# Patient Record
(demographics unavailable — no encounter records)

---

## 2017-10-26 NOTE — RADRPT
EXAM DATE/TIME:  10/26/2017 10:24 

 

[facility] COMPARISON:     

No previous studies available for comparison.

 

                     

INDICATIONS :     

Chest pain and shortness of breath.

                     

 

MEDICAL HISTORY :     

Chronic obstructive pulmonary disease.          

 

SURGICAL HISTORY :     

None.   

 

ENCOUNTER:     

Initial                                        

 

ACUITY:     

1 day      

 

PAIN SCORE:     

10/10

 

LOCATION:     

Bilateral chest 

 

FINDINGS:     

I don't have any priors. Large pleural effusion with patchy consolidation and volume loss seen on the
 left. There is near complete opacification of the left hemithorax. Right lung is reasonably clear. H
eart size probably upper limits of normal.

 

CONCLUSION:     

Combination of extensive consolidation, large effusion and volume loss on the left.

 

 

 

 Wayne Bell MD on October 26, 2017 at 10:55           

Board Certified Radiologist.

 This report was verified electronically.

## 2017-10-26 NOTE — EKG
Date Performed: 10/26/2017       Time Performed: 09:46:27

 

PTAGE:      56 years

 

EKG:      SINUS TACHYCARDIA WITH OCCASIONAL SUPRAVENTRICULAR PREMATURE COMPLEXES MARKED LEFT AXIS DEV
IATION NONSPECIFIC T-WAVE ABNORMALITY ABNORMAL ECG 

 

NO PREVIOUS TRACING            

 

DOCTOR:   Saul Dunbar  Interpretating Date/Time  10/26/2017 23:39:53

## 2017-10-26 NOTE — MH
cc:

KESHAV CONDE

****

 

DATE OF ADMISSION:  10/26/2017

 

 May 16, 1961

 

HISTORY OF PRESENT ILLNESS

The patient is a 56-year-old female with past medical history of COPD, who

presented to [facility] ED with complaints of coughing, wheezing.

The patient states that she had food poisoning last week where she had nausea

and diarrhea, after she ate suspicious food. She denies any worsening of

dyspnea from baseline. In addition, she denies any constitutional symptoms.

She quit smoking five and a half years ago and used to smoke one to two packs

per day for about 30 years.  Chest x-ray in the ER showed consolidation with

large effusion and volume loss on the left.  On further history she denies any

hemoptysis, however, she reports 20 pounds weight loss in the last 4 months and

decreased p.o. intake.  She is being followed up by Dr. Grey her outpatient

pulmonologist.  The patient denies any use of oxygen at home, however, she is

on bronchodilators p.r.n. Her laboratory data is significant for hyponatremia

with sodium level 125, lactic acid level 2.1.  On arrival to the ER she was

tachycardic and tachypneic.  When seen the patient is on 4 liters nasal cannula

with saturation ranges between %.  In the ER she was given Rocephin,

azithromycin, Solu-Medrol, bronchodilator treatment and IV fluids.  Her current

blood pressure is 107/61.

 

PAST MEDICAL HISTORY

Significant for COPD.

 

PAST SURGICAL HISTORY

Unremarkable.

 

ALLERGIES

NO KNOWN DRUG ALLERGIES.

 

FAMILY HISTORY

Father  with lung cancer age 54.

 

SOCIAL HISTORY

Ex-smoker, quit smoking five and half years ago.  Used to smoke one to two

packs per day for 30 years.  She is occasional drinker.

 

MEDICATIONS

Reported medications at home:

Lasix 20 mg daily.

 

REVIEW OF SYSTEMS

As per HPI.  The rest of the review of systems is unremarkable.

 

PHYSICAL EXAMINATION

GENERAL: A 56-year-old female lying in bed in mild distress, appears ill.

VITAL SIGNS:  Temperature of 98.0, pulse 112, blood pressure 107/61, saturation

% on 4 liters oxygen.

HEENT:  Atraumatic, normocephalic. Pupil equal, round and reactive to light and

accommodation. Extraocular muscles intact.  Conjunctivae pink.  Nonicteric

sclerae.  Oral mucosa within normal.  Dry mucous membranes noted.

NECK: Supple.  No JVD, adenopathy or thyromegaly.  Trachea midline.

CARDIOVASCULAR: Tachycardic, normal S1-S2.  No murmurs, rubs or gallops noted.

PULMONARY: Diminished breath sounds at the left base.

ABDOMEN: Soft, nontender, no distension.  Positive bowel sounds.

EXTREMITIES: No cyanosis, clubbing or edema.

NEURO: No focal sensory deficit.

 

LABORATORY DATA

WBC 8.3, hemoglobin 12.4, hematocrit 37, platelet count of 117, sodium 125,

potassium 3.4, chloride 90, CO2 20, BUN 18, creatinine 0.88, glucose 127,

lactic acid 2.1, AST 46, ALT 27, total bilirubin 0.8, troponin 0.11 with total

CK 38, albumin 2.4.

Urinalysis moderate leukocyte esterase, 7 WBC, many bacteria.

 

RADIOGRAPHIC STUDIES

Chest x-ray showed extensive consolidation with large effusion on left.

 

IMPRESSION

1.  Acute respiratory insufficiency.

2.  Left-sided consolidation with pleural effusion.

3.  COPD exacerbation.

4.  Hyponatremia.

5.  Hypokalemia.

6.  Urinary tract infection.

7.  Mild thrombocytopenia.

8.  Elevated AST.

 

RECOMMENDATIONS

1.   Monitor neuro status and avoid any sedatives.

2.   Continue with oxygen and maintain sats above 92%.

3.   Bronchodilators in the form of DuoNeb q. 4 plus q. 2 p.r.n. for shortness

of breath.

4.   The patient is scheduled to undergo left-sided thoracentesis by IR with

possible pigtail catheter placement.  Will send the pleural fluid for analysis,

culture and cytology. Need to rule out malignancy.

5.   Will obtain CT scan of the chest for further evaluation of the pulmonary

parenchyma.

6.   Monitor heart rate and blood pressure closely and maintain MAP above 65

mmHg.  IV fluids in the form of NS at 84 mL an hour.

Lactic acid level measured at 2.1 in the ED.

7.   Monitor renal function I&O's and electrolyte replacement per protocol.

8.   Will check serum and urine osmolality in addition to urine sodium.  Rule

out SIADH secondary to malignancy versus volume depletion.

9.   Continue with broad-spectrum antibiotics in the form of Rocephin and

Zithromax and monitor for signs of infections which include fever and WBC.

Follow up on blood and urine cultures, in addition we will check sputum

culture.  Nasal aspirate screening for influenza is negative.

10.  Place on sliding scale insulin with Accu-Cheks for glycemic control.

11.  Monitor CBC.

12.  No indication for GI prophylaxis.  DVT prophylaxis with SCDs.  Will hold

off on chemical anticoagulation prophylaxis as the patient will undergo

thoracentesis.

 

Further recommendations will be based on hospital course.

 

 

                               __________________________________

                           MD JAZMÍN Khan/JERRY

D:  10/26/2017/12:15 PM

T:  10/26/2017/12:29 PM

Visit #:  [id number]

Job #:  [id number]

## 2017-10-26 NOTE — MB
cc:

NATA CHÁVEZ M.D.

****

 

 

DATE OF CONSULTATION

10/26/17

 

REASON FOR CONSULTATION

Respiratory distress and pleural effusions

 

HISTORY OF PRESENT ILLNESS

This is a 56-year-old white female with a history of COPD and chronic

bronchitis who apparently was suffering from food poisoning  over the past one

week and became quite weak, had nausea, diarrhea, abdominal discomfort and

dehydration.  The patient states that she was unable to pass urine and became

very weak, had lost weight and thus came to the ER and subsequently was

admitted.

 

The patient did have a chest x-ray which showed an area of  effusion with

volume loss in the left side. She then had a chest CT done which showed

evidence of a loculated  mass-like infiltrate in the left lung base  12 x 12

cm. There was also a  left upper lobe lung nodule 7 mm.  She had some  mucous

___ in her left main stem bronchus and volume loss of the left lung. Also

ascending aortic aneurysm was noted.  The patient was started on Solu-Medrol,

IV antibiotics including Rocephin and Zithromax and now in the ICU and seems to

be feeling better.  Denies chest pains or abdominal pains or nausea or

vomiting.

 

PAST MEDICAL HISTORY

1.  History of COPD.

2.  She has had severe anemia for many years.

3.  Fluid retention and chronic leg edema in the past

4.  Chronic pleural effusions which required thoracentesis in the past.

5.  The patient has had no other significant surgical history.  6.  She was

mildly hypertensive in the past.

 

HABITS

The patient smoked one-pack per day for over 30 years and quit five and a half

years ago.  Occasional alcohol use.

 

MEDICATIONS

Lasix 20 mg daily.

 

ALLERGIES

No drug allergies.

 

FAMILY HISTORY

Father had a history of lung cancer.

 

REVIEW OF SYSTEMS

The patient has lost weight, up to 20 pounds.  No headaches, no blackouts.  She

has some chest tightness, wheezing, shortness of breath and orthopnea.  She has

epigastric distress and reflux.  She has urinary frequency and dysuria.  She

has leg swelling and joint pains. She has no skin lesions.  She has no

depression or anxiety.

 

PHYSICAL EXAMINATION

GENERAL:  This is an averagely built middle-aged white female who is pale and

mildly dyspneic.

VITAL SIGNS:  Blood pressure 110/60, pulse is 105, respirations 22, temperature

98.2.

HEENT:  Head normocephalic.  Pupils are reactive and equal.  Tongue moist.

Throat is mildly injected.  Nasal mucosa edematous.

NECK:  Supple.  No bruits, thyroid enlargement or lymphadenopathy.

CHEST:  Distant breath sounds at the lung bases with occasional crackles in the

left upper lung field.

HEART:  The heart sounds are irregular, S1-S2.  No murmur.  No S3.

ABDOMEN:  Soft and nontender.  No organomegaly.  Bowel sounds are active.

EXTREMITIES:  Edema 1+ with diminished peripheral pulses.  NEUROLOGIC:

Reflexes are 1+ with no gross motor deficits.  Cranial nerves grossly intact.

 

RECTAL:   Exam is deferred.

SKIN: Dry and scaly.

 

IMPRESSION

1.  COPD with acute exacerbation

2.  Chronic left basilar effusion with loculation

3.  Atelectasis left lower lobe with mucus plugging.

4.  Hyponatremia.

5.  Abnormal liver enzymes

6.  Anemia and chronic disease.

 

PLAN

The patient has been started on O2 at 2 liters nasal cannula, nebulized DuoNeb

solution q.i.d. and p.r.n. Continue with Rocephin 1 gram IV daily and Zithromax

500 mg IV daily.

 

IMAGING STUDIES

Ultrasound examination of the left lower chest to evaluate the effusion.

Pigtail catheter placement by interventional radiology to drain the effusion.

If there is a mass that is detected on ultrasound, we will need to get a

CT-guided needle biopsy of the mass as well.  The patient may also require a

bronchoscopy at a later date when she is clinically stable. We will get a

pulmonary function study when she is out of the ICU. We will continue with

Solu-Medrol 40 mg IV q.6 h.  Thank you, Dr. Herrmann, for this consultation.

 

 

 

                              _________________________________

                              MD ANA Callejas/

D:  10/26/2017/6:02 PM

T:  10/26/2017/8:06 PM

Visit #:  [id number]

Job #:  [id number]

## 2017-10-26 NOTE — RADRPT
EXAM DATE/TIME:  10/26/2017 13:07 

 

[facility] COMPARISON:     

No previous studies available for comparison.

 

 

INDICATIONS :     

Left effusion, extensive consolidation on radiograph.

                      

 

IV CONTRAST:     

69 cc Omnipaque 350 (iohexol) IV 

 

 

RADIATION DOSE:     

9.59 CTDIvol (mGy) 

 

 

MEDICAL HISTORY :     

Chronic obstructive pulmonary disease.  

 

SURGICAL HISTORY :      

None. 

 

ENCOUNTER:      

Initial

 

ACUITY:      

1 day

 

PAIN SCALE:      

0/10

 

LOCATION:       

Left chest 

 

TECHNIQUE:      

Volumetric scanning of the chest was performed.  Using automated exposure control and adjustment of t
he mA and/or kV according to patient size, radiation dose was kept as low as reasonably achievable to
 obtain optimal diagnostic quality images.   DICOM format image data is available electronically for 
review and comparison.  

 

Follow-up recommendations for detected pulmonary nodules are based at a minimum on nodule size and pa
tient risk factors according to Fleischner Society Guidelines.

 

FINDINGS:     

There is either loculated complex fluid or a soft tissue mass involving the left inferior hemithorax.
 This measures 12.6 x 12.2 x 7.4 cm and Hounsfield units are 28. Mixed areas of either calcification 
or linear enhancement seen involving this structure. It does generate mass effect compressing the lef
t lower lobe. Low density material is seen filling the left mainstem bronchus which could either rela
te superior material or mucus. There is resulting volume loss involving the left hemithorax. The left
 upper lobe remains somewhat aerated although not fully. A 7 mm nodule is seen within the left upper 
lobe. The right lung is fully expanded and clear. Heart is normal in size. Coronary artery and aortic
 atherosclerotic calcifications are seen. The ascending aorta is dilated measuring 4.4 cm in diameter
. No appreciable adenopathy. Axillary structures are unremarkable. Images of the upper abdomen are un
remarkable.

 

CONCLUSION:     

1. Either highly complex fluid or soft tissue mass involving the left lung base with mass effect. Thi
s measures 12.2 x 12.6 x 7.4 cm. An ultrasound canal differentiate.

2. 7 mm nodule on the left upper lobe.

3. Material filling the left mainstem bronchus either related to purulent material or mucus. There is
 resulting volume loss of the entire left lung.

4. 4.4 cm ascending aortic aneurysm.

 

 

 

 Efrain Gomez Jr., MD on October 26, 2017 at 13:42           

Board Certified Radiologist.

 This report was verified electronically.

## 2017-10-26 NOTE — PD
HPI


Chief Complaint:  Respiratory Symptoms


Time Seen by Provider:  10:03


Travel History


International Travel<30 days:  No


Contact w/Intl Traveler<30days:  No


Traveled to known affect area:  No





History of Present Illness


HPI


This is a 56-year-old female with a history of COPD, presents today with 

complaints of shortness of breath, wheezing, cough.  The patient also reports 

that over the last week, she had had food poisoning.  She states she ate 

suspicious food and shortly thereafter started having the nausea vomiting 

diarrhea.  Patient also complains of difficulty urinating.  She reports that she

's not able to empty her bladder.  There is no reported dysuria.  She states 

she has to push hard to empty her bladder.





PFSH


Past Medical History


COPD:  Yes


Diminished Hearing:  No


Menopausal:  Yes





Past Surgical History


Surgical History:  No Previous Surgery





Social History


Alcohol Use:  Yes (OCC)


Tobacco Use:  No


Substance Use:  No





Allergies-Medications


(Allergen,Severity, Reaction):  


Coded Allergies:  


     No Known Allergies (Verified , 10/26/17)


Reported Meds & Prescriptions





Reported Meds & Active Scripts


Active


Reported


Furosemide 20 Mg Tab 20 Mg PO DAILY








Review of Systems


Except as stated in HPI:  all other systems reviewed are Neg


General / Constitutional:  No: Fever, Chills


HENT:  No: Headaches, Neck Pain


Cardiovascular:  Positive: Tachycardia, No: Chest Pain or Discomfort, 

Palpitations


Respiratory:  Positive: Cough, Shortness of Breath, Wheezing


Gastrointestinal:  No: Nausea, Vomiting (earlier), Abdominal Pain (early)


Genitourinary:  Positive: Other (urinary retention), No: Frequency, Dysuria


Musculoskeletal:  Positive: Weakness (generalized), No: Pain


Neurologic:  Positive: Weakness (generalized), Other (urinary retention), No: 

Headache





Physical Exam


Narrative


GENERAL: Well-nourished, well-developed patient in moderate to severe 

respiratory distress.


SKIN: Focused skin assessment warm/dry.


HEAD: Normocephalic/atraumatic.


EYES: No scleral icterus. No injection or drainage. 


NECK: Supple, trachea midline. 


CARDIOVASCULAR: Tachycardic without obvious murmurs.


RESPIRATORY: Diffuse expiratory wheezes bilaterally.  Positive accessory muscle 

use.


GASTROINTESTINAL: Abdomen soft, non-tender, nondistended.  Patient has 

subjective bladder distention.


MUSCULOSKELETAL: No cyanosis, or edema. 


NEUROLOGICAL: Awake and alert. Cranial nerves II through XII intact.  Motor 

grossly within normal limits. Five out of 5 muscle strength in all muscle 

groups.  Normal speech.





Data


Data


Last Documented VS





Vital Signs








  Date Time  Temp Pulse Resp B/P (MAP) Pulse Ox O2 Delivery O2 Flow Rate FiO2


 


10/26/17 12:06  114 27 107/61 (76) 100 Nasal Cannula 4.00 


 


10/26/17 09:39 98.0       








Orders





 Orders


Complete Blood Count With Diff (10/26/17 10:03)


Comprehensive Metabolic Panel (10/26/17 10:03)


Ckmb (Isoenzyme) Profile (10/26/17 10:03)


Troponin I (10/26/17 10:03)


Urinalysis - C+S If Indicated (10/26/17 10:03)


Influenzae A/B Antigen (10/26/17 10:03)


Blood Culture (10/26/17 10:03)


Iv Access Insert/Monitor (10/26/17 10:03)


Ecg Monitoring (10/26/17 10:03)


Oximetry (10/26/17 10:03)


Oxygen Administration (10/26/17 10:03)


Chest, Single Ap (10/26/17 10:03)


Sodium Chloride 0.9% Flush (Ns Flush) (10/26/17 10:15)


Methylprednisolone So Succ Inj (Solumedr (10/26/17 10:15)


Albuterol-Ipratropium Neb (Duoneb Neb) (10/26/17 10:15)


Albuterol Neb (Albuterol Neb) (10/26/17 10:15)


Sodium Chlor 0.9% 1000 Ml Inj (Ns 1000 M (10/26/17 10:15)


Urinary Catheter Insert/Apply (10/26/17 10:36)


Ondansetron Inj (Zofran Inj) (10/26/17 10:45)


Urine Culture (10/26/17 10:05)


Lactic Acid Sepsis Protocol (10/26/17 11:12)


Ceftriaxone Inj (Rocephin Inj) (10/26/17 11:15)


Azithromycin Inj (Zithromax Inj) (10/26/17 11:15)


Admit To Inpatient (10/26/17 )


Code Status (10/26/17 11:23)


Vital Signs (Adult) WILMA.Q1H (10/26/17 11:23)


Activity Bed Rest (10/26/17 11:23)


Elevate Head Of Bed (10/26/17 11:23)


Neuro Checks .AS ORDERED (10/26/17 11:23)


Pantoprazole Inj (Protonix Inj) (10/26/17 12:00)


Albuterol-Ipratropium Neb (Duoneb Neb) (10/26/17 12:00)


Albuterol-Ipratropium Neb (Duoneb Neb) (10/26/17 11:30)


Complete Blood Count With Diff (10/27/17 04:00)


Comprehensive Metabolic Panel (10/27/17 04:00)


Magnesium (Mg) (10/27/17 04:00)


Phosphorus (Po4) (10/27/17 04:00)


Cardiac Monitor / Telemetry WILMA.Q8H (10/26/17 11:23)


Scd Bilateral/Knee High WILMA.BID (10/26/17 11:23)


^ Initiate Protocol (10/26/17 11:23)


Instruction (10/26/17 11:23)


Misc Nursing Information (10/26/17 11:30)


Chlorhexidine 2% Cloth (Chlorhexidine 2% (10/27/17 04:00)


Chlorhexidine 2% Cloth (Chlorhexidine 2% (10/26/17 11:30)


Mrsa Pcr Surveillance (10/26/17 11:23)


Docusate Sodium-Senna (Jess-Colace) (10/26/17 21:00)


Magnesium Hydroxide Liq (Milk Of Magnesi (10/26/17 11:30)


Sennosides (Senokot) (10/26/17 11:30)


Bisacodyl Supp (Dulcolax Supp) (10/26/17 11:30)


Lactulose Liq (Lactulose Liq) (10/26/17 11:30)


Inpatient Certification (10/26/17 )


Sodium Chlor 0.9% 1000 Ml Inj (Ns 1000 M (10/26/17 11:30)


Osmolality, Urine (10/26/17 11:26)


Osmolality,Serum (10/26/17 11:26)


Sodium, Random Urine (10/26/17 11:26)


^ Medication Admin Instruction (10/26/17 11:26)


Notify Dr: Other (10/26/17 11:26)


Phosphorus (Po4) (10/26/17 11:26)


Potassium Chlor 40 Meq Premix (Kcl 40 Me (10/26/17 11:30)


Potassium Chlor 20 Meq Premix (Kcl 20 Me (10/26/17 11:30)


Potassium Chloride Eff (K-Lyte Cl  Eff) (10/26/17 11:30)


Potassium Chlor 40 Meq Premix (Kcl 40 Me (10/26/17 11:30)


Potassium Chlor 20 Meq Premix (Kcl 20 Me (10/26/17 11:30)


Magnesium Sulfate Inj (Magnesium Sulfate (10/26/17 11:30)


Magnesium Oxide (Mag-Ox) (10/26/17 11:30)


Magnesium Sulfate Inj (Magnesium Sulfate (10/26/17 11:30)


Potassium Phosphate (K-Phos) (10/26/17 11:30)


Sodium Phosphate Inj (Sodium Phosphate I (10/26/17 11:30)


Potassium Phosphate (K-Phos) (10/26/17 11:30)


Potassium Phosphate Inj (Potassium Phosp (10/26/17 11:30)


Blood Glucose Goal (Criteria) (10/26/17 11:26)


Hypoglycemia 70 Mg/Dl Or < (10/26/17 11:26)


Notify Dr: Other (10/26/17 11:26)


Dextrose 50% In Melissa (Vial) Inj (D50w (Vi (10/26/17 11:30)


Glucagon Inj (Glucagon Inj) (10/26/17 11:30)


Insulin Human Reg Supp Scale (Novolin R (10/26/17 11:30)


Sputum Culture And Gram Stain (10/26/17 11:26)


B-Type Natriuretic Peptide (10/26/17 11:28)


Methylprednisolone So Succ Inj (Solumedr (10/26/17 16:00)


Prothrombin Time / Inr (Pt) (10/26/17 11:29)


Act Partial Throm Time (Ptt) (10/26/17 11:29)


Electrocardiogram (10/26/17 09:46)


Ceftriaxone Inj (Rocephin Inj) (10/27/17 12:00)


Azithromycin Inj (Zithromax Inj) (10/27/17 13:00)


Us Guided Thoracentesis (10/26/17 )


Glucose, Pleural Fluid (10/26/17 12:11)


Ldh, Pleural Fluid (10/26/17 12:11)


Pleural Fluid Ph (10/26/17 12:11)


Pleural Fl Cell Count + Diff (10/26/17 12:11)


Fluid Culture And Gram Stain (10/26/17 12:11)


Cytology Request For Service (10/26/17 12:11)


Total Protein, Pleural Fluid (10/26/17 12:11)


Consult Pulmonology (10/26/17 )


Admit Order (Ed Use Only) (10/26/17 12:23)


Ct Thoracentesis W Insertion (10/26/17 12:24)





Labs





Laboratory Tests








Test


  10/26/17


10:05 10/26/17


10:10 10/26/17


11:35 10/26/17


12:00


 


Urine Color YELLOW    


 


Urine Turbidity HAZY    


 


Urine pH 7.5    


 


Urine Specific Gravity 1.008    


 


Urine Protein TRACE mg/dL    


 


Urine Glucose (UA) NEG mg/dL    


 


Urine Ketones 10 mg/dL    


 


Urine Occult Blood NEG    


 


Urine Nitrite NEG    


 


Urine Bilirubin NEG    


 


Urine Urobilinogen


  LESS THAN 2.0


MG/DL 


  


  


 


 


Urine Leukocyte Esterase MOD    


 


Urine RBC 1 /hpf    


 


Urine WBC 7 /hpf    


 


Urine Bacteria MANY /hpf    


 


Microscopic Urinalysis Comment


  CULTURE


INDICATED 


  


  


 


 


White Blood Count  8.3 TH/MM3   


 


Red Blood Count  3.29 MIL/MM3   


 


Hemoglobin  12.4 GM/DL   


 


Hematocrit  37.1 %   


 


Mean Corpuscular Volume  112.7 FL   


 


Mean Corpuscular Hemoglobin  37.7 PG   


 


Mean Corpuscular Hemoglobin


Concent 


  33.5 % 


  


  


 


 


Red Cell Distribution Width  14.9 %   


 


Platelet Count  117 TH/MM3   


 


Mean Platelet Volume  10.0 FL   


 


Neutrophils (%) (Auto)  75.3 %   


 


Lymphocytes (%) (Auto)  10.6 %   


 


Monocytes (%) (Auto)  14.0 %   


 


Eosinophils (%) (Auto)  0.0 %   


 


Basophils (%) (Auto)  0.1 %   


 


Neutrophils # (Auto)  6.3 TH/MM3   


 


Lymphocytes # (Auto)  0.9 TH/MM3   


 


Monocytes # (Auto)  1.2 TH/MM3   


 


Eosinophils # (Auto)  0.0 TH/MM3   


 


Basophils # (Auto)  0.0 TH/MM3   


 


CBC Comment  DIFF FINAL   


 


Differential Comment     


 


Blood Urea Nitrogen  18 MG/DL   


 


Creatinine  0.88 MG/DL   


 


Random Glucose  127 MG/DL   


 


Total Protein  7.7 GM/DL   


 


Albumin  2.4 GM/DL   


 


Calcium Level  8.4 MG/DL   


 


Alkaline Phosphatase  95 U/L   


 


Aspartate Amino Transf


(AST/SGOT) 


  46 U/L 


  


  


 


 


Alanine Aminotransferase


(ALT/SGPT) 


  27 U/L 


  


  


 


 


Total Bilirubin  0.8 MG/DL   


 


Sodium Level  125 MEQ/L   


 


Potassium Level  3.4 MEQ/L   


 


Chloride Level  90 MEQ/L   


 


Carbon Dioxide Level  20.3 MEQ/L   


 


Anion Gap  15 MEQ/L   


 


Estimat Glomerular Filtration


Rate 


  66 ML/MIN 


  


  


 


 


Total Creatine Kinase  38 U/L   


 


Troponin I  0.11 NG/ML   


 


Lactic Acid Level   2.1 mmol/L  


 


Prothrombin Time    11.8 SEC 


 


Prothromb Time International


Ratio 


  


  


  1.1 RATIO 


 


 


Activated Partial


Thromboplast Time 


  


  


  34.4 SEC 


 











MDM


Medical Decision Making


Medical Screen Exam Complete:  Yes


Emergency Medical Condition:  Yes


Interpretation(s)





Laboratory Tests








Test


  10/26/17


10:05 10/26/17


10:10 10/26/17


11:35 10/26/17


12:00


 


Urine Color YELLOW    


 


Urine Turbidity HAZY    


 


Urine pH 7.5    


 


Urine Specific Gravity 1.008    


 


Urine Protein TRACE mg/dL    


 


Urine Glucose (UA) NEG mg/dL    


 


Urine Ketones 10 mg/dL    


 


Urine Occult Blood NEG    


 


Urine Nitrite NEG    


 


Urine Bilirubin NEG    


 


Urine Urobilinogen


  LESS THAN 2.0


MG/DL 


  


  


 


 


Urine Leukocyte Esterase MOD    


 


Urine RBC 1 /hpf    


 


Urine WBC 7 /hpf    


 


Urine Bacteria MANY /hpf    


 


Microscopic Urinalysis Comment


  CULTURE


INDICATED 


  


  


 


 


White Blood Count  8.3 TH/MM3   


 


Red Blood Count  3.29 MIL/MM3   


 


Hemoglobin  12.4 GM/DL   


 


Hematocrit  37.1 %   


 


Mean Corpuscular Volume  112.7 FL   


 


Mean Corpuscular Hemoglobin  37.7 PG   


 


Mean Corpuscular Hemoglobin


Concent 


  33.5 % 


  


  


 


 


Red Cell Distribution Width  14.9 %   


 


Platelet Count  117 TH/MM3   


 


Mean Platelet Volume  10.0 FL   


 


Neutrophils (%) (Auto)  75.3 %   


 


Lymphocytes (%) (Auto)  10.6 %   


 


Monocytes (%) (Auto)  14.0 %   


 


Eosinophils (%) (Auto)  0.0 %   


 


Basophils (%) (Auto)  0.1 %   


 


Neutrophils # (Auto)  6.3 TH/MM3   


 


Lymphocytes # (Auto)  0.9 TH/MM3   


 


Monocytes # (Auto)  1.2 TH/MM3   


 


Eosinophils # (Auto)  0.0 TH/MM3   


 


Basophils # (Auto)  0.0 TH/MM3   


 


CBC Comment  DIFF FINAL   


 


Differential Comment     


 


Blood Urea Nitrogen  18 MG/DL   


 


Creatinine  0.88 MG/DL   


 


Random Glucose  127 MG/DL   


 


Total Protein  7.7 GM/DL   


 


Albumin  2.4 GM/DL   


 


Calcium Level  8.4 MG/DL   


 


Alkaline Phosphatase  95 U/L   


 


Aspartate Amino Transf


(AST/SGOT) 


  46 U/L 


  


  


 


 


Alanine Aminotransferase


(ALT/SGPT) 


  27 U/L 


  


  


 


 


Total Bilirubin  0.8 MG/DL   


 


Sodium Level  125 MEQ/L   


 


Potassium Level  3.4 MEQ/L   


 


Chloride Level  90 MEQ/L   


 


Carbon Dioxide Level  20.3 MEQ/L   


 


Anion Gap  15 MEQ/L   


 


Estimat Glomerular Filtration


Rate 


  66 ML/MIN 


  


  


 


 


Total Creatine Kinase  38 U/L   


 


Troponin I  0.11 NG/ML   


 


Lactic Acid Level   2.1 mmol/L  


 


Prothrombin Time    11.8 SEC 


 


Prothromb Time International


Ratio 


  


  


  1.1 RATIO 


 


 


Activated Partial


Thromboplast Time 


  


  


  34.4 SEC 


 








Differential Diagnosis


COPD versus pneumonia versus dehydration versus anemia


Narrative Course


56-year-old female history of COPD, presents today with points of shortness of 

breath and weakness per patient also has urinary retention.  Patient's 

urinalysis showed evidence of a UTI.  Patient also had 1300 cc of urine in her 

bladder that she could not void.  Chest x-ray shows a large left sided pleural 

effusion.  Patient also has hyponatremia and an elevated troponin.  Case was 

discussed with Dr. Herrmann, intensivist, who agreed to admit the patient his 

service.  The patient will get a therapeutic and diagnostic thoracentesis with 

chest tube placement.  He'll be a CT ordered of her chest after the 

thoracentesis.  Concern here is patient may have paraneoplastic syndrome and 

underlying lung cancer.


Critical Care Narrative


Aggregate critical care time was 45 minutes. Time to perform other separately 

billable procedures was not included in the critical care time. My time did not 

include minutes spent treating any other patients simultaneously or on 

activities that did not directly contribute to the patient's treatment.  





The services I provided to this patient were to treat and/or prevent clinically 

significant deterioration that could result in: Death





I provided critical care services requiring my management, as noted below:


Chart data review, documentation time, medication orders and management, vital 

sign assessments/reviewing monitor data, ordering and reviewing lab tests, 

ordering and interpreting/reviewing x-rays and diagnostic studies, care of the 

patient and discussion of the patient with the admitting physicians.





Diagnosis





 Primary Impression:  


 Respiratory distress


 Additional Impressions:  


 large left pleural effusion


 Hyponatremia


 Elevated troponin


 persistent tachycardia


 Hypochloremia





Admitting Information


Admitting Physician Requests:  Admit











Vikash Burciaga MD Oct 26, 2017 10:54

## 2017-10-27 NOTE — HHI.CCPN
Subjective


Remarks/Hospital Course


The patient is a 56-year-old female with past medical history of COPD, who 

presented to [facility] ED with complaints of coughing, wheezing. 

The patient states that she had food poisoning last week where she had nausea 

and diarrhea, after she ate suspicious food. She denies any worsening of 

dyspnea from baseline. In addition, she denies any constitutional symptoms. She 

quit smoking five and a half years ago and used to smoke one to two packs per 

day for about 30 years.  Chest x-ray in the ER showed consolidation with large 

effusion and volume loss on the left.  On further history she denies any 

hemoptysis, however, she reports 20 pounds weight loss in the last 4 months and 

decreased p.o. intake.  She is being followed up by Dr. Grey her outpatient 

pulmonologist.  The patient denies any use of oxygen at home, however, she is 

on bronchodilators p.r.n. Her laboratory data is significant for hyponatremia 

with sodium level 125, lactic acid level 2.1.  On arrival to the ER she was 

tachycardic and tachypneic.  When seen the patient is on 4 liters nasal cannula 

with saturation ranges between %.  In the ER she was given Rocephin, 

azithromycin, Solu-Medrol, bronchodilator treatment and IV fluids.  Her current 

blood pressure is 107/61.





10/27 Patient s/p CT guided thoracentesis and CT placement by IR yesterday. She 

is feeling better.





Objective





Vital Signs








  Date Time  Temp Pulse Resp B/P (MAP) Pulse Ox O2 Delivery O2 Flow Rate FiO2


 


10/27/17 07:29     100   21


 


10/27/17 06:00  99      


 


10/27/17 04:00 98.7  24 128/78 (95)    


 


10/26/17 19:46      Nasal Cannula 2.00 














Intake and Output   


 


 10/27/17 10/27/17 10/28/17





 08:00 16:00 00:00


 


Output Total 350 ml  


 


Balance -350 ml  








Result Diagram:  


10/27/17 0330                                                                  

              10/27/17 0330





Other Results





Laboratory Tests








Test


  10/26/17


10:05 10/26/17


10:10 10/26/17


11:35 10/26/17


12:00


 


Urine Color YELLOW    


 


Urine Turbidity HAZY    


 


Urine pH 7.5    


 


Urine Specific Gravity 1.008    


 


Urine Protein TRACE mg/dL    


 


Urine Glucose (UA) NEG mg/dL    


 


Urine Ketones 10 mg/dL    


 


Urine Occult Blood NEG    


 


Urine Nitrite NEG    


 


Urine Bilirubin NEG    


 


Urine Urobilinogen


  LESS THAN 2.0


MG/DL 


  


  


 


 


Urine Leukocyte Esterase MOD    


 


Urine RBC 1 /hpf    


 


Urine WBC 7 /hpf    


 


Urine Bacteria MANY /hpf    


 


Microscopic Urinalysis Comment


  CULTURE


INDICATED 


  


  


 


 


Urine Osmolality 289 MOSM/KG    


 


Urine Random Sodium 48 MEQ/L    


 


White Blood Count  8.3 TH/MM3   


 


Red Blood Count  3.29 MIL/MM3   


 


Hemoglobin  12.4 GM/DL   


 


Hematocrit  37.1 %   


 


Mean Corpuscular Volume  112.7 FL   


 


Mean Corpuscular Hemoglobin  37.7 PG   


 


Mean Corpuscular Hemoglobin


Concent 


  33.5 % 


  


  


 


 


Red Cell Distribution Width  14.9 %   


 


Platelet Count  117 TH/MM3   


 


Mean Platelet Volume  10.0 FL   


 


Neutrophils (%) (Auto)  75.3 %   


 


Lymphocytes (%) (Auto)  10.6 %   


 


Monocytes (%) (Auto)  14.0 %   


 


Eosinophils (%) (Auto)  0.0 %   


 


Basophils (%) (Auto)  0.1 %   


 


Neutrophils # (Auto)  6.3 TH/MM3   


 


Lymphocytes # (Auto)  0.9 TH/MM3   


 


Monocytes # (Auto)  1.2 TH/MM3   


 


Eosinophils # (Auto)  0.0 TH/MM3   


 


Basophils # (Auto)  0.0 TH/MM3   


 


CBC Comment  DIFF FINAL   


 


Differential Comment     


 


Blood Urea Nitrogen  18 MG/DL   


 


Creatinine  0.88 MG/DL   


 


Random Glucose  127 MG/DL   


 


Total Protein  7.7 GM/DL   


 


Albumin  2.4 GM/DL   


 


Calcium Level  8.4 MG/DL   


 


Alkaline Phosphatase  95 U/L   


 


Aspartate Amino Transf


(AST/SGOT) 


  46 U/L 


  


  


 


 


Alanine Aminotransferase


(ALT/SGPT) 


  27 U/L 


  


  


 


 


Total Bilirubin  0.8 MG/DL   


 


Sodium Level  125 MEQ/L   


 


Potassium Level  3.4 MEQ/L   


 


Chloride Level  90 MEQ/L   


 


Carbon Dioxide Level  20.3 MEQ/L   


 


Anion Gap  15 MEQ/L   


 


Estimat Glomerular Filtration


Rate 


  66 ML/MIN 


  


  


 


 


Serum Osmolality  276 MOSM/KG   


 


Phosphorus Level  2.5 MG/DL   


 


Total Creatine Kinase  38 U/L   


 


Troponin I  0.11 NG/ML   


 


B-Type Natriuretic Peptide  634 PG/ML   


 


Lactic Acid Level   2.1 mmol/L  


 


Prothrombin Time    11.8 SEC 


 


Prothromb Time International


Ratio 


  


  


  1.1 RATIO 


 


 


Activated Partial


Thromboplast Time 


  


  


  34.4 SEC 


 


 


Test


  10/26/17


14:00 10/26/17


15:05 10/26/17


16:00 10/27/17


03:30


 


Pleural Fluid pH     


 


Pleural Fluid WBC  /MM3    


 


Pleural Fluid RBC  /MM3    


 


Pleural Fluid Neutrophils  %    


 


Pleural Fluid Lymphocytes  %    


 


Pleural Fluid Monocytes  %    


 


Lactic Acid Level  1.6 mmol/L   


 


Nasal Screen MRSA (PCR)


  


  


  MRSA NOT


DETECTED 


 


 


White Blood Count    4.2 TH/MM3 


 


Red Blood Count    2.24 MIL/MM3 


 


Hemoglobin    8.4 GM/DL 


 


Hematocrit    24.9 % 


 


Mean Corpuscular Volume    111.1 FL 


 


Mean Corpuscular Hemoglobin    37.5 PG 


 


Mean Corpuscular Hemoglobin


Concent 


  


  


  33.8 % 


 


 


Red Cell Distribution Width    14.7 % 


 


Platelet Count    89 TH/MM3 


 


Mean Platelet Volume    10.5 FL 


 


Neutrophils (%) (Auto)    86.3 % 


 


Lymphocytes (%) (Auto)    8.8 % 


 


Monocytes (%) (Auto)    4.8 % 


 


Eosinophils (%) (Auto)    0.1 % 


 


Basophils (%) (Auto)    0.0 % 


 


Neutrophils # (Auto)    3.6 TH/MM3 


 


Lymphocytes # (Auto)    0.4 TH/MM3 


 


Monocytes # (Auto)    0.2 TH/MM3 


 


Eosinophils # (Auto)    0.0 TH/MM3 


 


Basophils # (Auto)    0.0 TH/MM3 


 


CBC Comment    AUTO DIFF 


 


Differential Comment


  


  


  


  AUTO DIFF


CONFIRMED


 


Platelet Estimate    LOW 


 


Platelet Morphology Comment    ENLARGED 


 


Blood Urea Nitrogen    14 MG/DL 


 


Creatinine    0.38 MG/DL 


 


Random Glucose    130 MG/DL 


 


Total Protein    5.6 GM/DL 


 


Albumin    1.7 GM/DL 


 


Calcium Level    7.8 MG/DL 


 


Phosphorus Level    1.8 MG/DL 


 


Magnesium Level    2.0 MG/DL 


 


Alkaline Phosphatase    55 U/L 


 


Aspartate Amino Transf


(AST/SGOT) 


  


  


  27 U/L 


 


 


Alanine Aminotransferase


(ALT/SGPT) 


  


  


  16 U/L 


 


 


Total Bilirubin    0.3 MG/DL 


 


Sodium Level    134 MEQ/L 


 


Potassium Level    3.3 MEQ/L 


 


Chloride Level    102 MEQ/L 


 


Carbon Dioxide Level    22.7 MEQ/L 


 


Anion Gap    9 MEQ/L 


 


Estimat Glomerular Filtration


Rate 


  


  


  175 ML/MIN 


 








Imaging





Last Impressions








Chest Tube Insertion 10/26/17 1224 Signed





Impressions: 





 Service Date/Time:  Thursday, October 26, 2017 13:22 - CONCLUSION: 

Uncomplicated 





 CT-guided thoracentesis.     Jorge Carrillo MD 


 


Chest X-Ray 10/26/17 1003 Signed





Impressions: 





 Service Date/Time:  Thursday, October 26, 2017 10:24 - CONCLUSION:  

Combination 





 of extensive consolidation, large effusion and volume loss on the left.     

Wayne Bell MD 


 


Chest CT 10/26/17 0000 Signed





Impressions: 





 Service Date/Time:  Thursday, October 26, 2017 13:07 - CONCLUSION:  1. Either 





 highly complex fluid or soft tissue mass involving the left lung base with 

mass 





 effect. This measures 12.2 x 12.6 x 7.4 cm. An ultrasound canal differentiate. 





 2. 7 mm nodule on the left upper lobe. 3. Material filling the left mainstem 





 bronchus either related to purulent material or mucus. There is resulting 

volume 





 loss of the entire left lung. 4. 4.4 cm ascending aortic aneurysm.     Efrain Gomez Jr., MD 








Objective Remarks


GENERAL: Patient is 55 yo lying in bed in NAD


SKIN: Warm and dry.


HEAD: Normocephalic.


EYES: No scleral icterus. No injection or drainage. 


NECK: Supple, trachea midline. No JVD or lymphadenopathy.


CARDIOVASCULAR: Regular rate and rhythm without murmurs, gallops, or rubs. 


RESPIRATORY: Breath sounds equal bilaterally. No accessory muscle use.


GASTROINTESTINAL: Abdomen soft, non-tender, nondistended. 


MUSCULOSKELETAL: No cyanosis, or edema. 


Neuro: Awake and alert








A/P


Assessment and Plan


1.  Acute respiratory insufficiency.


2.  Left-sided consolidation with pleural effusion.


3.  COPD exacerbation.


4.  Hyponatremia/hypophos


5.  Hypokalemia.


6.  Urinary tract infection.


7.  Mild thrombocytopenia.


8.  Elevated AST.





Plan


Neuro: Awake and alert





Pulm: Oxygen PRN maintain sats above 92%.


         Bronchodilators, solumederol 60mg Q6. Check CXR


        s/p CT guided left-sided thoracentesis and CT placement by IR 

yesterday.  


        Send the pleural fluid for analysis, culture and cytology. N


        CT chest: Either highly complex fluid or soft tissue mass involving the 

left lung base with mass effect. This measures 12.2 x 12.6 x 7.4 cm. 


        4.4 cm ascending aortic aneurysm.


        Will check US chest to differentiate between solid vs cystic mass in 

chest if solid then patient will need CT guided needle biopsy to r/o malignancy.


        Pulm is following-Dr. Grey. 





CV:  Monitor HR and BP and maintain MAP>65mmHg


       Lactic acid level measured at 2.1 in the ED.


      Check Echo to eval LV function





GI: On PO diet





: Monitor renal function I&O's and electrolyte replacement per protocol.


      Will need K, Phos replacement today





ID: Continue with abx( Rocephin and Zithromax) and monitor for signs of 

infections(fever and WBC).


   Follow up on blood and urine cultures,


   Nasal aspirate screening for influenza is negative.





Endo: SSI with Accu-Cheks for glycemic control.





Heme: Monitor CBC.





DVT prophylaxis with SCDs.





Level 3











Richa Herrmann MD Oct 27, 2017 08:23

## 2017-10-27 NOTE — ECHRPT
Indication:   EVAL LV FUNCTION

 

 CONCLUSIONS

 Normal left ventricular size. 

 Wall thickness is normal. 

 The left ventricular systolic function is grossly normal on limited imaging. 

 The left atrial size is moderately dilated. 

 Mild mitral valve stenosis. 

 Mild mitral annular calcification. 

 Mild mitral valve regurgitation. 

 Moderate calcifix aortic valve stenosis. 

 Mild-to-moderate aortic valve regurgitation. 

 There is trace tricuspid valve regurgitation. 

 The estimated pulmonary arterial pressure is 69.3 mmHg. 

 There is estimated moderate-to-severe pulmonary hypertension present (range 60-70 mmHg). 

 

 BP:  128   / 78      HR:                          Rhythm:           Sinus

 

 MEASUREMENTS  (Male / Female) Normal Values       Technical Quality:Fair

 2D ECHO

 LV Diastolic Diameter PLAX        3.7 cm                4.2 - 5.9 / 3.9 - 5.3 cm

 LV Systolic Diameter PLAX         2.4 cm                

 IVS Diastolic Thickness           1.0 cm                0.6 - 1.0 / 0.6 - 0.9 cm

 LVPW Diastolic Thickness          1.0 cm                0.6 - 1.0 / 0.6 - 0.9 cm

 LV Relative Wall Thickness        0.5                   

 LVOT Diameter                     1.9 cm                

 Aortic Root Diameter              3.0 cm                

 LA Systolic Diameter LX           3.8 cm                3.0 - 4.0 / 2.7 - 3.8 cm

 

 DOPPLER

 AV Peak Velocity                  459.0 cm/s            

 AV Peak Gradient                  84.3 mmHg             

 AV Mean Gradient                  49.0 mmHg             

 AV Velocity Time Integral         90.5 cm               

 LVOT Peak Velocity                116.7 cm/s            

 LVOT Peak Gradient                5.4 mmHg              

 LVOT Velocity Time Integral       23.0 cm               

 AV Area Cont Eq vti               0.7 cm               

 AV Area Cont Eq pk                0.7 cm               

 MV Peak Velocity                  237.6 cm/s            

 MV Peak Gradient                  22.6 mmHg             

 MV Mean Velocity                  131.2 cm/s            

 MV Mean Gradient                  8.4 mmHg              

 Mitral E Point Velocity           78.2 cm/s             

 Mitral A Point Velocity           96.7 cm/s             

 Mitral E to A Ratio               0.8                   

 TR Peak Velocity                  385.0 cm/s            

 TR Peak Gradient                  59.3 mmHg             

 Right Atrial Pressure             10.0 mmHg             

 Pulmonary Artery Systolic Pressu  69.3 mmHg             

 Right Ventricular Systolic Press  69.3 mmHg             

 PV Peak Velocity                  79.9 cm/s             

 PV Peak Gradient                  2.6 mmHg              

 

 

 FINDINGS

 

 LEFT VENTRICLE

 Normal left ventricular size. 

 Wall thickness is normal. 

 The left ventricular systolic function is grossly normal on limited imaging. 

 

 RIGHT VENTRICLE

 Normal right ventricular size and systolic function. 

 

 LEFT ATRIUM

 The left atrial size is moderately dilated. 

 

 

 RIGHT ATRIUM

 The right atrial size is normal. 

 

 AORTA

 The aortic root and proximal ascending aorta are normal in size on limited imaging. 

 

 MITRAL VALVE

 Mild mitral valve stenosis. 

 Mild mitral annular calcification. 

 Mild mitral valve regurgitation. 

 

 AORTIC VALVE

 Moderate calcifix aortic valve stenosis. 

 Mild-to-moderate aortic valve regurgitation. 

 

 TRICUSPID VALVE

 There is trace tricuspid valve regurgitation. 

 The estimated pulmonary arterial pressure is 69.3 mmHg. 

 There is estimated moderate-to-severe pulmonary hypertension present (range 60-70 mmHg). 

 

 

 

 

 

  Rashid Davis MD

  (Electronically Signed)

  Final Date:27 October 2017 18:30

## 2017-10-27 NOTE — RADRPT
EXAM DATE/TIME:  10/27/2017 09:59 

 

[facility] COMPARISON:     

CT THORAX W CONTRAST, October 26, 2017, 13:07.

        

 

 

INDICATIONS :     

Possible mass vs. complex fluid seen on CT.

                     

 

MEDICAL HISTORY :     

Chronic obstructive pulmonary disease.     Blood transfusion.

 

SURGICAL HISTORY :          

Left chest tube.

 

ENCOUNTER:     

Initial

 

ACUITY:     

1 day

 

PAIN SCORE:     

7/10

 

LOCATION:     

Left chest 

                              

FINDINGS:     

Real-time ultrasound examination of the left chest was performed.  A tube is seen in the left chest a
nd there is intermediate echotexture material surrounding the tube measuring up to 7 cm in thickness.
  There is no anechoic fluid seen.

 

CONCLUSION:     

No focal collections of anechoic free fluid.  Prominent amount of heterogeneous echotexture material 
surrounding the left lower chest.

 

 

 

 Efrain Samuels MD on October 27, 2017 at 11:14           

Board Certified Radiologist.

 This report was verified electronically.

## 2017-10-27 NOTE — HHI.PR
Subjective


Remarks


Pain  along  left  chest wall. No SOB .


Chest tube  drained little





Objective





Vital Signs








  Date Time  Temp Pulse Resp B/P (MAP) Pulse Ox O2 Delivery O2 Flow Rate FiO2


 


10/27/17 08:00 98.5 103 30 116/63 (80) 100   


 


10/27/17 07:29     100   21


 


10/27/17 06:00  99      


 


10/27/17 04:00 98.7 96 24 128/78 (95) 100   


 


10/27/17 04:00  96      


 


10/27/17 02:00  124      


 


10/27/17 00:00  99      


 


10/27/17 00:00 99.0 99 24 102/59 (73) 99   


 


10/26/17 22:00  101      


 


10/26/17 20:00  112      


 


10/26/17 20:00 99.4 112 29 106/59 (75) 100   


 


10/26/17 19:46     100 Nasal Cannula 2.00 


 


10/26/17 18:00  98      


 


10/26/17 16:15 97.1 112 30 105/64 (78) 100   


 


10/26/17 16:05     (72)  Nasal Cannula 4.00 


 


10/26/17 16:00  95      


 


10/26/17 14:35  115 24 101/57 (72) 100 Nasal Cannula 4.00 














I/O      


 


 10/26/17 10/26/17 10/26/17 10/27/17 10/27/17 10/27/17





 07:00 15:00 23:00 07:00 15:00 23:00


 


Intake Total  650 ml 357 ml   


 


Output Total   300 ml 350 ml  


 


Balance  650 ml 57 ml -350 ml  


 


      


 


Intake IV Total  650 ml 357 ml   


 


Output Urine Total   300 ml 350 ml  


 


# Bowel Movements    0  








Result Diagram:  


10/27/17 1145                                                                  

              10/27/17 0330





Objective Remarks


GENERAL:  This is an averagely built middle-aged white female who is pale and


mildly dyspneic.


HEENT:  Head normocephalic.  Pupils are reactive and equal.  Tongue moist.


Throat is mildly injected.  Nasal mucosa edematous.


NECK:  Supple.  No bruits, thyroid enlargement or lymphadenopathy.


CHEST:  Distant breath sounds at the lung bases with occasional crackles in the


left upper lung field.Wheeze +


HEART:  The heart sounds are irregular, S1-S2.  No murmur.  No S3.


ABDOMEN:  Soft and nontender.  No organomegaly.  Bowel sounds are active.


EXTREMITIES:  Edema 1+ with diminished peripheral pulses.  NEUROLOGIC:


Reflexes are 1+ with no gross motor deficits.  Cranial nerves grossly intact.


RECTAL:   Exam is deferred.


SKIN: Dry and scaly.





Assessment and Plan


Assessment and Plan





 


IMPRESSION


1.  COPD with acute exacerbation


2.  Chronic left basilar effusion with loculation


3.  Atelectasis left lower lobe with mucus plugging.


4.  Hyponatremia.


5.  Abnormal liver enzymes


6.  Anemia and chronic disease.











Plan :








1. O2  2 L.





2. Chest tube  to  suction.





3. Cont  Antibiotics , Rocephin ,Zithro.





4. CBC,CXR in am





5. Taper Solumedrol  to  40 mg q8h





6. May need  CT Guided  needle  biopsy











NATA Grey MD Oct 27, 2017 13:02

## 2017-10-27 NOTE — RADRPT
EXAM DATE/TIME:  10/26/2017 13:22 

 

INDICATIONS :      

Is suspected malignancy with paraneoplastic syndrome. Patient is tachypneic and tachycardic with comp
tyrel loculated left-sided pleural effusion. Image guided chest tube placement been requested.

 

 

SEDATION TIME:       

30 

                     

 

MEDICATION(S):

1.).5 mg midazolam (Versed) IV  

2.) 25 mcg fentanyl (Sublimaze) IV  

 

 

DEVICE(S):

1.) 12 Fr Giovana 

                     

 

FLUID: 

Total volume of 12 cc of red fluid was removed.

Fluid was sent for laboratory ordered studies.

                     

 

MEDICAL HISTORY :     

Chronic obstructive pulmonary disease.   

 

SURGICAL HISTORY :     

None.   

 

ENCOUNTER:     

Initial

 

ACUITY:     

1 day

 

PAIN SCORE:     

0/10

 

LOCATION:     

Left chest

                     

 

PROCEDURE:

1.    CT guided Left thoracentesis with chest tube placement.

2.    Conscious sedation with continuous EKG and oximetry monitoring.

3.    EKG and oximetry remained stable throughout the procedure.

 

 

 

The site was prepped in sterile fashion.  Full sterile technique was used, including cap, mask, steri
le gloves and gown and a large sterile sheet.  Hand hygiene and 2% chlorhexidine and/or betadine/alco
hol prep was utilized per protocol for cutaneous antisepsis.  The skin and subcutaneous tissues were 
infiltrated with local anesthetic solution.  Using automated exposure control and adjustment of the m
A and/or kV according to patient size, radiation dose was kept as low as reasonably achievable to obt
ain optimal diagnostic quality images.  DICOM format image data is available electronically for revie
w and comparison.  

 

Under CT guidance a 12 Persian nonlocking pigtail catheter was placed in the left pleural space. Appro
ximately 12 cc of serosanguineous fluid were gently aspirated out of the chest.  Chest tube was inser
valerie. Post procedural scan show reduction in the amount of fluid with no evidence of pneumothorax.

 

The patient tolerated the procedure well and there were no complications. EKG and oximetry remained s
table throughout the procedure. The patient was sent to recovery in stable condition.

 

CONCLUSION:     Uncomplicated CT-guided thoracentesis.

 

 

 

 Jorge Carrillo MD on October 27, 2017 at 7:19           

Board Certified Radiologist.

 This report was verified electronically.

## 2017-10-27 NOTE — RADRPT
EXAM DATE/TIME:  10/27/2017 08:56 

 

[facility] COMPARISON:     

CT THORAX W CONTRAST, October 26, 2017, 13:07.  CHEST SINGLE AP, October 26, 2017, 10:24.

 

                     

INDICATIONS :     

Shortness of breath, concern for effusion.

                     

 

MEDICAL HISTORY :     

Chronic obstructive pulmonary disease.          

 

SURGICAL HISTORY :     

None.   

 

ENCOUNTER:     

Initial                                        

 

ACUITY:     

1 day      

 

PAIN SCORE:     

5/10

 

LOCATION:     

Left chest 

 

FINDINGS:     

Interval placement of left-sided chest tube. Large loculated left pleural effusion is largely unchang
ed. Continued associated airspace consolidation and left hilar prominence. Mild interstitial prominen
ce throughout the right lung likely due to decreased lung volumes. Cardiomediastinal contours are sta
ble. Remainder of exam is unchanged.

 

 

CONCLUSION:     

1. Interval placement of left-sided chest tube without significant interval improvement in complex lo
culated left-sided pleural effusion.

2. Redemonstration of left hilar mass and associated lower lobe collapse.

 

 

 

 Jorge Carrillo MD on October 27, 2017 at 9:43           

Board Certified Radiologist.

 This report was verified electronically.

## 2017-10-28 NOTE — HHI.CCPN
Subjective


Remarks/Hospital Course


The patient is a 56-year-old female with past medical history of COPD, who 

presented to [facility] ED with complaints of coughing, wheezing. 

The patient states that she had food poisoning last week where she had nausea 

and diarrhea, after she ate suspicious food. She denies any worsening of 

dyspnea from baseline. In addition, she denies any constitutional symptoms. She 

quit smoking five and a half years ago and used to smoke one to two packs per 

day for about 30 years.  Chest x-ray in the ER showed consolidation with large 

effusion and volume loss on the left.  On further history she denies any 

hemoptysis, however, she reports 20 pounds weight loss in the last 4 months and 

decreased p.o. intake.  She is being followed up by Dr. Grey her outpatient 

pulmonologist.  The patient denies any use of oxygen at home, however, she is 

on bronchodilators p.r.n. Her laboratory data is significant for hyponatremia 

with sodium level 125, lactic acid level 2.1.  On arrival to the ER she was 

tachycardic and tachypneic.  When seen the patient is on 4 liters nasal cannula 

with saturation ranges between %.  In the ER she was given Rocephin, 

azithromycin, Solu-Medrol, bronchodilator treatment and IV fluids.  Her current 

blood pressure is 107/61.





10/27 Patient s/p CT guided thoracentesis and CT placement by IR yesterday. She 

is feeling better. 





Subjective:


10/28 She complains of pain and requests increased pain meds. Repeat imaging 

ordered per pulmonology to evaluate mass vs loculated effusion. On NC.





Objective





Vital Signs








  Date Time  Temp Pulse Resp B/P (MAP) Pulse Ox O2 Delivery O2 Flow Rate FiO2


 


10/28/17 12:00 99.0 99 25 119/65 (83) 99   


 


10/27/17 19:45        21


 


10/26/17 19:46      Nasal Cannula 2.00 














Intake and Output   


 


 10/28/17 10/28/17 10/29/17





 08:00 16:00 00:00


 


Output Total 300 ml  


 


Balance -300 ml  








Result Diagram:  


10/28/17 0541                                                                  

              10/28/17 0541





Other Results





Microbiology








 Date/Time


Source Procedure


Growth Status


 


 


 10/26/17 10:10


Nasal Aspirate Influenza Types A,B Antigen (SHAN) - Final


NEGATIVE FOR FLU A AND B ANTIGEN.... Complete


 


 10/26/17 10:05


Urine Clean Catch Urine Culture - Final


Klebsiella Pneumoniae Complete








Imaging





Last Impressions








Chest Tube Insertion 10/26/17 1224 Signed





Impressions: 





 Service Date/Time:  Thursday, October 26, 2017 13:22 - CONCLUSION: 

Uncomplicated 





 CT-guided thoracentesis.     Jorge Carrillo MD 


 


Chest X-Ray 10/26/17 1003 Signed





Impressions: 





 Service Date/Time:  Thursday, October 26, 2017 10:24 - CONCLUSION:  

Combination 





 of extensive consolidation, large effusion and volume loss on the left.     

Wayne Bell MD 


 


Chest CT 10/26/17 0000 Signed





Impressions: 





 Service Date/Time:  Thursday, October 26, 2017 13:07 - CONCLUSION:  1. Either 





 highly complex fluid or soft tissue mass involving the left lung base with 

mass 





 effect. This measures 12.2 x 12.6 x 7.4 cm. An ultrasound canal differentiate. 





 2. 7 mm nodule on the left upper lobe. 3. Material filling the left mainstem 





 bronchus either related to purulent material or mucus. There is resulting 

volume 





 loss of the entire left lung. 4. 4.4 cm ascending aortic aneurysm.     Efrain Gomez Jr., MD 








Objective Remarks


GENERAL: Patient is 57 yo lying in bed in NAD


SKIN: Warm and dry.


HEAD: Normocephalic.


EYES: No scleral icterus. No injection or drainage. 


NECK: Supple, trachea midline. No JVD or lymphadenopathy.


CARDIOVASCULAR: Regular rate and rhythm without murmurs, gallops, or rubs. 


RESPIRATORY: Breath sounds equal bilaterally. No accessory muscle use.


GASTROINTESTINAL: Abdomen soft, non-tender, nondistended. 


MUSCULOSKELETAL: No cyanosis, or edema. 


Neuro: Awake and alert








A/P


Assessment and Plan


1.  Acute respiratory insufficiency.


2.  Left-sided consolidation with pleural effusion.


3.  COPD exacerbation.


4.  Hyponatremia/hypophos


5.  Hypokalemia.


6.  Urinary tract infection.


7.  Mild thrombocytopenia.


8.  Elevated AST.





Plan


Neuro: Awake and alert





Pulm: Oxygen PRN maintain sats above 92%.


         Bronchodilators, solumederol 60mg Q6. Check CXR


        s/p CT guided left-sided thoracentesis and CT placement by IR 

yesterday.  


        Send the pleural fluid for analysis, culture and cytology. N


        CT chest: Either highly complex fluid or soft tissue mass involving the 

left lung base with mass effect. This measures 12.2 x 12.6 x 7.4 cm. 


        4.4 cm ascending aortic aneurysm.


        Will check US chest to differentiate between solid vs cystic mass in 

chest if solid then patient will need CT guided needle biopsy to r/o malignancy.


        Pulm is following-Dr. Grey. 





CV:  Monitor HR and BP and maintain MAP>65mmHg


       Lactic acid level measured at 2.1 in the ED.


      Check Echo to eval LV function





GI: On PO diet





: Monitor renal function I&O's and electrolyte replacement per protocol.


      Will need K, Phos replacement today





ID: Continue with abx( Rocephin and Zithromax) and monitor for signs of 

infections(fever and WBC).


   Follow up on blood and urine cultures,


   Nasal aspirate screening for influenza is negative.





Endo: SSI with Accu-Cheks for glycemic control.





Heme: Monitor CBC.





DVT prophylaxis with SCDs.





Level 3











Alexsandra Parks MD Oct 28, 2017 13:49

## 2017-10-28 NOTE — HHI.PR
Subjective


Remarks


10/28 No events overnight. Patient is lying in bed in NAD





Objective


Vital Signs





Vital Signs








  Date Time  Temp Pulse Resp B/P (MAP) Pulse Ox O2 Delivery O2 Flow Rate FiO2


 


10/28/17 10:00  103      


 


10/28/17 08:00 98.0 99 20 116/62 (80) 97   


 


10/28/17 08:00  99      


 


10/28/17 07:38     96   


 


10/28/17 06:00  91      


 


10/28/17 04:00 98.5 93 14 113/62 (79) 93   


 


10/28/17 04:00  93      


 


10/28/17 02:00  100      


 


10/28/17 00:00 98.3 101 21 112/64 (80) 94   


 


10/28/17 00:00  101      


 


10/27/17 22:00  98      


 


10/27/17 20:00  108      


 


10/27/17 20:00 98.5 108 30 118/68 (85) 96   


 


10/27/17 19:45     96   21


 


10/27/17 18:00  106      


 


10/27/17 16:00 98.4 104 31 109/67 (81) 97   


 


10/27/17 16:00  104      


 


10/27/17 14:00  100      


 


10/27/17 12:00 98.6 97 29 112/61 (78) 98   


 


10/27/17 12:00  97      














I/O      


 


 10/27/17 10/27/17 10/27/17 10/28/17 10/28/17 10/28/17





 07:00 15:00 23:00 07:00 15:00 23:00


 


Intake Total   500 ml   


 


Output Total 350 ml  310 ml 300 ml  


 


Balance -350 ml  190 ml -300 ml  


 


      


 


Intake Oral   500 ml   


 


Output Urine Total 350 ml  300 ml 300 ml  


 


Chest Tube Drainage Total   10 ml   


 


# Bowel Movements 0     








Result Diagram:  


10/28/17 0541                                                                  

              10/28/17 0541





Other Results





Last Impressions








Chest X-Ray 10/27/17 0000 Signed





Impressions: 





 Service Date/Time:  Friday, October 27, 2017 08:56 - CONCLUSION:  1. Interval 





 placement of left-sided chest tube without significant interval improvement in 





 complex loculated left-sided pleural effusion. 2. Redemonstration of left 

hilar 





 mass and associated lower lobe collapse.     Jorge Carrillo MD 


 


Chest Ultrasound 10/27/17 0000 Signed





Impressions: 





 Service Date/Time:  Friday, October 27, 2017 09:59 - CONCLUSION:  No focal 





 collections of anechoic free fluid.  Prominent amount of heterogeneous 





 echotexture material surrounding the left lower chest.     Efrain Samuels MD 


 


Chest Tube Insertion 10/26/17 1224 Signed





Impressions: 





 Service Date/Time:  Thursday, October 26, 2017 13:22 - CONCLUSION: 

Uncomplicated 





 CT-guided thoracentesis.     Jorge Carrillo MD 


 


Chest CT 10/26/17 0000 Signed





Impressions: 





 Service Date/Time:  Thursday, October 26, 2017 13:07 - CONCLUSION:  1. Either 





 highly complex fluid or soft tissue mass involving the left lung base with 

mass 





 effect. This measures 12.2 x 12.6 x 7.4 cm. An ultrasound canal differentiate. 





 2. 7 mm nodule on the left upper lobe. 3. Material filling the left mainstem 





 bronchus either related to purulent material or mucus. There is resulting 

volume 





 loss of the entire left lung. 4. 4.4 cm ascending aortic aneurysm.     Efrain Gomez Jr., MD 








Objective Remarks


GENERAL: Patient is 55 yo lying in bed in NAD


SKIN: Warm and dry.


HEAD: Normocephalic.


EYES: No scleral icterus. No injection or drainage. 


NECK: Supple, trachea midline. No JVD or lymphadenopathy.


CARDIOVASCULAR: Regular rate and rhythm without murmurs, gallops, or rubs. 


RESPIRATORY: Breath sounds equal bilaterally. No accessory muscle use.


GASTROINTESTINAL: Abdomen soft, non-tender, nondistended. 


MUSCULOSKELETAL: No cyanosis, or edema. 


BACK: Nontender without obvious deformity. No CVA tenderness.


Neuro: Awake and alert








A/P


Assessment and Plan


1)Resp Insuff


2)Left-sided consolidation 


3)Exudative pleural effusion.


4)COPD exacerbation.


5)Urinary tract infection.


6)Hx tobacco use


7)Anemia, thrombocytopenia





Plan


Continue with oxygen maintain sats above 92%.


Bronchodilators, solumederol 40mg Q6. 


s/p CT guided left-sided thoracentesis and CT placement by IR 10/26


Exudative effusion. Follow up on cytology


CT chest: Either highly complex fluid or soft tissue mass involving the left 

lung base with mass effect. This measures 12.2 x 12.6 x 7.4 cm.  4.4 cm 

ascending aortic aneurysm.


Will repeat CT chest. Might need CT guided biopsy


Monitor CT drainage.


Continue with abx ( Rocephin,, Zithromax)


Continue treatment plan.











Richa Herrmann MD Oct 28, 2017 11:15

## 2017-10-28 NOTE — PD.TRANSFR
Transfer Summary


Admission Date


Oct 26, 2017 at 12:25


Transfer Date:  Oct 28, 2017


Admitting Diagnosis





respiratory distress, left pleural effusion, hyponatremia, elevated


Diagnoses:  


(1) Pleural effusion on left


Diagnosis:  Principal





(2) Respiratory distress


Diagnosis:  Principal





(3) Hyponatremia


Diagnosis:  Secondary





(4) COPD (chronic obstructive pulmonary disease)


Diagnosis:  Secondary





(5) Tobacco abuse


Diagnosis:  Secondary





Transfer Summary/Subjective


The patient is a 56-year-old female with past medical history of COPD, who 

presented to [facility] ED with complaints of coughing, wheezing. 

The patient states that she had food poisoning last week where she had nausea 

and diarrhea, after she ate suspicious food. She denies any worsening of 

dyspnea from baseline. In addition, she denies any constitutional symptoms. She 

quit smoking five and a half years ago and used to smoke one to two packs per 

day for about 30 years.  Chest x-ray in the ER showed consolidation with large 

effusion and volume loss on the left.  On further history she denies any 

hemoptysis, however, she reports 20 pounds weight loss in the last 4 months and 

decreased p.o. intake.  She is being followed up by Dr. Grey her outpatient 

pulmonologist.  The patient denies any use of oxygen at home, however, she is 

on bronchodilators p.r.n. Her laboratory data is significant for hyponatremia 

with sodium level 125, lactic acid level 2.1.  On arrival to the ER she was 

tachycardic and tachypneic.  When seen the patient is on 4 liters nasal cannula 

with saturation ranges between %.  In the ER she was given Rocephin, 

azithromycin, Solu-Medrol, bronchodilator treatment and IV fluids.  Her current 

blood pressure is 107/61.





Subjective:


10/28 She complains of pain at chest tube site and requests increased pain 

meds. Repeat imaging ordered per pulmonology to evaluate mass vs loculated 

effusion. Pleural fluid studies exudative. Pleural fluid culture NGTD.   On RA.





Objective





Vital Signs








  Date Time  Temp Pulse Resp B/P (MAP) Pulse Ox O2 Delivery O2 Flow Rate FiO2


 


10/28/17 12:00 99.0 99 25 119/65 (83) 99   


 


10/27/17 19:45        21


 


10/26/17 19:46      Nasal Cannula 2.00 














Intake and Output   


 


 10/28/17 10/28/17 10/29/17





 08:00 16:00 00:00


 


Output Total 300 ml  


 


Balance -300 ml  








Result Diagram:  


10/28/17 0541                                                                  

              10/28/17 0541





Other Results





Microbiology








 Date/Time


Source Procedure


Growth Status


 


 


 10/26/17 10:10


Nasal Aspirate Influenza Types A,B Antigen (SHAN) - Final


NEGATIVE FOR FLU A AND B ANTIGEN.... Complete


 


 10/26/17 10:05


Urine Clean Catch Urine Culture - Final


Klebsiella Pneumoniae Complete








Imaging





Last Impressions








Chest Tube Insertion 10/26/17 1224 Signed





Impressions: 





 Service Date/Time:  Thursday, October 26, 2017 13:22 - CONCLUSION: 

Uncomplicated 





 CT-guided thoracentesis.     Jorge Carrillo MD 


 


Chest X-Ray 10/26/17 1003 Signed





Impressions: 





 Service Date/Time:  Thursday, October 26, 2017 10:24 - CONCLUSION:  

Combination 





 of extensive consolidation, large effusion and volume loss on the left.     

Wayne Bell MD 


 


Chest CT 10/26/17 0000 Signed





Impressions: 





 Service Date/Time:  Thursday, October 26, 2017 13:07 - CONCLUSION:  1. Either 





 highly complex fluid or soft tissue mass involving the left lung base with 

mass 





 effect. This measures 12.2 x 12.6 x 7.4 cm. An ultrasound canal differentiate. 





 2. 7 mm nodule on the left upper lobe. 3. Material filling the left mainstem 





 bronchus either related to purulent material or mucus. There is resulting 

volume 





 loss of the entire left lung. 4. 4.4 cm ascending aortic aneurysm.     Efrain Gomez Jr., MD 








Objective Remarks


GENERAL: Patient is 57 yo female who is pale chronically ill appearing, sitting 

up in [facility] bed on RA. Talkative. 


SKIN: Warm and dry.


HEAD: Normocephalic.


EYES: No scleral icterus. No injection or drainage. 


NECK: Supple, trachea midline. No JVD or lymphadenopathy.


CARDIOVASCULAR: Regular rate and rhythm with 3/6 systolic murmur LSB. 


RESPIRATORY: Mildly tachypneic without accessory muscle use.  Diminished breath 

sounds bibasilar, worse on the left. L chest tube to suction with no airleak. 


GASTROINTESTINAL: Abdomen soft, non-tender, nondistended. Bowel sounds present.

  


: Corbett in place with yellow urine output. 


MUSCULOSKELETAL: No cyanosis. Trace edema


NEURO: Awake and alert, moving all extremities with no focal deficit. 





Procedures


CT guided thoracentesis with L chest tube placement 10/26 Dr. Carrillo





A/P


Problem List:  


(1) Pain


ICD Code:  R52 - Pain, unspecified


Status:  Acute


(2) Tobacco abuse


ICD Code:  Z72.0 - Tobacco use


Status:  Chronic


(3) COPD (chronic obstructive pulmonary disease)


ICD Code:  J44.9 - Chronic obstructive pulmonary disease, unspecified


Status:  Chronic


(4) Pleural effusion on left


ICD Code:  J90 - Pleural effusion, not elsewhere classified


Status:  Acute


(5) Hyponatremia


ICD Code:  E87.1 - Hypo-osmolality and hyponatremia


Status:  Acute


(6) Respiratory distress


ICD Code:  R06.00 - Dyspnea, unspecified


Status:  Resolved


(7) UTI (urinary tract infection)


ICD Code:  N39.0 - Urinary tract infection, site not specified


Status:  Acute


(8) Physical deconditioning


ICD Code:  R53.81 - Other malaise


Status:  Chronic


Assessment and Plan


1.  Acute respiratory insufficiency.


2.  Left-sided consolidation with pleural effusion.


3.  COPD exacerbation.


4.  Hyponatremia/hypophos


5.  Hypokalemia.


6.  Urinary tract infection.


7.  Mild thrombocytopenia.


8.  Elevated AST.





Plan


Neuro:


Pain secondary to chest tube site. Lortab 5-10 q6 hours. Morphine prn 

breakthrough pain. 


Deconditioning - PT consult. 





Pulm: 


Left exudative pleural effusion, now s/p chest tube  placement


COPD


Tobacco abuse


?L lung mass vs loculated effusion/


On RA. 


         Bronchodilators, Solumedrol 40 mg IV q6 hours per pulmonology


        s/p CT guided left-sided thoracentesis and CT placement by IR   10/26/17


        Pleural fluid studies exudative.  Pleural fluid culture negative. 

Cytology pending. 


        CT chest: Either highly complex fluid or soft tissue mass involving the 

left lung base with mass effect. This measures 12.2 x 12.6 x 7.4 cm. 


        4.4 cm ascending aortic aneurysm.


        US chest to differentiate between solid vs cystic mass in chest, but 

inconclusive. Repeating CT chest  if solid then patient will need CT guided 

needle biopsy to r/o malignancy.


        Pulm is following-Dr. Grey. 





CV:  


Pulmonary hypertension by echo


Monitor HR and BP and maintain MAP>65mmHg


       Lactic acid level measured at 2.1 in the ED.


      Echo 10/27 - systolic function grossly normal.  I'll do moderate aortic 

valve regurgitation.  Moderate to severe pulmonary hypertension.. 





GI: Heart healthy diet. 





: 


Hyponatremia ?chronic but chronicity not truly known


Hypokalemia, resolved


D/c corbett





ID: 


UTI


Community acquired, possibly post-obstructive pneuonia. 


On rocephin and azithromycin 10/26 #3. UTI present on admission with culture 

positive for pansensitive Klebsiella. Corbett in place now, no longer needed so 

will d/c. D/c Rocephin after 7 days. 


Influenza screen, blood and pleural fluid cultures negative to date. .








Endo: SSI with Accu-Cheks for glycemic control.





Heme: Monitor CBC.





DVT prophylaxis with  SCDs.  Change GI prophylaxis to Protonix 40 mg by mouth 

daily while on steroids.





Patient updated at bedside.  Discussed with Dr. Herrmann who is covering for 

pulmonology.  Transfer patient to floor.  Consult hospitalist to assume care.





Level 2











Alexsandra Parks MD Oct 28, 2017 14:01

## 2017-10-28 NOTE — RADRPT
EXAM DATE/TIME:  10/28/2017 14:44 

 

[facility] COMPARISON:     

US CHEST LEFT, October 27, 2017, 9:59.  CHEST SINGLE AP, October 27, 2017, 8:56.  CT THORAX W CONTRAS
T, October 26, 2017, 13:07.

 

 

INDICATIONS :     

Respiratory distress; mass verses loculated effusion. Status post recent thoracentesis and chest tube
 placement. Abnormal chest ultrasound demonstrating heterogeneous echogenic material in the left pleu
ral space.

                      

 

IV CONTRAST:     

70 cc Omnipaque 350 (iohexol) IV 

 

 

RADIATION DOSE:     

8.81 CTDIvol (mGy) 

 

 

MEDICAL HISTORY :     

None  

 

SURGICAL HISTORY :      

None. 

 

ENCOUNTER:      

Initial

 

ACUITY:      

1 day

 

PAIN SCALE:      

7/10

 

LOCATION:       

Left chest 

 

TECHNIQUE:      

Volumetric scanning of the chest was performed.  Using automated exposure control and adjustment of t
he mA and/or kV according to patient size, radiation dose was kept as low as reasonably achievable to
 obtain optimal diagnostic quality images.   DICOM format image data is available electronically for 
review and comparison.  

 

Follow-up recommendations for detected pulmonary nodules are based at a minimum on nodule size and pa
tient risk factors according to Fleischner Society Guidelines.

 

FINDINGS:     

 

LUNGS:     

Mild consolidation is now noted in the posterior right lower lobe which is new from the recent study.
 Volume loss remains in the left hemithorax with consolidation in the infrahilar region with air bron
chograms.

 

PLEURA:     

There is a new small right pleural effusion. There's been interval placement of a percutaneous left-s
ided chest tube into the low density mildly heterogeneous collection in the left lower hemithorax. Th
e collection measures up to 12.4 x 6.5 cm in diameter compared to 12.6 x 8 cm on the prior study. The
re some peripheral calcifications again noted posteriorly. A

 

MEDIASTINUM:     

The heart and great vessels demonstrate no acute abnormality.  There is no mediastinal or hilar lymph
adenopathy.

 

AXILLAE:     

Within normal limits.  No lymphadenopathy.

 

SKELETAL:     

Within normal limits for patient age.

 

MISCELLANEOUS:     

The visualized upper abdominal organs demonstrate no acute abnormality.

 

CONCLUSION:     

1. Interval placement of left-sided chest tube with slight decrease in the complex low-density fluid 
collection in the lower left hemithorax.

2. Volume loss remains in the left hemithorax with consolidation in the left infrahilar region

3. New small right pleural effusion and consolidation in the right posterior lower lobe

4. The previously noted fluid and debris in the left mainstem bronchus is no longer present. 

 

 

 Ibrahima Mccarthy MD on October 28, 2017 at 15:12           

Board Certified Radiologist.

 This report was verified electronically.

## 2017-10-29 NOTE — HHI.PR
Subjective


Remarks


Patient is lying in bed in NAD. Afebrile.





Objective


Vital Signs





Vital Signs








  Date Time  Temp Pulse Resp B/P (MAP) Pulse Ox O2 Delivery O2 Flow Rate FiO2


 


10/29/17 10:00  109      


 


10/29/17 08:51     96   


 


10/29/17 08:00  102      


 


10/29/17 08:00 97.8 102 36 179/91 (120) 96   


 


10/29/17 06:00  79      


 


10/29/17 04:00 98.2 91 25 114/71 (85) 96   


 


10/29/17 04:00  91      


 


10/29/17 02:00  93      


 


10/29/17 00:00 98.2 86 19 122/74 (90) 96   


 


10/29/17 00:00  86      


 


10/28/17 22:00  86      


 


10/28/17 21:25   18     


 


10/28/17 20:25     98   21


 


10/28/17 20:00 97.9 94 27 121/74 (90) 97   


 


10/28/17 20:00  94      


 


10/28/17 18:08   12     


 


10/28/17 18:00  104      


 


10/28/17 16:00 98.9 94 24 110/68 (82) 95   


 


10/28/17 16:00  94      


 


10/28/17 14:00  105      


 


10/28/17 12:00 99.0 99 25 119/65 (83) 99   


 


10/28/17 12:00  99      














I/O      


 


 10/28/17 10/28/17 10/28/17 10/29/17 10/29/17 10/29/17





 07:00 15:00 23:00 07:00 15:00 23:00


 


Intake Total 660 ml 350 ml 475 ml 0 ml  


 


Output Total 300 ml  325 ml 20 ml  


 


Balance 360 ml 350 ml 150 ml -20 ml  


 


      


 


Intake Oral   475 ml 0 ml  


 


IV Total 660 ml 350 ml    


 


Output Urine Total 300 ml  325 ml 0 ml  


 


Chest Tube Drainage Total   0 ml 20 ml  


 


# Bowel Movements   0 0  








Result Diagram:  


10/29/17 0458                                                                  

              10/29/17 0458





Other Results





Last Impressions








Chest CT 10/28/17 0000 Signed





Impressions: 





 Service Date/Time:  Saturday, October 28, 2017 14:44 - CONCLUSION:  1. 

Interval 





 placement of left-sided chest tube with slight decrease in the complex 





 low-density fluid collection in the lower left hemithorax. 2. Volume loss 





 remains in the left hemithorax with consolidation in the left infrahilar 

region 





 3. New small right pleural effusion and consolidation in the right posterior 





 lower lobe 4. The previously noted fluid and debris in the left mainstem 





 bronchus is no longer present.     Ibrahima Mccarthy MD 


 


Chest X-Ray 10/27/17 0000 Signed





Impressions: 





 Service Date/Time:  Friday, October 27, 2017 08:56 - CONCLUSION:  1. Interval 





 placement of left-sided chest tube without significant interval improvement in 





 complex loculated left-sided pleural effusion. 2. Redemonstration of left 

hilar 





 mass and associated lower lobe collapse.     Jorge Carrillo MD 


 


Chest Ultrasound 10/27/17 0000 Signed





Impressions: 





 Service Date/Time:  Friday, October 27, 2017 09:59 - CONCLUSION:  No focal 





 collections of anechoic free fluid.  Prominent amount of heterogeneous 





 echotexture material surrounding the left lower chest.     Efrain Samuels MD 


 


Chest Tube Insertion 10/26/17 1224 Signed





Impressions: 





 Service Date/Time:  Thursday, October 26, 2017 13:22 - CONCLUSION: 

Uncomplicated 





 CT-guided thoracentesis.     Jorge Carrillo MD 








Objective Remarks


GENERAL: Patient is 57 yo lying in bed in NAD


SKIN: Warm and dry.


HEAD: Normocephalic.


EYES: No scleral icterus. No injection or drainage. 


NECK: Supple, trachea midline. No JVD or lymphadenopathy.


CARDIOVASCULAR: Regular rate and rhythm without murmurs, gallops, or rubs. 


RESPIRATORY: Breath sounds equal bilaterally. No accessory muscle use.


GASTROINTESTINAL: Abdomen soft, non-tender, nondistended. 


MUSCULOSKELETAL: No cyanosis, or edema. 


BACK: Nontender without obvious deformity. No CVA tenderness.


Neuro: Awake and alert








A/P


Assessment and Plan


1)Resp Insuff


2)Left-sided consolidation 


3)Exudative pleural effusion.


4)COPD exacerbation.


5)Urinary tract infection.


6)Hx tobacco use


7)Anemia, thrombocytopenia





Plan


Continue with oxygen maintain sats above 92%.


Bronchodilators, decrease solumederol 40mg Q12. 


s/p CT guided left-sided thoracentesis and CT placement by IR 10/26


Exudative effusion. Follow up on cytology


Repeat CT chest 10/29:  Interval placement of left-sided chest tube with slight 

decrease in the complex low-density fluid collection in the lower left 

hemithorax. Volume loss remains in the left hemithorax with consolidation in 

the left infrahilar region. New small right pleural effusion and consolidation 

in the right posterior lower lobe.


CT chest 10/26 : Either highly complex fluid or soft tissue mass involving the 

left lung base with mass effect. This measures 12.2 x 12.6 x 7.4 cm.  4.4 cm 

ascending aortic aneurysm.


Will consult CTS -eval for decortication. Discussed with Dr. Broussard.


Monitor CT drainage.


Continue with abx ( Rocephin,, Zithromax)


Continue treatment plan.











Richa Herrmann MD Oct 29, 2017 11:39

## 2017-10-29 NOTE — HHI.PR
Subjective


Remarks


The patient was resting in bed comfortably.  She said she could not urinate 

without a Arroyo catheter because she will not let herself.  She is requesting a 

catheter for 1-2 days.  She says her breathing is fine.  She says she has not 

had a bowel movement since her bout with diarrhea.  She says the chest tube is 

not draining.  It is tender on the left side.  Discussed with nursing and her 

family.





Objective


Vitals





Vital Signs








  Date Time  Temp Pulse Resp B/P (MAP) Pulse Ox O2 Delivery O2 Flow Rate FiO2


 


10/29/17 10:00  109      


 


10/29/17 08:51     96   


 


10/29/17 08:00  102      


 


10/29/17 08:00 97.8 102 36 179/91 (120) 96   


 


10/29/17 06:00  79      


 


10/29/17 04:00 98.2 91 25 114/71 (85) 96   


 


10/29/17 04:00  91      


 


10/29/17 02:00  93      


 


10/29/17 00:00 98.2 86 19 122/74 (90) 96   


 


10/29/17 00:00  86      


 


10/28/17 22:00  86      


 


10/28/17 21:25   18     


 


10/28/17 20:25     98   21


 


10/28/17 20:00 97.9 94 27 121/74 (90) 97   


 


10/28/17 20:00  94      


 


10/28/17 18:08   12     


 


10/28/17 18:00  104      


 


10/28/17 16:00 98.9 94 24 110/68 (82) 95   


 


10/28/17 16:00  94      


 


10/28/17 14:00  105      


 


10/28/17 12:00 99.0 99 25 119/65 (83) 99   


 


10/28/17 12:00  99      


 


10/28/17 11:17   28     














I/O      


 


 10/28/17 10/28/17 10/28/17 10/29/17 10/29/17 10/29/17





 07:00 15:00 23:00 07:00 15:00 23:00


 


Intake Total 660 ml 350 ml 475 ml 0 ml  


 


Output Total 300 ml  325 ml 20 ml  


 


Balance 360 ml 350 ml 150 ml -20 ml  


 


      


 


Intake Oral   475 ml 0 ml  


 


IV Total 660 ml 350 ml    


 


Output Urine Total 300 ml  325 ml 0 ml  


 


Chest Tube Drainage Total   0 ml 20 ml  


 


# Bowel Movements   0 0  








Result Diagram:  


10/29/17 0458                                                                  

              10/29/17 0458





Imaging





Last Impressions








Chest CT 10/28/17 0000 Signed





Impressions: 





 Service Date/Time:  Saturday, October 28, 2017 14:44 - CONCLUSION:  1. 

Interval 





 placement of left-sided chest tube with slight decrease in the complex 





 low-density fluid collection in the lower left hemithorax. 2. Volume loss 





 remains in the left hemithorax with consolidation in the left infrahilar 

region 





 3. New small right pleural effusion and consolidation in the right posterior 





 lower lobe 4. The previously noted fluid and debris in the left mainstem 





 bronchus is no longer present.     Ibrahima Mccarthy MD 


 


Chest X-Ray 10/27/17 0000 Signed





Impressions: 





 Service Date/Time:  Friday, October 27, 2017 08:56 - CONCLUSION:  1. Interval 





 placement of left-sided chest tube without significant interval improvement in 





 complex loculated left-sided pleural effusion. 2. Redemonstration of left 

hilar 





 mass and associated lower lobe collapse.     Jorge Carrillo MD 


 


Chest Ultrasound 10/27/17 0000 Signed





Impressions: 





 Service Date/Time:  Friday, October 27, 2017 09:59 - CONCLUSION:  No focal 





 collections of anechoic free fluid.  Prominent amount of heterogeneous 





 echotexture material surrounding the left lower chest.     Efrain Samuels MD 


 


Chest Tube Insertion 10/26/17 1224 Signed





Impressions: 





 Service Date/Time:  Thursday, October 26, 2017 13:22 - CONCLUSION: 

Uncomplicated 





 CT-guided thoracentesis.     Jorge Carrillo MD 








Objective Remarks


GENERAL: Resting comfortably.


SKIN: Warm and dry.


HEAD: Normocephalic.


EYES: No scleral icterus. No injection or drainage. 


NECK: Supple, trachea midline. No JVD or lymphadenopathy.


CARDIOVASCULAR: Regular rate and rhythm with 3/6 systolic murmur LSB. 


RESPIRATORY: Diminished breath sounds bibasilar, worse on the left. L chest 

tube to suction with no airleak. 


GASTROINTESTINAL: Abdomen soft, non-tender, nondistended. Bowel sounds present.

  


MUSCULOSKELETAL: No cyanosis. Trace edema


NEURO: Awake and alert, moving all extremities with no focal deficit. 


PSYCH: Mood and affect appropriate.


Procedures


CT guided thoracentesis with L chest tube placement 10/26 Dr. Carrillo


Medications and IVs





Current Medications








 Medications


  (Trade)  Dose


 Ordered  Sig/Emre


 Route  Start Time


 Stop Time Status Last Admin


 


  (NS Flush)  2 ml  UNSCH  PRN


 IVF  10/26/17 10:15


     


 


 


  (Duoneb Neb)  1 ampule  Q4HR  NEB


 INH  10/26/17 12:00


    10/29/17 08:49


 


 


  (Duoneb Neb)  1 ampule  Q2HR NEB  PRN


 INH  10/26/17 11:30


    10/27/17 04:19


 


 


 Miscellaneous


 Information  1  Q361D


 XX  10/26/17 11:30


    10/26/17 21:22


 


 


  (Chlorhexidine


 2% Cloth)  3 pack


 Taper  DAILY@04


 TOP  10/27/17 04:00


 10/23/18 03:59  10/29/17 04:00


 


 


  (Chlorhexidine


 2% Cloth)  3 pack  UNSCH  PRN


 TOP  10/26/17 11:30


     


 


 


  (Jess-Colace)  1 tab  BID


 PO  10/26/17 21:00


    10/29/17 08:34


 


 


  (Milk Of


 Magnesia Liq)  30 ml  Q12H  PRN


 PO  10/26/17 11:30


     


 


 


  (Senokot)  17.2 mg  Q12H  PRN


 PO  10/26/17 11:30


     


 


 


  (Dulcolax Supp)  10 mg  DAILY  PRN


 RECTAL  10/26/17 11:30


     


 


 


  (Lactulose Liq)  30 ml  DAILY  PRN


 PO  10/26/17 11:30


     


 


 


  (D50w (Vial) Inj)  50 ml  UNSCH  PRN


 IV PUSH  10/26/17 11:30


     


 


 


  (Glucagon Inj)  1 mg  UNSCH  PRN


 OTHER  10/26/17 11:30


     


 


 


  (NovoLIN R


 SUPPLEMENTAL


 SCALE)  1  Q6H


 SQ  10/26/17 11:30


    10/29/17 00:19


 


 


 Ceftriaxone


 Sodium 1000 mg/


 Sodium Chloride  100 ml @ 


 200 mls/hr  Q24H


 IV  10/27/17 12:00


 11/1/17 11:59  10/28/17 11:13


 


 


  (Tylenol)  650 mg  Q6H  PRN


 PO  10/27/17 04:15


    10/27/17 04:13


 


 


  (SoluMEDROL INJ)  40 mg  Q6HR


 IV PUSH  10/27/17 18:00


    10/29/17 05:12


 


 


  (Tylenol)  650 mg  Q6H  PRN


 PO  10/27/17 22:30


     


 


 


  (Morphine Inj)  2 mg  Q4H  PRN


 IV  10/28/17 14:30


    10/29/17 10:48


 


 


  (Norco  5-325 Mg)  1 tab  Q6H  PRN


 PO  10/28/17 13:45


    10/28/17 17:08


 


 


  (Norco  5-325 Mg)  2 tab  Q6H  PRN


 PO  10/28/17 13:45


    10/29/17 05:07


 


 


  (Protonix)  40 mg  DAILY


 PO  10/29/17 09:00


    10/29/17 08:34


 


 


 Azithromycin 500


 mg/Sodium Chloride  250 ml @ 


 250 mls/hr  Q24H


 IV  10/30/17 13:00


     


 











A/P


Problem List:  


(1) Pleural effusion on left


ICD Code:  J90 - Pleural effusion, not elsewhere classified


Status:  Acute


(2) Respiratory distress


ICD Code:  R06.00 - Dyspnea, unspecified


Status:  Resolved


(3) Hyponatremia


ICD Code:  E87.1 - Hypo-osmolality and hyponatremia


Status:  Acute


(4) COPD (chronic obstructive pulmonary disease)


ICD Code:  J44.9 - Chronic obstructive pulmonary disease, unspecified


Status:  Chronic


(5) Tobacco abuse


ICD Code:  Z72.0 - Tobacco use


Status:  Chronic


Assessment and Plan


Left exudative pleural effusion/ CAP/ COPD


CT chest: Either highly complex fluid or soft tissue mass involving the left 

lung base with mass effect. This measures 12.2 x 12.6 x 7.4 cm.  S/p CT guided 

left-sided thoracentesis and CT placement by IR 10/26/17. No recent drainage. 

Pleural fluid studies exudative.  Pleural fluid culture negative. Repeat CT 

showed: Interval placement of left-sided chest tube with slight decrease in the 

complex low-density fluid collection in the lower left hemithorax; Volume loss 

remains in the left hemithorax with consolidation in the left infrahilar region

; New small right pleural effusion and consolidation in the right posterior 

lower lob.


- Bronchodilators, Solumedrol 40 mg IV q6 hours per pulmonology.


- Cytology pending. 


- Pulm is following-Dr. Grey. 


- encourage ambulation, IS.


- continue Rocephin and azithromycin started on 10/26.





Pulmonary hypertension


Echo 10/27 - systolic function grossly normal; mild to moderate aortic valve 

regurgitation; moderate to severe pulmonary hypertension.


- pulmonology following.





Urinary retention/ UTI


Klebsiella noted in urine. The pt said she will not urinate without a Arroyo and 

requests an additional 1-2 days of Arroyo.


- Arroyo ordered 10/29. Should be removed by 10/31.


- continue antibiotics.





Pancytopenia


Possibly s/t malignant process.


- follow pleural cytology.


- follow CBC.





AAA


Imaging revealed a  4.4 cm ascending aortic aneurysm.


- outpt follow-up.





PPx: SCDs


Discharge Planning


Awaiting improvement, transfer to floor











Ibrahima Mckinley DO Oct 29, 2017 11:26

## 2017-10-30 NOTE — RADRPT
EXAM DATE/TIME:  10/30/2017 21:18 

 

CORRECTION

Corrected on: 

October 31, 2017; corrected date to 30th from 31st. 

 

 

 

[facility] COMPARISON:     CT THORAX W CONTRAST, October 28, 2017, 14:44.  CHEST SINGLE AP, October 27, 2017, 8:56.

 

                     

INDICATIONS :     Chest pain.. Followup left effusion.

                     

MEDICAL HISTORY :            Chronic obstructive pulmonary disease. Blood transfusion.   

SURGICAL HISTORY :        Left chest tube.

ENCOUNTER:     Initial                                        

ACUITY:     1 day      

PAIN SCORE:     10/10

LOCATION:     Bilateral chest 

 

FINDINGS:     

A single AP erect portable view of the chest was obtained and again demonstrates a moderate size left
 pleural effusion without significant change. There is mild hazy opacity now noted the right lung bas
e. The heart size is at the upper limits of normal.

 

CONCLUSION:     No significant change in the moderate left effusion. There is mild patchy opacity now
 noted the right lung base. 

 

 Ibrahima Mccarthy MD on October 30, 2017 at 21:30           

 

 

 

     

Board Certified Radiologist.

 This report was verified electronically.

## 2017-10-30 NOTE — HHI.PR
Subjective


Remarks


Has some pain where the chest tube is located. Just got pain medication that 

brought it down to a 3/10. no SOB. no nausea or vomiting.





Objective


Vitals





Vital Signs








  Date Time  Temp Pulse Resp B/P (MAP) Pulse Ox O2 Delivery O2 Flow Rate FiO2


 


10/30/17 08:00 97.6 102 18 141/82 (101) 99   


 


10/30/17 08:00     98   


 


10/30/17 04:00 97.5 95 18 132/71 (91) 95   


 


10/30/17 00:00 97.9 100 18 123/77 (92) 97   


 


10/29/17 23:30  98      


 


10/29/17 22:56 97.4 104 20 146/94 (111) 95   


 


10/29/17 20:09     97   21


 


10/29/17 20:00  94      


 


10/29/17 20:00 98.6 94 24 115/72 (86) 96   


 


10/29/17 18:00  85      


 


10/29/17 16:00  87      


 


10/29/17 16:00 97.8 87 15 121/66 (84) 96   


 


10/29/17 15:45   15     


 


10/29/17 14:00  98      














I/O      


 


 10/29/17 10/29/17 10/29/17 10/30/17 10/30/17 10/30/17





 07:00 15:00 23:00 07:00 15:00 23:00


 


Intake Total 0 ml  700 ml 240 ml  


 


Output Total 20 ml  530 ml 350 ml  


 


Balance -20 ml  170 ml -110 ml  


 


      


 


Intake Oral 0 ml  600 ml 240 ml  


 


IV Total   100 ml   


 


Output Urine Total 0 ml  525 ml 350 ml  


 


Chest Tube Drainage Total 20 ml  5 ml   


 


# Bowel Movements 0  0   








Result Diagram:  


10/30/17 0753                                                                  

              10/29/17 0458





Imaging





Last Impressions








Chest CT 10/28/17 0000 Signed





Impressions: 





 Service Date/Time:  Saturday, October 28, 2017 14:44 - CONCLUSION:  1. 

Interval 





 placement of left-sided chest tube with slight decrease in the complex 





 low-density fluid collection in the lower left hemithorax. 2. Volume loss 





 remains in the left hemithorax with consolidation in the left infrahilar 

region 





 3. New small right pleural effusion and consolidation in the right posterior 





 lower lobe 4. The previously noted fluid and debris in the left mainstem 





 bronchus is no longer present.     Ibrahima Mccarthy MD 


 


Chest X-Ray 10/27/17 0000 Signed





Impressions: 





 Service Date/Time:  Friday, October 27, 2017 08:56 - CONCLUSION:  1. Interval 





 placement of left-sided chest tube without significant interval improvement in 





 complex loculated left-sided pleural effusion. 2. Redemonstration of left 

hilar 





 mass and associated lower lobe collapse.     Jorge Carrillo MD 


 


Chest Ultrasound 10/27/17 0000 Signed





Impressions: 





 Service Date/Time:  Friday, October 27, 2017 09:59 - CONCLUSION:  No focal 





 collections of anechoic free fluid.  Prominent amount of heterogeneous 





 echotexture material surrounding the left lower chest.     Efrain Samuels MD 


 


Chest Tube Insertion 10/26/17 1224 Signed





Impressions: 





 Service Date/Time:  Thursday, October 26, 2017 13:22 - CONCLUSION: 

Uncomplicated 





 CT-guided thoracentesis.     Jorge Carrillo MD 








Objective Remarks


GENERAL: Patient is 57 yo lying in bed finishing up a breathing treatment.


SKIN: chest tube on the left chest. some Bruises noted throughout chest


EYES: EOMI. 


NECK: Supple, trachea midline.  


CARDIOVASCULAR: Regular rate and rhythm without murmurs 


RESPIRATORY: Breath sounds equal bilaterally. 


GASTROINTESTINAL: Abdomen soft, non-tender, nondistended. 


MUSCULOSKELETAL: No  edema. 


Neuro: Awake and alert


Procedures


CT guided thoracentesis with L chest tube placement 10/26 Dr. Carrillo





A/P


Problem List:  


(1) Pleural effusion on left


ICD Code:  J90 - Pleural effusion, not elsewhere classified


Status:  Acute


(2) Respiratory distress


ICD Code:  R06.00 - Dyspnea, unspecified


Status:  Resolved


(3) Hyponatremia


ICD Code:  E87.1 - Hypo-osmolality and hyponatremia


Status:  Acute


(4) COPD (chronic obstructive pulmonary disease)


ICD Code:  J44.9 - Chronic obstructive pulmonary disease, unspecified


Status:  Chronic


(5) Tobacco abuse


ICD Code:  Z72.0 - Tobacco use


Status:  Chronic


Assessment and Plan


1)Resp Insuff


2)Left-sided consolidation 


3)Exudative pleural effusion.


4)COPD exacerbation.


5)Urinary tract infection.


6)Hx tobacco use


7)Anemia, thrombocytopenia





Plan


Continue with oxygen maintain sats above 92%.


Bronchodilators, on solumederol 40mg Q12. continue to taper. Pt is known to Dr. Grey (outpatient)


s/p CT guided left-sided thoracentesis and CT placement by IR 10/26


Exudative effusion. Cytology still pending. Fluid cx neg x 72hours.


Repeat CT chest 10/29:  Interval placement of left-sided chest tube with slight 

decrease in the complex low-density fluid collection in the lower left 

hemithorax. Volume loss remains in the left hemithorax with consolidation in 

the left infrahilar region. New small right pleural effusion and consolidation 

in the right posterior lower lobe.


CT chest 10/26 : Either highly complex fluid or soft tissue mass involving the 

left lung base with mass effect. This measures 12.2 x 12.6 x 7.4 cm.  4.4 cm 

ascending aortic aneurysm.


CTS has been consulted for -eval for decortication. Dr. Allison is aware.


Monitor CT drainage.


Continue with abx ( Rocephin,, Zithromax)


Discharge Planning


awaiting recs from CTS











Darby Murillo MD Oct 30, 2017 12:29

## 2017-10-30 NOTE — PD.CONS
History of Present Illness


Service


CT Surgery


Consult Requested By


Dr. Herrmann


Reason for Consult


Loculated left pleural effusion


Primary Care Physician


NATA Grey MD


Diagnoses:  


History of Present Illness


56-year-old white female with a history of COPD and chronic


bronchitis who apparently was suffering from food poisoning  over the past one


week and became quite weak, had nausea, diarrhea, abdominal discomfort and


dehydration.  The patient states that she was unable to pass urine and became


very weak, had lost weight and thus came to the ER and subsequently was


admitted.  She c/o 20 lb weight loss over the past 4 months.


 


The patient did have a chest x-ray which showed an area of  effusion with


volume loss in the left side. She then had a chest CT done which showed


evidence of a loculated  mass-like infiltrate in the left lung base  12 x 12


cm. There was also a  left upper lobe lung nodule 7 mm.  The patient was 

started on Solu-Medrol,


IV antibiotics including Rocephin and Zithromax and now in the ICU and seems to


be feeling better.  Denies chest pains or abdominal pains or nausea or


vomiting.





She underwent chest tube placement in IR with minimal drainage of this 

effusion.  I was consulted to evaluate her for possible surgical exploration, 

drainage, and decortication.





Review of Systems


Constitutional:  COMPLAINS OF: Fatigue, Weight loss, Change in appetite, DENIES

: Diaphoretic episodes, Fever, Weight gain, Chills, Dizziness, Night Sweats


Endocrine:  DENIES: Abnorml menstrual pattern, Heat/cold intolerance, Polydipsia

, Polyuria, Polyphagia


Eyes:  DENIES: Blurred vision, Diplopia, Eye inflammation, Eye pain, Vision loss

, Photosensitivity, Double Vision


Ears, nose, mouth, throat:  DENIES: Tinnitus, Hearing loss, Vertigo, Nasal 

discharge, Oral lesions, Throat pain, Hoarseness, Ear Pain, Running Nose, 

Epistaxis, Sinus Pain, Toothache, Odynophagia


Respiratory:  COMPLAINS OF: Cough, Wheezing, Shortness of breath, DENIES: Apneas

, Snoring, Hemoptysis, Sputum production


Cardiovascular:  COMPLAINS OF: Dyspnea on Exertion, DENIES: Chest pain, 

Palpitations, Syncope, PND, Lower Extremity Edema, Orthopnea, Claudication


Gastrointestinal:  DENIES: Abdominal pain, Black stools, Bloody stools, 

Constipation, Diarrhea, Nausea, Vomiting, Difficulty Swallowing, Anorexia


Genitourinary:  DENIES: Abnormal vaginal bleeding, Dysmenorrhea, Dyspareunia, 

Sexual dysfunction, Urinary frequency, Urinary incontinence, Urgency, Hematuria

, Dysuria, Nocturia, Vaginal discharge


Musculoskeletal:  DENIES: Joint pain, Muscle aches, Stiffness, Joint Swelling, 

Back pain, Neck pain


Integumentary:  DENIES: Abnormal pigmentation, Pruritus, Rash, Nail changes, 

Breast masses, Breast skin changes, Nipple discharge


Hematologic/lymphatic:  DENIES: Bruising, Lymphadenopathy


Immunologic/allergic:  DENIES: Eczema, Urticaria


Neurologic:  DENIES: Abnormal gait, Headache, Localized weakness, Paresthesias, 

Seizures, Speech Problems, Tremor, Poor Balance


Psychiatric:  DENIES: Anxiety, Confusion, Mood changes, Depression, 

Hallucinations, Agitation, Suicidal Ideation, Homicidal Ideation, Delusions





Past Family Social History


Allergies:  


Coded Allergies:  


     No Known Allergies (Verified , 10/26/17)


Past Medical History


1.  History of COPD.


2.  She has had severe anemia for many years.


3.  Fluid retention and chronic leg edema in the past


4.  Chronic pleural effusions which required thoracentesis in the past.


5.  The patient has had no other significant surgical history.  6.  She was


mildly hypertensive in the past.


 


 


MEDICATIONS


Lasix 20 mg daily.





 


FAMILY HISTORY


Father had a history of lung cancer.


Active Ordered Medications





Current Medications








 Medications


  (Trade)  Dose


 Ordered  Sig/Emre


 Route  Start Time


 Stop Time Status Last Admin


 


  (NS Flush)  2 ml  UNSCH  PRN


 IVF  10/26/17 10:15


     


 


 


  (Duoneb Neb)  1 ampule  Q2HR NEB  PRN


 INH  10/26/17 11:30


    10/30/17 12:08


 


 


 Miscellaneous


 Information  1  Q361D


 XX  10/26/17 11:30


    10/26/17 21:22


 


 


  (Chlorhexidine


 2% Cloth)  3 pack


 Taper  DAILY@04


 TOP  10/27/17 04:00


 10/23/18 03:59  10/29/17 04:00


 


 


  (Chlorhexidine


 2% Cloth)  3 pack  UNSCH  PRN


 TOP  10/26/17 11:30


     


 


 


  (Jess-Colace)  1 tab  BID


 PO  10/26/17 21:00


    10/30/17 10:06


 


 


  (Milk Of


 Magnesia Liq)  30 ml  Q12H  PRN


 PO  10/26/17 11:30


     


 


 


  (Senokot)  17.2 mg  Q12H  PRN


 PO  10/26/17 11:30


     


 


 


  (Dulcolax Supp)  10 mg  DAILY  PRN


 RECTAL  10/26/17 11:30


     


 


 


  (Lactulose Liq)  30 ml  DAILY  PRN


 PO  10/26/17 11:30


     


 


 


  (D50w (Vial) Inj)  50 ml  UNSCH  PRN


 IV PUSH  10/26/17 11:30


     


 


 


  (Glucagon Inj)  1 mg  UNSCH  PRN


 OTHER  10/26/17 11:30


     


 


 


  (NovoLIN R


 SUPPLEMENTAL


 SCALE)  1  Q6H


 SQ  10/26/17 11:30


    10/29/17 17:30


 


 


 Ceftriaxone


 Sodium 1000 mg/


 Sodium Chloride  100 ml @ 


 200 mls/hr  Q24H


 IV  10/27/17 12:00


 11/1/17 11:59  10/30/17 13:00


 


 


  (Tylenol)  650 mg  Q6H  PRN


 PO  10/27/17 22:30


     


 


 


  (Morphine Inj)  2 mg  Q4H  PRN


 IV  10/28/17 14:30


    10/29/17 10:48


 


 


  (Norco  5-325 Mg)  1 tab  Q6H  PRN


 PO  10/28/17 13:45


    10/30/17 05:18


 


 


  (Norco  5-325 Mg)  2 tab  Q6H  PRN


 PO  10/28/17 13:45


    10/30/17 10:49


 


 


  (Protonix)  40 mg  DAILY


 PO  10/29/17 09:00


    10/30/17 10:06


 


 


 Azithromycin 500


 mg/Sodium Chloride  250 ml @ 


 250 mls/hr  Q24H


 IV  10/30/17 13:00


    10/30/17 13:00


 


 


  (SoluMEDROL INJ)  40 mg  Q12HR


 IV PUSH  10/29/17 21:00


    10/30/17 10:06


 








Social History


The patient smoked one-pack per day for 30 years and quit five and a half


years ago.  Occasional alcohol use.





Physical Exam


Vital Signs





Vital Signs








  Date Time  Temp Pulse Resp B/P (MAP) Pulse Ox O2 Delivery O2 Flow Rate FiO2


 


10/30/17 16:00 98.0 99 18 122/76 (91) 94   


 


10/30/17 12:00 97.7 91 18 139/83 (101) 95   


 


10/30/17 08:20  101      


 


10/30/17 08:00 97.6 102 18 141/82 (101) 99   


 


10/30/17 08:00     98   


 


10/30/17 04:00 97.5 95 18 132/71 (91) 95   


 


10/30/17 00:00 97.9 100 18 123/77 (92) 97   


 


10/29/17 23:30  98      


 


10/29/17 22:56 97.4 104 20 146/94 (111) 95   


 


10/29/17 20:09     97   21


 


10/29/17 20:00  94      


 


10/29/17 20:00 98.6 94 24 115/72 (86) 96   


 


10/29/17 18:00  85      








Physical Exam


GENERAL: This is an ill-appearing 55 y/o female in no apparent distress.


SKIN: No rashes, ecchymoses or lesions. Cool and dry.


HEAD: Atraumatic. Normocephalic. No temporal or scalp tenderness.


EYES: Pupils equal round and reactive. Extraocular motions intact. No scleral 

icterus. No injection or drainage. 


ENT: Nose without bleeding, purulent drainage or septal hematoma. Throat 

without erythema, tonsillar hypertrophy or exudate. Uvula midline. Airway 

patent.


NECK: Trachea midline. No JVD or lymphadenopathy. Supple, nontender, no 

meningeal signs.


CARDIOVASCULAR: Regular rate and rhythm without murmurs, gallops, or rubs. 


RESPIRATORY: Decreased BS on left, few expiratory wheezes bilaterally  


GASTROINTESTINAL: Abdomen soft, non-tender, nondistended. No hepato-splenomegaly

, or palpable masses. No guarding.


MUSCULOSKELETAL: Extremities without clubbing, cyanosis, or edema. No joint 

tenderness, effusion, or edema noted. No calf tenderness. Negative Homans sign 

bilaterally.


NEUROLOGICAL: Awake and alert. Cranial nerves II through XII intact.  Motor and 

sensory grossly within normal limits. Five out of 5 muscle strength in all 

muscle groups.  Normal speech.


Laboratory





Laboratory Tests








Test


  10/30/17


07:53


 


White Blood Count 3.9 


 


Red Blood Count 2.27 


 


Hemoglobin 8.5 


 


Hematocrit 25.4 


 


Mean Corpuscular Volume 111.9 


 


Mean Corpuscular Hemoglobin 37.3 


 


Mean Corpuscular Hemoglobin


Concent 33.3 


 


 


Red Cell Distribution Width 14.7 


 


Platelet Count 81 


 


Mean Platelet Volume 10.0 


 


Neutrophils (%) (Auto) 77.0 


 


Lymphocytes (%) (Auto) 12.1 


 


Monocytes (%) (Auto) 10.7 


 


Eosinophils (%) (Auto) 0.0 


 


Basophils (%) (Auto) 0.2 


 


Neutrophils # (Auto) 3.0 


 


Lymphocytes # (Auto) 0.5 


 


Monocytes # (Auto) 0.4 


 


Eosinophils # (Auto) 0.0 


 


Basophils # (Auto) 0.0 


 


CBC Comment AUTO DIFF 


 


Differential Comment


  AUTO DIFF


CONFIRMED














 Date/Time


Source Procedure


Growth Status


 


 


 10/26/17 10:10


Blood Peripheral Aerobic Blood Culture - Preliminary


NO GROWTH IN 4 DAYS Resulted


 


 10/26/17 10:10


Blood Peripheral Anaerobic Blood Culture - Preliminary


NO GROWTH IN 4 DAYS Resulted





 10/26/17 14:00


Fluid Pleural Fluid Gram Stain - Final Complete


 


 10/26/17 14:00


Fluid Pleural Fluid Body Fluid Culture - Final


NO GROWTH IN 72 HRS.--AEROBICALLY OR ... Complete


 


 10/26/17 10:10


Nasal Aspirate Influenza Types A,B Antigen (SHAN) - Final


NEGATIVE FOR FLU A AND B ANTIGEN.... Complete


 


 10/26/17 10:05


Urine Clean Catch Urine Culture - Final


Klebsiella Pneumoniae Complete








Result Diagram:  


10/30/17 0753                                                                  

              10/29/17 0458





Imaging





Last Impressions








Chest CT 10/28/17 0000 Signed





Impressions: 





 Service Date/Time:  Saturday, October 28, 2017 14:44 - CONCLUSION:  1. 

Interval 





 placement of left-sided chest tube with slight decrease in the complex 





 low-density fluid collection in the lower left hemithorax. 2. Volume loss 





 remains in the left hemithorax with consolidation in the left infrahilar 

region 





 3. New small right pleural effusion and consolidation in the right posterior 





 lower lobe 4. The previously noted fluid and debris in the left mainstem 





 bronchus is no longer present.     Ibrahima Mccarthy MD 


 


Chest X-Ray 10/27/17 0000 Signed





Impressions: 





 Service Date/Time:  Friday, October 27, 2017 08:56 - CONCLUSION:  1. Interval 





 placement of left-sided chest tube without significant interval improvement in 





 complex loculated left-sided pleural effusion. 2. Redemonstration of left 

hilar 





 mass and associated lower lobe collapse.     Jorge Carrillo MD 


 


Chest Ultrasound 10/27/17 0000 Signed





Impressions: 





 Service Date/Time:  Friday, October 27, 2017 09:59 - CONCLUSION:  No focal 





 collections of anechoic free fluid.  Prominent amount of heterogeneous 





 echotexture material surrounding the left lower chest.     Efrain Samuels MD 


 


Chest Tube Insertion 10/26/17 1224 Signed





Impressions: 





 Service Date/Time:  Thursday, October 26, 2017 13:22 - CONCLUSION: 

Uncomplicated 





 CT-guided thoracentesis.     Jorge Carrillo MD 








Course


Patient is stable, but c/o persistent deconditioning/fatigue.  She also has 

developed a pancytopenia.  She verbally expresses her wishes NOT to undergo 

decortication or any surgical intervention.





Assessment and Plan


Problem List:  


(1) Pleural effusion on left


ICD Codes:  J90 - Pleural effusion, not elsewhere classified


Status:  Acute


(2) COPD (chronic obstructive pulmonary disease)


ICD Codes:  J44.9 - Chronic obstructive pulmonary disease, unspecified


Status:  Chronic


Assessment and Plan


Unfortunate 57y/o female with loculated left pleural effusion vs underlying 

mass.  I attempted to discuss surgical exploration for biopsy, drainage and 

possible decortication.  She refuses surgical intervention.


Discussed Condition With


patient





Problem Qualifiers





(1) COPD (chronic obstructive pulmonary disease):  


Qualified Codes:  J43.1 - Panlobular emphysema








Jemima Wallace MD Oct 30, 2017 18:12

## 2017-10-31 NOTE — HHI.PR
Subjective


Remarks


Pt states that she is feeling better. Chest tube came out last night. no 

worsening SOB since. States that she really wants to work aggressively w PT. 

Her baseline is that she is able to at least stand up on her own and she is 

hopeful that she can go back to this prior to being discharged home. She has a 

sofa w tables around where her  puts things for her so she can help 

herself but she is too weak to sit up or stand at this time. 


She is self pay and cannot afford going to rehab and get home health PT.





Objective


Vitals





Vital Signs








  Date Time  Temp Pulse Resp B/P (MAP) Pulse Ox O2 Delivery O2 Flow Rate FiO2


 


10/31/17 12:00 97.6 100 20 136/88 (104) 97   


 


10/31/17 08:01  91      


 


10/31/17 08:00 97.6 93 20 149/84 (105) 95   


 


10/31/17 07:00      Room Air  


 


10/31/17 04:00 97.8 101 20 138/59 (85) 95   


 


10/31/17 04:00      Room Air  


 


10/31/17 00:00 97.7 96 20 145/88 (107) 96   


 


10/31/17 00:00      Room Air  


 


10/30/17 20:00 97.6 107 20 127/81 (96) 95   


 


10/30/17 20:00      Room Air  


 


10/30/17 20:00  98      


 


10/30/17 16:00 98.0 99 18 122/76 (91) 94   














I/O      


 


 10/30/17 10/30/17 10/30/17 10/31/17 10/31/17 10/31/17





 07:00 15:00 23:00 07:00 15:00 23:00


 


Intake Total 240 ml 480 ml  240 ml  


 


Output Total 350 ml 500 ml 12 ml 900 ml  


 


Balance -110 ml -20 ml -12 ml -660 ml  


 


      


 


Intake Oral 240 ml 480 ml  240 ml  


 


Output Urine Total 350 ml 500 ml  900 ml  


 


Chest Tube Drainage Total   12 ml   








Result Diagram:  


10/31/17 0615                                                                  

              10/31/17 0615





Imaging





Last Impressions








Chest X-Ray 10/30/17 0000 Signed





Impressions: 





 Service Date/Time:  Monday, October 30, 2017 21:18 - CONCLUSION: No 

significant 





 change in the moderate left effusion. There is mild patchy opacity now noted 

the 





 right lung base.    Ibrahima Mccarthy MD 


 


Chest CT 10/28/17 0000 Signed





Impressions: 





 Service Date/Time:  Saturday, October 28, 2017 14:44 - CONCLUSION:  1. 

Interval 





 placement of left-sided chest tube with slight decrease in the complex 





 low-density fluid collection in the lower left hemithorax. 2. Volume loss 





 remains in the left hemithorax with consolidation in the left infrahilar 

region 





 3. New small right pleural effusion and consolidation in the right posterior 





 lower lobe 4. The previously noted fluid and debris in the left mainstem 





 bronchus is no longer present.     Ibrahima Mccarthy MD 


 


Chest Ultrasound 10/27/17 0000 Signed





Impressions: 





 Service Date/Time:  Friday, October 27, 2017 09:59 - CONCLUSION:  No focal 





 collections of anechoic free fluid.  Prominent amount of heterogeneous 





 echotexture material surrounding the left lower chest.     Efrain Samuels MD 


 


Chest Tube Insertion 10/26/17 1224 Signed





Impressions: 





 Service Date/Time:  Thursday, October 26, 2017 13:22 - CONCLUSION: 

Uncomplicated 





 CT-guided thoracentesis.     Jorge Carrillo MD 








Objective Remarks


GENERAL: sitting up in bed.


SKIN: large Bruise  noted mid chest, dressing over chest tube site.


EYES: EOMI. 


NECK: Supple, trachea midline.  


CARDIOVASCULAR: Regular rate and rhythm without murmurs 


RESPIRATORY: Breath sounds equal bilaterally. 


GASTROINTESTINAL: Abdomen soft, non-tender, nondistended. 


MUSCULOSKELETAL: No  edema. 


Neuro: Awake and alert


Procedures


CT guided thoracentesis with L chest tube placement 10/26 Dr. Carrillo





A/P


Problem List:  


(1) Pleural effusion on left


ICD Code:  J90 - Pleural effusion, not elsewhere classified


Status:  Acute


(2) Respiratory distress


ICD Code:  R06.00 - Dyspnea, unspecified


Status:  Resolved


(3) Hyponatremia


ICD Code:  E87.1 - Hypo-osmolality and hyponatremia


Status:  Acute


(4) COPD (chronic obstructive pulmonary disease)


ICD Code:  J44.9 - Chronic obstructive pulmonary disease, unspecified


Status:  Chronic


(5) Tobacco abuse


ICD Code:  Z72.0 - Tobacco use


Status:  Chronic


Assessment and Plan


1)Resp Insuff


2)Left-sided consolidation 


3)Exudative pleural effusion.


4)COPD exacerbation.


5)Urinary tract infection.


6)Hx tobacco use


7)Anemia, thrombocytopenia





Plan


Continue with oxygen maintain sats above 92%.


Bronchodilators, decrease solumederol to 40mg daily IV. continue to taper. Pt 

is known to Dr. Grey (outpatient)


s/p CT guided left-sided thoracentesis and CT placement by IR 10/26. Yesterday 

accidentally chest tube came off. Repeat chest x-ray shows no change in the mod 

pleural effusion but does shows some mild patchy opacities in the right lung 

base. Encourage use of IS q1hr while awake.


Exudative effusion. Cytology neg for malignant cells. Fluid cx neg x 72hours.


CT chest 10/26 : Either highly complex fluid or soft tissue mass involving the 

left lung base with mass effect. This measures 12.2 x 12.6 x 7.4 cm.  4.4 cm 

ascending aortic aneurysm.


CTS was consulted for eval for decortication. However pt refused any surgical 

intervention. 


Continue with abx ( Rocephin, Zithromax)


At this time, pt doesn't qualify for rehab or home health PT. Will start her on 

an aggressive PT regime here. Once pt able to stand w assistance of walker and 

hopefully transfer from bed to chair, we may be able to discharge her home. 

Both pt and  agree to this plan. Per pt's request will d/c corbett cath 

tomorrow.


Discharge Planning


aggressive PT at this time.


Pt not safe discharge home. Pt recommending rehab however pt is self pay.





Problem Qualifiers





(1) COPD (chronic obstructive pulmonary disease):  


Qualified Codes:  J43.1 - Panlobular emphysema








Darby Murillo MD Oct 31, 2017 13:57

## 2017-10-31 NOTE — HHI.PR
Subjective


Remarks


Patient is lying in bed in NAD. Afebrile.


Seen by CTS


Pt refused surgical intervention





Objective


Vital Signs





Vital Signs








  Date Time  Temp Pulse Resp B/P (MAP) Pulse Ox O2 Delivery O2 Flow Rate FiO2


 


10/31/17 16:42 97.6 94 20 128/76 (93) 99   


 


10/31/17 12:00 97.6 100 20 136/88 (104) 97   


 


10/31/17 08:01  91      


 


10/31/17 08:00 97.6 93 20 149/84 (105) 95   


 


10/31/17 07:00      Room Air  


 


10/31/17 04:00 97.8 101 20 138/59 (85) 95   


 


10/31/17 04:00      Room Air  


 


10/31/17 00:00 97.7 96 20 145/88 (107) 96   


 


10/31/17 00:00      Room Air  














I/O      


 


 10/30/17 10/30/17 10/30/17 10/31/17 10/31/17 10/31/17





 07:00 15:00 23:00 07:00 15:00 23:00


 


Intake Total 240 ml 480 ml  240 ml 480 ml 


 


Output Total 350 ml 500 ml 12 ml 900 ml  


 


Balance -110 ml -20 ml -12 ml -660 ml 480 ml 


 


      


 


Intake Oral 240 ml 480 ml  240 ml 480 ml 


 


Output Urine Total 350 ml 500 ml  900 ml  


 


Chest Tube Drainage Total   12 ml   








Result Diagram:  


10/31/17 0615                                                                  

              10/31/17 0615





Objective Remarks


GENERAL: Patient is 57 yo lying in bed in NAD


SKIN: Warm and dry.


HEAD: Normocephalic.


EYES: No scleral icterus. No injection or drainage. 


NECK: Supple, trachea midline. No JVD or lymphadenopathy.


CARDIOVASCULAR: Regular rate and rhythm without murmurs, gallops, or rubs. 


RESPIRATORY: Breath sounds equal bilaterally. No accessory muscle use.


GASTROINTESTINAL: Abdomen soft, non-tender, nondistended. 


MUSCULOSKELETAL: No cyanosis, or edema. 


BACK: Nontender without obvious deformity. No CVA tenderness.


Neuro: Awake and alert








A/P


Assessment and Plan


1)Resp Insuff


2)Left-sided consolidation 


3)Exudative pleural effusion.


4)COPD exacerbation.


5)Urinary tract infection.


6)Hx tobacco use


7)Anemia, thrombocytopenia





Plan


Continue with oxygen maintain sats above 92%.


Bronchodilators, decrease solumederol 40mg Q12. 


s/p CT guided left-sided thoracentesis and CT placement by IR 10/26


Exudative effusion. Follow up on cytology


Repeat CT chest 10/29:  Interval placement of left-sided chest tube with slight 

decrease in the complex low-density fluid collection in the lower left 

hemithorax. Volume loss remains in the left hemithorax with consolidation in 

the left infrahilar region. New small right pleural effusion and consolidation 

in the right posterior lower lobe.


CT chest 10/26 : Either highly complex fluid or soft tissue mass involving the 

left lung base with mass effect. This measures 12.2 x 12.6 x 7.4 cm.  4.4 cm 

ascending aortic aneurysm.


Continue with abx ( Rocephin,, Zithromax)


Continue treatment plan.











Bryson Rocha MD Oct 31, 2017 20:17

## 2017-11-01 NOTE — HHI.PR
Subjective


Remarks


Pt very happy to be out of bed. PT worked w her and sat her on chair, she would 

like to remain on chair as much as possible and have dinner. She is very 

motivated and understands that she must do her part in order to get stronger. 

no chest pain, worsening SOB, nausea or vomiting. appetite is good





Objective


Vitals





Vital Signs








  Date Time  Temp Pulse Resp B/P (MAP) Pulse Ox O2 Delivery O2 Flow Rate FiO2


 


11/1/17 12:00 97.4 92 16 113/72 (86) 94   


 


11/1/17 08:10      Room Air  


 


11/1/17 08:10  74      


 


11/1/17 08:00 97.4 83 16 130/78 (95) 96   


 


11/1/17 04:01      Room Air  


 


11/1/17 04:00 97.6 86 18 122/69 (86) 97   


 


11/1/17 00:00      Room Air  


 


11/1/17 00:00 97.6 89 20 127/65 (85) 97   


 


10/31/17 22:51 97.4 95 20 117/72 (87) 98   


 


10/31/17 20:00      Room Air  


 


10/31/17 19:59  91      


 


10/31/17 16:42 97.6 94 20 128/76 (93) 99   














I/O      


 


 10/31/17 10/31/17 10/31/17 11/1/17 11/1/17 11/1/17





 07:00 15:00 23:00 07:00 15:00 23:00


 


Intake Total 240 ml 830 ml   260 ml 


 


Output Total 900 ml   300 ml  


 


Balance -660 ml 830 ml  -300 ml 260 ml 


 


      


 


Intake Oral 240 ml 480 ml    


 


IV Total  350 ml   260 ml 


 


Output Urine Total 900 ml   300 ml  








Result Diagram:  


10/31/17 0615                                                                  

              10/31/17 0615





Imaging





Last Impressions








Chest X-Ray 10/30/17 0000 Signed





Impressions: 





 Service Date/Time:  Monday, October 30, 2017 21:18 - CONCLUSION: No 

significant 





 change in the moderate left effusion. There is mild patchy opacity now noted 

the 





 right lung base.    Ibrahima Mccarthy MD 


 


Chest CT 10/28/17 0000 Signed





Impressions: 





 Service Date/Time:  Saturday, October 28, 2017 14:44 - CONCLUSION:  1. 

Interval 





 placement of left-sided chest tube with slight decrease in the complex 





 low-density fluid collection in the lower left hemithorax. 2. Volume loss 





 remains in the left hemithorax with consolidation in the left infrahilar 

region 





 3. New small right pleural effusion and consolidation in the right posterior 





 lower lobe 4. The previously noted fluid and debris in the left mainstem 





 bronchus is no longer present.     Ibrahima Mccarthy MD 


 


Chest Ultrasound 10/27/17 0000 Signed





Impressions: 





 Service Date/Time:  Friday, October 27, 2017 09:59 - CONCLUSION:  No focal 





 collections of anechoic free fluid.  Prominent amount of heterogeneous 





 echotexture material surrounding the left lower chest.     Efrain Samuels MD 


 


Chest Tube Insertion 10/26/17 1224 Signed





Impressions: 





 Service Date/Time:  Thursday, October 26, 2017 13:22 - CONCLUSION: 

Uncomplicated 





 CT-guided thoracentesis.     Jorge Carrillo MD 








Objective Remarks


GENERAL: sitting up in bed.


SKIN: large Bruise  noted mid chest, dressing in place d/c/i.


EYES: EOMI. 


NECK: Supple, trachea midline.  


CARDIOVASCULAR: Regular rate and rhythm without murmurs 


RESPIRATORY: Breath sounds equal bilaterally. 


GASTROINTESTINAL: Abdomen soft, non-tender, nondistended. 


MUSCULOSKELETAL: No  edema. 


Neuro: Awake and alert


Procedures


CT guided thoracentesis with L chest tube placement 10/26 Dr. Carrillo





A/P


Problem List:  


(1) Pleural effusion on left


ICD Code:  J90 - Pleural effusion, not elsewhere classified


Status:  Acute


(2) Respiratory distress


ICD Code:  R06.00 - Dyspnea, unspecified


Status:  Resolved


(3) Hyponatremia


ICD Code:  E87.1 - Hypo-osmolality and hyponatremia


Status:  Acute


(4) COPD (chronic obstructive pulmonary disease)


ICD Code:  J44.9 - Chronic obstructive pulmonary disease, unspecified


Status:  Chronic


(5) Tobacco abuse


ICD Code:  Z72.0 - Tobacco use


Status:  Chronic


Assessment and Plan


1)Resp Insuff


2)Left-sided consolidation 


3)Exudative pleural effusion.


4)COPD exacerbation.


5)Urinary tract infection.


6)Hx tobacco use


7)Anemia, thrombocytopenia





Plan


Continue with oxygen maintain sats above 92%.


Bronchodilators, on solumederol  40mg daily IV. continue to taper, consider 

switching her to po prednisone in AM. Pt is known to Dr. Grey (outpatient)


s/p CT guided left-sided thoracentesis and CT placement by IR 10/26. Yesterday 

accidentally chest tube came off. Repeat chest x-ray shows no change in the mod 

pleural effusion but does shows some mild patchy opacities in the right lung 

base. Encourage use of IS q1hr while awake.


Exudative effusion. Cytology neg for malignant cells. Fluid cx neg x 72hours.


CT chest 10/26 : Either highly complex fluid or soft tissue mass involving the 

left lung base with mass effect. This measures 12.2 x 12.6 x 7.4 cm.  4.4 cm 

ascending aortic aneurysm.


CTS was consulted for eval for decortication. However pt refused any surgical 

intervention. 


completed 5 days of ( Rocephin, Zithromax)


At this time, pt doesn't qualify for rehab or home health PT. Currently getting 

aggressive PT regimen here. Once pt able to stand w assistance of walker and 

hopefully transfer from bed to chair, we may be able to discharge her home. 

Both pt and  agree to this plan.  corbett cath removed this morning and pt 

able to void w no difficulty


Pt is pleased w her progress.


Discharge Planning


aggressive PT at this time.


Pt not safe discharge home. Pt recommending rehab however pt is self pay. Once 

pt able to stand w assistance of walker and hopefully transfer from bed to chair

, we may be able to discharge her home safely. Both pt and  agree to 

this plan.





Problem Qualifiers





(1) COPD (chronic obstructive pulmonary disease):  


Qualified Codes:  J43.1 - Panlobular emphysema








Darby Murillo MD Nov 1, 2017 14:47

## 2017-11-01 NOTE — HHI.PR
Subjective


Remarks


Patient is lying in bed in NAD. Afebrile.


Seen by CTS


Pt refused surgical intervention


Breathing better





Objective


Vital Signs





Vital Signs








  Date Time  Temp Pulse Resp B/P (MAP) Pulse Ox O2 Delivery O2 Flow Rate FiO2


 


11/1/17 16:32 98.0 90 18 132/87 (102) 98   


 


11/1/17 12:00 97.4 92 16 113/72 (86) 94   


 


11/1/17 08:10      Room Air  


 


11/1/17 08:10  74      


 


11/1/17 08:00 97.4 83 16 130/78 (95) 96   


 


11/1/17 04:01      Room Air  


 


11/1/17 04:00 97.6 86 18 122/69 (86) 97   


 


11/1/17 00:00      Room Air  


 


11/1/17 00:00 97.6 89 20 127/65 (85) 97   


 


10/31/17 22:51 97.4 95 20 117/72 (87) 98   


 


10/31/17 20:00      Room Air  


 


10/31/17 19:59  91      














I/O      


 


 10/31/17 10/31/17 10/31/17 11/1/17 11/1/17 11/1/17





 07:00 15:00 23:00 07:00 15:00 23:00


 


Intake Total 240 ml 830 ml   260 ml 480 ml


 


Output Total 900 ml   300 ml  300 ml


 


Balance -660 ml 830 ml  -300 ml 260 ml 180 ml


 


      


 


Intake Oral 240 ml 480 ml    480 ml


 


IV Total  350 ml   260 ml 


 


Output Urine Total 900 ml   300 ml  300 ml








Result Diagram:  


10/31/17 0615                                                                  

              10/31/17 0615





Objective Remarks


GENERAL: Patient is 55 yo lying in bed in NAD


SKIN: Warm and dry.


HEAD: Normocephalic.


EYES: No scleral icterus. No injection or drainage. 


NECK: Supple, trachea midline. No JVD or lymphadenopathy.


CARDIOVASCULAR: Regular rate and rhythm without murmurs, gallops, or rubs. 


RESPIRATORY: Breath sounds equal bilaterally. No accessory muscle use.


GASTROINTESTINAL: Abdomen soft, non-tender, nondistended. 


MUSCULOSKELETAL: No cyanosis, or edema. 


BACK: Nontender without obvious deformity. No CVA tenderness.


Neuro: Awake and alert








A/P


Assessment and Plan


1)Resp Insuff


2)Left-sided consolidation 


3)Exudative pleural effusion.


4)COPD exacerbation.


5)Urinary tract infection.


6)Hx tobacco use


7)Anemia, thrombocytopenia





Plan


Continue with oxygen maintain sats above 92%.


Bronchodilators, decrease solumederol 40mg Q12. 


s/p CT guided left-sided thoracentesis and CT placement by IR 10/26


Exudative effusion. Follow up on cytology


Repeat CT chest 10/29:  Interval placement of left-sided chest tube with slight 

decrease in the complex low-density fluid collection in the lower left 

hemithorax. Volume loss remains in the left hemithorax with consolidation in 

the left infrahilar region. New small right pleural effusion and consolidation 

in the right posterior lower lobe.


CT chest 10/26 : Either highly complex fluid or soft tissue mass involving the 

left lung base with mass effect. This measures 12.2 x 12.6 x 7.4 cm.  4.4 cm 

ascending aortic aneurysm.


Continue with abx ( Rocephin,, Zithromax)


Continue treatment plan.


Stable on RA


 will FU in AM











Bryson Rocha MD Nov 1, 2017 19:59

## 2017-11-02 NOTE — PD.CONS
HPI


Consult Requested By





Primary Care Physician


NATA Grey MD


History of Present Illness


56-year-old white female with a history of COPD, chronic bronchitis who 

apparently was suffering from food poisoning.  The patient states that she was 

unable to pass urine and became very weak, had lost weight with diarrhea, 

abdominal discomfort, dehydration and presented to ER for evaluation. She c/o 

20 lb weight loss over the past 4 months. Chest CT done showed evidence of a 

loculated  mass-like infiltrate in the left lung base 12 x 12 cm and a left 

upper lobe lung nodule 7 mm.  The patient was started on Solu-Medrol, IV 

antibiotics including Rocephin and Zithromax.  Denies chest pains or abdominal 

pains or nausea or vomiting. She underwent chest tube placement in IR with 

minimal drainage of this effusion.  I was consulted to evaluate her for CHF, AS

, AI. TTE shows preserved EF and at least moderate AS/AI.





Review of Systems


Consitutional:  DENIES: Fatigue, Fever, Chills, Weight gain, Weight loss


Eyes:  DENIES: Amaurosis Fugax, Change in vision


HEENT:  DENIES: Lightheadedness, Change in hearing


Respiratory:  DENIES: See HPI, Cough, Snoring, Shortness of breath, Wheezing, 

Sputum production


Cardiovascular:  DENIES: See HPI, Chest pain, Palpitations, Syncope, Tachycardia


Gastrointestinal:  COMPLAINS OF: Nausea, Vomiting, DENIES: Change in bowel 

habits, Reflux, Bloody stools, Melena


Genitourinary:  DENIES: Urinary incontinence, Difficulty voiding


Integumentary:  DENIES: Rash


Neurologic:  DENIES: Tingling or numbness, Memory problems, Poor Balance, 

Stroke symptoms


Musculoskeletal:  DENIES: Joint pain, Muscle pain, Limited range of motion, 

Back pain


Psychiatric:  DENIES: Anxiety, Depression, Sleep disturbances


Hematologic:  DENIES: Bruising tendencies, Bleeding tendencies


Endocrine:  COMPLAINS OF: Weight loss, DENIES: Weight gain, Thyroid disease





Past Family Social History


Allergies:  


Coded Allergies:  


     No Known Allergies (Verified , 10/26/17)


Past Medical History





1.  History of COPD.


2.  She has had severe anemia for many years.


3.  Fluid retention and chronic leg edema in the past


4.  Chronic pleural effusions which required thoracentesis in the past.


5.  The patient has had no other significant surgical history.  


6.  She was mildly hypertensive in the past.


Reported Medications





Reported Meds & Active Scripts


Active


Reported


Furosemide 20 Mg Tab 20 Mg PO DAILY


Active Ordered Medications





Current Medications








 Medications


  (Trade)  Dose


 Ordered  Sig/Emre


 Route  Start Time


 Stop Time Status Last Admin


 


  (NS Flush)  2 ml  UNSCH  PRN


 IVF  10/26/17 10:15


    11/2/17 08:15


 


 


  (Duoneb Neb)  1 ampule  Q2HR NEB  PRN


 INH  10/26/17 11:30


    11/1/17 10:14


 


 


 Miscellaneous


 Information  1  Q361D


 XX  10/26/17 11:30


    10/26/17 21:22


 


 


  (Chlorhexidine


 2% Cloth)  


 Taper  DAILY@04


 TOP  10/27/17 04:00


 10/23/18 03:59  10/29/17 04:00


 


 


  (Chlorhexidine


 2% Cloth)  3 pack  UNSCH  PRN


 TOP  10/26/17 11:30


     


 


 


  (Jess-Colace)  1 tab  BID


 PO  10/26/17 21:00


    11/2/17 08:15


 


 


  (Milk Of


 Magnesia Liq)  30 ml  Q12H  PRN


 PO  10/26/17 11:30


     


 


 


  (Senokot)  17.2 mg  Q12H  PRN


 PO  10/26/17 11:30


     


 


 


  (Dulcolax Supp)  10 mg  DAILY  PRN


 RECTAL  10/26/17 11:30


     


 


 


  (Lactulose Liq)  30 ml  DAILY  PRN


 PO  10/26/17 11:30


     


 


 


  (D50w (Vial) Inj)  50 ml  UNSCH  PRN


 IV PUSH  10/26/17 11:30


     


 


 


  (Glucagon Inj)  1 mg  UNSCH  PRN


 OTHER  10/26/17 11:30


     


 


 


  (NovoLIN R


 SUPPLEMENTAL


 SCALE)  1  Q6H


 SQ  10/26/17 11:30


    10/29/17 17:30


 


 


  (Tylenol)  650 mg  Q6H  PRN


 PO  10/27/17 22:30


     


 


 


  (Morphine Inj)  2 mg  Q4H  PRN


 IV  10/28/17 14:30


    11/2/17 17:50


 


 


  (Norco  5-325 Mg)  1 tab  Q6H  PRN


 PO  10/28/17 13:45


    10/30/17 05:18


 


 


  (Norco  5-325 Mg)  2 tab  Q6H  PRN


 PO  10/28/17 13:45


    11/2/17 13:57


 


 


  (Protonix)  40 mg  DAILY


 PO  10/29/17 09:00


    11/2/17 08:15


 


 


  (Procardia Xl)  30 mg  DAILY


 PO  11/3/17 09:00


     


 


 


  (Lasix)  20 mg  DAILY


 PO  11/2/17 17:35


    11/2/17 17:50


 








Family History


 


Father had a history of lung cancer.


Social History





Tobacco abuse





Physical Exam


Vital Signs





Vital Signs








  Date Time  Temp Pulse Resp B/P (MAP) Pulse Ox O2 Delivery O2 Flow Rate FiO2


 


11/2/17 16:00 97.6 89 18 163/98 (119) 97   


 


11/2/17 11:44 97.2 101 20 159/97 (117) 98   


 


11/2/17 08:00 97.8 98 20 145/86 (105) 97   


 


11/2/17 08:00  89      


 


11/2/17 07:43      Room Air  


 


11/2/17 04:00 97.5 86 16 124/64 (84) 96   


 


11/2/17 00:00 97.4 92 16 123/59 (80) 98   


 


11/2/17 00:00      Room Air  


 


11/1/17 20:00  84      


 


11/1/17 20:00      Room Air  


 


11/1/17 19:33 97.6 90 20 142/81 (101) 96   








Laboratory











 Date/Time


Source Procedure


Growth Status


 


 


 10/26/17 10:10


Blood Peripheral Aerobic Blood Culture - Final


NO GROWTH IN 5 DAYS Complete


 


 10/26/17 10:10


Blood Peripheral Anaerobic Blood Culture - Final


NO GROWTH IN 5 DAYS Complete





 10/26/17 14:00


Fluid Pleural Fluid Gram Stain - Final Complete


 


 10/26/17 14:00


Fluid Pleural Fluid Body Fluid Culture - Final


NO GROWTH IN 72 HRS.--AEROBICALLY OR ... Complete


 


 10/26/17 10:10


Nasal Aspirate Influenza Types A,B Antigen (SHAN) - Final


NEGATIVE FOR FLU A AND B ANTIGEN.... Complete


 


 10/26/17 10:05


Urine Clean Catch Urine Culture - Final


Klebsiella Pneumoniae Complete








Result Diagram:  


10/31/17 0615                                                                  

              10/31/17 0615





Imaging





Last Impressions








Chest X-Ray 10/30/17 0000 Signed





Impressions: 





 Service Date/Time:  Monday, October 30, 2017 21:18 - CONCLUSION: No 

significant 





 change in the moderate left effusion. There is mild patchy opacity now noted 

the 





 right lung base.    Ibrahima Mccarthy MD 


 


Chest CT 10/28/17 0000 Signed





Impressions: 





 Service Date/Time:  Saturday, October 28, 2017 14:44 - CONCLUSION:  1. 

Interval 





 placement of left-sided chest tube with slight decrease in the complex 





 low-density fluid collection in the lower left hemithorax. 2. Volume loss 





 remains in the left hemithorax with consolidation in the left infrahilar 

region 





 3. New small right pleural effusion and consolidation in the right posterior 





 lower lobe 4. The previously noted fluid and debris in the left mainstem 





 bronchus is no longer present.     Ibrahima Mccarthy MD 


 


Chest Ultrasound 10/27/17 0000 Signed





Impressions: 





 Service Date/Time:  Friday, October 27, 2017 09:59 - CONCLUSION:  No focal 





 collections of anechoic free fluid.  Prominent amount of heterogeneous 





 echotexture material surrounding the left lower chest.     Efrain Samuels MD 


 


Chest Tube Insertion 10/26/17 1224 Signed





Impressions: 





 Service Date/Time:  Thursday, October 26, 2017 13:22 - CONCLUSION: 

Uncomplicated 





 CT-guided thoracentesis.     Jorge Carrillo MD 











Assessment and Plan


Problem List:  


(1) COPD (chronic obstructive pulmonary disease)


ICD Codes:  J44.9 - Chronic obstructive pulmonary disease, unspecified


Status:  Chronic


Plan:   


IMPRESSION


1.  COPD with acute exacerbation


2.  Chronic left basilar effusion with loculation


3.  Atelectasis left lower lobe with mucus plugging.


4.  Hyponatremia.


5.  Abnormal liver enzymes


6.  Anemia and chronic disease.


7. Aortic Stenosis/AI


8. Soft tissue mass involving the left lung base with mass effect.  


9. 4.4 cm ascending aortic aneurysm.


10. Deconditioned





Patient refuses, any time of work up for AS/AI at this time. EF preserved. Thus 

continue medical management with diuresis , daily weight, low salt diet, BP 

control and aggressive medical management for CAD. Once patient decides to 

undergo further evaluation she can follow with Cardiology as an outpatient. 





Thank you for the opportunity to take part in the car of this patient. 





Will be available  on a PRN basis for any questions or concerns





(2) Respiratory distress


ICD Codes:  R06.00 - Dyspnea, unspecified


Status:  Resolved


(3) Tobacco abuse


ICD Codes:  Z72.0 - Tobacco use


Status:  Chronic


(4) Physical deconditioning


ICD Codes:  R53.81 - Other malaise


Status:  Chronic


(5) Pleural effusion on left


ICD Codes:  J90 - Pleural effusion, not elsewhere classified


Status:  Acute


(6) Hyponatremia


ICD Codes:  E87.1 - Hypo-osmolality and hyponatremia


Status:  Acute


(7) Hypochloremia


ICD Codes:  E87.8 - Other disorders of electrolyte and fluid balance, not 

elsewhere classified


Status:  Acute





Problem Qualifiers





(1) COPD (chronic obstructive pulmonary disease):  


Qualified Codes:  J43.1 - Panlobular emphysema








Claude Tillman MD Nov 2, 2017 19:11

## 2017-11-02 NOTE — HHI.PR
Subjective


Remarks





Patient says she is still tired today, reports shortness of breath is about the 

same.  Denies any cough.  Denies any nausea or vomiting.  Denies constipation.  

Denies any chest pain





Objective





Vital Signs








  Date Time  Temp Pulse Resp B/P (MAP) Pulse Ox O2 Delivery O2 Flow Rate FiO2


 


11/2/17 11:44 97.2 101 20 159/97 (117) 98   


 


11/2/17 08:00 97.8 98 20 145/86 (105) 97   


 


11/2/17 08:00  89      


 


11/2/17 07:43      Room Air  


 


11/2/17 04:00 97.5 86 16 124/64 (84) 96   


 


11/2/17 00:00 97.4 92 16 123/59 (80) 98   


 


11/2/17 00:00      Room Air  


 


11/1/17 20:00  84      


 


11/1/17 20:00      Room Air  


 


11/1/17 19:33 97.6 90 20 142/81 (101) 96   


 


11/1/17 16:32 98.0 90 18 132/87 (102) 98   














I/O      


 


 11/1/17 11/1/17 11/1/17 11/2/17 11/2/17 11/2/17





 07:00 15:00 23:00 07:00 15:00 23:00


 


Intake Total  260 ml 480 ml 680 ml  


 


Output Total 300 ml  300 ml   


 


Balance -300 ml 260 ml 180 ml 680 ml  


 


      


 


Intake Oral   480 ml 680 ml  


 


IV Total  260 ml    


 


Output Urine Total 300 ml  300 ml   


 


# Voids    1  








Result Diagram:  


10/31/17 0615                                                                  

              10/31/17 0615





Objective Remarks


GENERAL: Patient sitting up in bed.  Appears comfortable.  Alert and oriented 3


SKIN: Warm and dry.


HEAD: Normocephalic.


EYES: No scleral icterus. No injection or drainage. 


NECK: Supple, trachea midline. No JVD.


CARDIOVASCULAR: Regular rate and rhythm without murmurs, gallops, or rubs. 


RESPIRATORY: Breath sounds equal bilaterally. No accessory muscle use.


GASTROINTESTINAL: Abdomen soft, non-tender, nondistended. 


MUSCULOSKELETAL: No cyanosis, or edema. 


BACK: Nontender without obvious deformity. No CVA tenderness.








A/P


Assessment and Plan


//Resp Insuff


//Left-sided consolidation 


//Exudative pleural effusion.


//COPD exacerbation.


//Urinary tract infection.


//Hx tobacco use


//Anemia, thrombocytopenia


//Moderate aortic valve regurgitation


//Moderate calcific aortic valve stenosis


//With resultant suspected pulmonary hypertension with pulmonary arterial 

pressure 69.3











Plan


Continue with oxygen maintain sats above 92%.


Bronchodilators, on solumederol  40mg daily IV. continue to taper, consider 

switching her to po prednisone in AM. Pt is known to Dr. Grey (outpatient)


s/p CT guided left-sided thoracentesis and CT placement by IR 10/26. Yesterday 

accidentally chest tube came off. Repeat chest x-ray shows no change in the mod 

pleural effusion but does shows some mild patchy opacities in the right lung 

base. Encourage use of IS q1hr while awake.


Exudative effusion. Cytology neg for malignant cells. Fluid cx neg x 72hours.


CT chest 10/26 : Either highly complex fluid or soft tissue mass involving the 

left lung base with mass effect. This measures 12.2 x 12.6 x 7.4 cm.  4.4 cm 

ascending aortic aneurysm.


CTS was consulted for eval for decortication. However pt refused any surgical 

intervention. 


completed 5 days of ( Rocephin, Zithromax)


At this time, pt doesn't qualify for rehab or home health PT. Currently getting 

aggressive PT regimen here. Once pt able to stand w assistance of walker and 

hopefully transfer from bed to chair, we may be able to discharge her home. 

Both pt and  agree to this plan.  corbett cath removed this morning and pt 

able to void w no difficulty


Pt is pleased w her progress.





11/2 patient seen and examined.  Still with tiredness, shortness of breath.  

Reviewed echocardiogram


At problems below.


//Moderate aortic valve regurgitation


//Moderate calcific aortic valve stenosis


//With resultant suspected pulmonary hypertension with pulmonary arterial 

pressure 69.3


-Reviewed echocardiogram from 10/27.  Says ejection fraction within normal 

limits, however expect calculation skewed by regurgitation


-Start on afterload reduction with nifedipine.  Start back on Lasix.  Consult 

cardiology.


-Continue treatment for COPD exacerbation as above.


Discharge Planning


Start treatment for CHF exacerbation, aortic insufficiency


Pending cardiology evaluation.-


Continued weakness.  Continue to work with PT.  Patient is self pay.  Hopefully 

if further strengthening, can go home with .  Appreciate physical 

therapy and case management assistance.











Chad Ibarra MD Nov 2, 2017 16:32

## 2017-11-03 NOTE — HHI.PR
Subjective


Remarks


Pain  along  left  chest wall. Mild  SOB .


Chest tube  is  out. She has  bruising  all over.


Has  a foot problem  and is  unable to  walk.





Objective





Vital Signs








  Date Time  Temp Pulse Resp B/P (MAP) Pulse Ox O2 Delivery O2 Flow Rate FiO2


 


11/3/17 12:35      Room Air  21


 


11/3/17 11:48 97.5 88 16 114/64 (81) 98   


 


11/3/17 08:00 97.4 94 17 117/77 (90) 95   


 


11/3/17 07:51  83      


 


11/3/17 07:20   19     


 


11/3/17 05:20   17     


 


11/3/17 04:00 97.5 108 22 121/78 (92) 94   


 


11/3/17 04:00      Room Air  


 


11/3/17 00:00      Room Air  


 


11/3/17 00:00 97.9 92 19 129/72 (91) 95   


 


11/2/17 21:33  85      


 


11/2/17 20:30      Room Air  


 


11/2/17 20:00 98.9 91 18 155/79 (104) 100   














I/O      


 


 11/2/17 11/2/17 11/2/17 11/3/17 11/3/17 11/3/17





 07:00 15:00 23:00 07:00 15:00 23:00


 


Intake Total 680 ml  720 ml 620 ml  


 


Balance 680 ml  720 ml 620 ml  


 


      


 


Intake Oral 680 ml  720 ml 620 ml  


 


# Voids 1  3 2  


 


# Bowel Movements   1 0  








Result Diagram:  


11/3/17 0732                                                                   

             11/3/17 0732





Objective Remarks


GENERAL:  This is an averagely built middle-aged white female who is pale and


not dyspneic.


HEENT:  Head normocephalic.  Pupils are reactive and equal.  Tongue moist.


Throat is clear.  Nasal mucosa dry.


NECK:  Supple.  No bruits, thyroid enlargement or lymphadenopathy.


CHEST:  Distant breath sounds at the lung bases with occasional crackles in the


left upper lung field.


HEART:  The heart sounds are irregular, S1-S2.  No murmur.  No S3.


ABDOMEN:  Soft and nontender.  No organomegaly.  Bowel sounds are active.


EXTREMITIES:  Edema 1+ with diminished peripheral pulses.  NEUROLOGIC:


Reflexes are 1+ with no gross motor deficits.  


RECTAL:   Exam is deferred.


SKIN: Dry and scaly.





Assessment and Plan


Assessment and Plan





 


IMPRESSION


1.  COPD with acute exacerbation


2.  Chronic left basilar effusion with loculation


3.  Atelectasis left lower lobe with mucus plugging.


4.  Hyponatremia.


5.  Abnormal liver enzymes


6.  Anemia and chronic disease.











Plan :








1. O2  2 L. prn





2. PFT at Bedside





3. Add prednisone 10 mg daily.





4. Cardiology  to  see





5. CBC,BMP.





6. May need  CT Guided  needle  biopsy 





7. May need  surgical evaluation  for  decortication.











NATA Grey MD Nov 3, 2017 17:57

## 2017-11-03 NOTE — RADRPT
EXAM DATE/TIME:  11/03/2017 13:06 

 

[facility] COMPARISON:     

CHEST SINGLE AP, October 30, 2017, 21:18.

 

                     

INDICATIONS :     

Shortness of breath.

                     

 

MEDICAL HISTORY :     

Chronic obstructive pulmonary disease.          

 

SURGICAL HISTORY :        

Chest tube, left.

 

ENCOUNTER:     

Subsequent                                        

 

ACUITY:     

1 week      

 

PAIN SCORE:     

3/10

 

LOCATION:     

Bilateral chest 

 

FINDINGS:     

Moderate left pleural effusion occupying one half of the left hemithorax.  Minimal parenchymal change
s right base.  The heart is enlarged.

 

CONCLUSION:     Moderate to large left pleural effusion occupying one half of the left hemithorax.  

 

 

 Roland Osawld MD FACR on November 03, 2017 at 13:33           

Board Certified Radiologist.

 This report was verified electronically.

## 2017-11-03 NOTE — HHI.PR
Subjective


Remarks


he bed, appears sleepy. Patient says she did work with PT yesterday, thinks is 

improving but not much. With sob and feeling tired. No much cough. No fever or 

chills. No n/v/d/c.





Objective


Vitals





Vital Signs








  Date Time  Temp Pulse Resp B/P (MAP) Pulse Ox O2 Delivery O2 Flow Rate FiO2


 


11/3/17 08:00 97.4 94 17 117/77 (90) 95   


 


11/3/17 07:20   19     


 


11/3/17 05:20   17     


 


11/3/17 04:00 97.5 108 22 121/78 (92) 94   


 


11/3/17 04:00      Room Air  


 


11/3/17 00:00      Room Air  


 


11/3/17 00:00 97.9 92 19 129/72 (91) 95   


 


11/2/17 21:33  85      


 


11/2/17 20:30      Room Air  


 


11/2/17 20:00 98.9 91 18 155/79 (104) 100   


 


11/2/17 16:00 97.6 89 18 163/98 (119) 97   


 


11/2/17 11:44 97.2 101 20 159/97 (117) 98   














I/O      


 


 11/2/17 11/2/17 11/2/17 11/3/17 11/3/17 11/3/17





 07:00 15:00 23:00 07:00 15:00 23:00


 


Intake Total 680 ml  720 ml 620 ml  


 


Balance 680 ml  720 ml 620 ml  


 


      


 


Intake Oral 680 ml  720 ml 620 ml  


 


# Voids 1  3 2  


 


# Bowel Movements   1 0  








Result Diagram:  


11/3/17 0732                                                                   

             11/3/17 0732





Objective Remarks


GENERAL: Patient sitting up in bed.  Appears comfortable.  Alert and oriented 3


NECK: Supple, trachea midline. No JVD.


CARDIOVASCULAR: Regular rate and rhythm without murmurs, gallops, or rubs. 


RESPIRATORY: Breath sounds equal bilaterally. No accessory muscle use.


GASTROINTESTINAL: Abdomen soft, non-tender, nondistended. 


MUSCULOSKELETAL: No cyanosis, or edema. 


BACK: Nontender without obvious deformity. No CVA tenderness.








Procedures


CT guided thoracentesis with L chest tube placement 10/26 Dr. Carrillo





A/P


Problem List:  


(1) Pleural effusion on left


ICD Code:  J90 - Pleural effusion, not elsewhere classified


Status:  Acute


(2) Respiratory distress


ICD Code:  R06.00 - Dyspnea, unspecified


Status:  Resolved


(3) Hyponatremia


ICD Code:  E87.1 - Hypo-osmolality and hyponatremia


Status:  Acute


(4) COPD (chronic obstructive pulmonary disease)


ICD Code:  J44.9 - Chronic obstructive pulmonary disease, unspecified


Status:  Chronic


(5) Tobacco abuse


ICD Code:  Z72.0 - Tobacco use


Status:  Chronic


Assessment and Plan


Resp Insuff


Left-sided consolidation 


Exudative pleural effusion.


COPD exacerbation.


Urinary tract infection.


Hx tobacco use


Anemia, thrombocytopenia


Moderate aortic valve regurgitation


Moderate calcific aortic valve stenosis


With resultant suspected pulmonary hypertension with pulmonary arterial 

pressure 69.3











Plan


Continue with oxygen maintain sats above 92%.


Bronchodilators, on solumederol  40mg daily IV. continue to taper, consider 

switching her to po prednisone in AM. Pt is known to Dr. Grey (outpatient)


s/p CT guided left-sided thoracentesis and CT placement by IR 10/26. Yesterday 

accidentally chest tube came off. Repeat chest x-ray shows no change in the mod 

pleural effusion but does shows some mild patchy opacities in the right lung 

base. Encourage use of IS q1hr while awake.


Exudative effusion. Cytology neg for malignant cells. Fluid cx neg x 72hours.


CT chest 10/26 : Either highly complex fluid or soft tissue mass involving the 

left lung base with mass effect. This measures 12.2 x 12.6 x 7.4 cm.  4.4 cm 

ascending aortic aneurysm.


CTS was consulted for eval for decortication. However pt refused any surgical 

intervention. 


completed 5 days of ( Rocephin, Zithromax)


At this time, pt doesn't qualify for rehab or home health PT. Currently getting 

aggressive PT regimen here. Once pt able to stand w assistance of walker and 

hopefully transfer from bed to chair, we may be able to discharge her home. 

Both pt and  agree to this plan.  corbett cath removed this morning and pt 

able to void w no difficulty


Pt is pleased w her progress.


-Echocardiogram from 10/27.  Says ejection fraction within normal limits, 

however expect calculation skewed by regurgitation


-Started on afterload reduction with nifedipine.  Start back on Lasix.  Consult 

cardiology.


-Continue treatment for COPD exacerbation as above.Seen by cardiology Dr Lowery. 

Patient refuses, any time of work up for AS/AI at this time. EF preserved. Thus 

continue medical management with diuresis , daily weight, low salt diet, BP 

control and aggressive medical management for CAD. Once patient decides to 

undergo further evaluation she can follow with Cardiology as an outpatient. 





Discharge Planning


Started treatment for CHF exacerbation, aortic insufficiency


Cardiology evaluation.


Continued weakness.  Continue to work with PT.  Patient is self pay.  Hopefully 

if further strengthening, can go home with .  Appreciate physical 

therapy and case management assistance.





Problem Qualifiers





(1) COPD (chronic obstructive pulmonary disease):  


Qualified Codes:  J43.1 - Panlobular emphysema








Rose Mary Meléndez MD Nov 3, 2017 09:43

## 2017-11-04 NOTE — HHI.PR
Subjective


Remarks


Pain  along  left  chest wall. Mild  SOB .


Chest tube  is  out. Advised  her  again  about  doing  a VATS thoracotomy and  

decortication.


Has  a foot problem  and is  unable to  walk.





Objective





Vital Signs








  Date Time  Temp Pulse Resp B/P (MAP) Pulse Ox O2 Delivery O2 Flow Rate FiO2


 


11/4/17 12:09 98.0 98 20 118/73 (88) 98   


 


11/4/17 10:55  90      


 


11/4/17 09:03 97.8 95 18 131/78 (95) 99   


 


11/4/17 07:20      Room Air  


 


11/4/17 06:30   18     


 


11/4/17 04:00 97.9 88 17 112/68 (83) 94   


 


11/4/17 00:00 97.8 92 18 110/63 (79) 93   


 


11/3/17 21:53   17     


 


11/3/17 20:43  84      


 


11/3/17 20:00 97.4 88 18 116/69 (85) 95   


 


11/3/17 20:00      Room Air  


 


11/3/17 16:00 97.6 85 18 127/75 (92) 96   














I/O      


 


 11/3/17 11/3/17 11/3/17 11/4/17 11/4/17 11/4/17





 07:00 15:00 23:00 07:00 15:00 23:00


 


Intake Total 620 ml  720 ml 480 ml  


 


Balance 620 ml  720 ml 480 ml  


 


      


 


Intake Oral 620 ml  720 ml 480 ml  


 


# Voids 2  4 1  


 


# Bowel Movements 0  0 1  








Result Diagram:  


11/3/17 0732                                                                   

             11/3/17 0732





Objective Remarks


GENERAL:  This is an averagely built middle-aged white female who is pale and


not dyspneic.


HEENT:  Head normocephalic.  Pupils are reactive and equal.  Tongue moist.


Throat is clear.  Nasal mucosa dry.


NECK:  Supple.  No bruits, thyroid enlargement or lymphadenopathy.


CHEST:  Distant breath sounds at the left  lung base, with occasional crackles 

in the


left upper lung field.


HEART:  The heart sounds are irregular, S1-S2.  No murmur.  No S3.


ABDOMEN:  Soft and nontender.  No organomegaly.  Bowel sounds are active.


EXTREMITIES:  Edema 1+ with diminished peripheral pulses.Foot  deformity.  

NEUROLOGIC:


Reflexes are 1+ with no gross motor deficits.  


RECTAL:   Exam is deferred.


SKIN: Dry and scaly.





Assessment and Plan


Assessment and Plan





 


IMPRESSION


1.  COPD with acute exacerbation


2.  Chronic left basilar effusion with loculation


3.  Atelectasis left lower lobe with mucus plugging.


4.  Hyponatremia.


5.  Abnormal liver enzymes


6.  Anemia and chronic disease.











Plan :








1. O2  2 L. prn





2. PFT at Bedside





3. Prednisone 10 mg daily.





4. Will  ask CT surgery to  see her  again





5. CBC,BMP.





6. PT evaluation  for   ambulation.





7. Continue  Lasix  20 mg daily.











NATA Grey MD Nov 4, 2017 13:54

## 2017-11-04 NOTE — HHI.PR
Subjective


Remarks


In bed with some sob and has palpitations,  however she says she did her 

physical therapy and just finished. Pain is fairly controlled by meds. No n/v/d/

c. No fever or chills.





Objective


Vitals





Vital Signs








  Date Time  Temp Pulse Resp B/P (MAP) Pulse Ox O2 Delivery O2 Flow Rate FiO2


 


11/4/17 12:09 98.0 98 20 118/73 (88) 98   


 


11/4/17 10:55  90      


 


11/4/17 09:03 97.8 95 18 131/78 (95) 99   


 


11/4/17 07:20      Room Air  


 


11/4/17 06:30   18     


 


11/4/17 04:00 97.9 88 17 112/68 (83) 94   


 


11/4/17 00:00 97.8 92 18 110/63 (79) 93   


 


11/3/17 21:53   17     


 


11/3/17 20:43  84      


 


11/3/17 20:00 97.4 88 18 116/69 (85) 95   


 


11/3/17 20:00      Room Air  


 


11/3/17 16:00 97.6 85 18 127/75 (92) 96   














I/O      


 


 11/3/17 11/3/17 11/3/17 11/4/17 11/4/17 11/4/17





 07:00 15:00 23:00 07:00 15:00 23:00


 


Intake Total 620 ml  720 ml 480 ml  


 


Balance 620 ml  720 ml 480 ml  


 


      


 


Intake Oral 620 ml  720 ml 480 ml  


 


# Voids 2  4 1  


 


# Bowel Movements 0  0 1  








Result Diagram:  


11/3/17 0732                                                                   

             11/3/17 0732





Imaging





Last Impressions








Chest X-Ray 11/3/17 0000 Signed





Impressions: 





 Service Date/Time:  Friday, November 3, 2017 13:06 - CONCLUSION: Moderate to 





 large left pleural effusion occupying one half of the left hemithorax.      





 Roland Oswald MD  FACR


 


Chest CT 10/28/17 0000 Signed





Impressions: 





 Service Date/Time:  Saturday, October 28, 2017 14:44 - CONCLUSION:  1. 

Interval 





 placement of left-sided chest tube with slight decrease in the complex 





 low-density fluid collection in the lower left hemithorax. 2. Volume loss 





 remains in the left hemithorax with consolidation in the left infrahilar 

region 





 3. New small right pleural effusion and consolidation in the right posterior 





 lower lobe 4. The previously noted fluid and debris in the left mainstem 





 bronchus is no longer present.     Ibrahima Mccarthy MD 


 


Chest Ultrasound 10/27/17 0000 Signed





Impressions: 





 Service Date/Time:  Friday, October 27, 2017 09:59 - CONCLUSION:  No focal 





 collections of anechoic free fluid.  Prominent amount of heterogeneous 





 echotexture material surrounding the left lower chest.     Efrain Samuels MD 


 


Chest Tube Insertion 10/26/17 1224 Signed





Impressions: 





 Service Date/Time:  Thursday, October 26, 2017 13:22 - CONCLUSION: 

Uncomplicated 





 CT-guided thoracentesis.     Jorge Carrillo MD 








Objective Remarks


GENERAL: Patient sitting up in bed.  Appears comfortable.  Alert and oriented 3


NECK: Supple, trachea midline. No JVD.


CARDIOVASCULAR: Regular rate and rhythm without murmurs, gallops, or rubs. 


RESPIRATORY: Breath sounds equal bilaterally. No accessory muscle use.


GASTROINTESTINAL: Abdomen soft, non-tender, nondistended. 


MUSCULOSKELETAL: No cyanosis, or edema. 


BACK: Nontender without obvious deformity. No CVA tenderness.








Procedures


CT guided thoracentesis with L chest tube placement 10/26 Dr. Carrillo





A/P


Problem List:  


(1) Pleural effusion on left


ICD Code:  J90 - Pleural effusion, not elsewhere classified


Status:  Acute


(2) Respiratory distress


ICD Code:  R06.00 - Dyspnea, unspecified


Status:  Resolved


(3) Hyponatremia


ICD Code:  E87.1 - Hypo-osmolality and hyponatremia


Status:  Acute


(4) COPD (chronic obstructive pulmonary disease)


ICD Code:  J44.9 - Chronic obstructive pulmonary disease, unspecified


Status:  Chronic


(5) Tobacco abuse


ICD Code:  Z72.0 - Tobacco use


Status:  Chronic


Assessment and Plan


Respiratory Insufficiency


Left-sided consolidation 


Exudative pleural effusion.


COPD exacerbation.


Urinary tract infection.


Hx tobacco use


Anemia, thrombocytopenia


Moderate aortic valve regurgitation


Moderate calcific aortic valve stenosis


With resultant suspected pulmonary hypertension with pulmonary arterial 

pressure 69.3











Plan


Continue with oxygen maintain sats above 92%.


Bronchodilators, on solumederol  40mg daily IV. continue to taper, consider 

switching her to po prednisone in AM. Pt is known to Dr. Grey (outpatient)


s/p CT guided left-sided thoracentesis and CT placement by IR 10/26. Yesterday 

accidentally chest tube came off. Repeat chest x-ray shows no change in the mod 

pleural effusion but does shows some mild patchy opacities in the right lung 

base. Encourage use of IS q1hr while awake.


Exudative effusion. Cytology neg for malignant cells. Fluid cx neg x 72hours.


CT chest 10/26 : Either highly complex fluid or soft tissue mass involving the 

left lung base with mass effect. This measures 12.2 x 12.6 x 7.4 cm.  4.4 cm 

ascending aortic aneurysm.


CTS was consulted for eval for decortication. However pt refused any surgical 

intervention. 


completed 5 days of ( Rocephin, Zithromax)


At this time, pt doesn't qualify for rehab or home health PT. Currently getting 

aggressive PT regimen here. Once pt able to stand w assistance of walker and 

hopefully transfer from bed to chair, we may be able to discharge her home. 

Both pt and  agree to this plan.  corbett cath removed this morning and pt 

able to void w no difficulty


Pt is pleased w her progress.


-Echocardiogram from 10/27.  Says ejection fraction within normal limits, 

however expect calculation skewed by regurgitation


-Started on afterload reduction with nifedipine.  Start back on Lasix.  Consult 

cardiology.


-Continue treatment for COPD exacerbation as above.Seen by cardiology Dr Lowery. 

Patient refuses, any time of work up for AS/AI at this time. EF preserved. Thus 

continue medical management with diuresis , daily weight, low salt diet, BP 

control and aggressive medical management for CAD. Once patient decides to 

undergo further evaluation she can follow with Cardiology as an outpatient. 





Discharge Planning


Started treatment for CHF exacerbation, aortic insufficiency


Cardiology evaluation.


Continued weakness.  Continue to work with PT.  Patient is self pay.  Hopefully 

if further strengthening, can go home with .  Appreciate physical 

therapy and case management assistance.





Problem Qualifiers





(1) COPD (chronic obstructive pulmonary disease):  


Qualified Codes:  J43.1 - Panlobular emphysema








Rose Mary Meléndez MD Nov 4, 2017 12:58

## 2017-11-05 NOTE — HHI.PR
Subjective


Remarks


Patient in bed, says he was standing with PT for 2 minutes yesterday, she is 

improving slowly,. Says she will see Dr Wallace tomorrow,. she is thinking 

regarding procedure and today will have family reunion.





Objective


Vitals





Vital Signs








  Date Time  Temp Pulse Resp B/P (MAP) Pulse Ox O2 Delivery O2 Flow Rate FiO2


 


11/5/17 08:19   18     


 


11/5/17 08:00 97.5 98 18 136/76 (96) 97   


 


11/5/17 04:00 97.5 89 18 125/68 (87) 95   


 


11/5/17 00:00 98.2 87 18 128/72 (90) 95   


 


11/4/17 20:00 97.4 92 18 126/71 (89) 100   


 


11/4/17 20:00      Room Air  21


 


11/4/17 19:59  88      


 


11/4/17 18:40 98.0 100 18 125/70 (88) 94   


 


11/4/17 12:09 98.0 98 20 118/73 (88) 98   


 


11/4/17 10:55  90      














I/O      


 


 11/4/17 11/4/17 11/4/17 11/5/17 11/5/17 11/5/17





 07:00 15:00 23:00 07:00 15:00 23:00


 


Intake Total 480 ml  480 ml   


 


Balance 480 ml  480 ml   


 


      


 


Intake Oral 480 ml  480 ml   


 


# Voids 1  2   


 


# Bowel Movements 1     








Result Diagram:  


11/3/17 0732                                                                   

             11/3/17 0732





Objective Remarks


GENERAL: Patient sitting up in bed.  Appears comfortable.  Alert and oriented 3


NECK: Supple, trachea midline. No JVD.


CARDIOVASCULAR: Regular rate and rhythm without murmurs, gallops, or rubs. 


RESPIRATORY: Breath sounds equal bilaterally. No accessory muscle use.


GASTROINTESTINAL: Abdomen soft, non-tender, nondistended. 


MUSCULOSKELETAL: No cyanosis, or edema. 


BACK: Nontender without obvious deformity. No CVA tenderness.








Procedures


CT guided thoracentesis with L chest tube placement 10/26 Dr. Carrillo





A/P


Problem List:  


(1) Pleural effusion on left


ICD Code:  J90 - Pleural effusion, not elsewhere classified


Status:  Acute


(2) Respiratory distress


ICD Code:  R06.00 - Dyspnea, unspecified


Status:  Resolved


(3) Hyponatremia


ICD Code:  E87.1 - Hypo-osmolality and hyponatremia


Status:  Acute


(4) COPD (chronic obstructive pulmonary disease)


ICD Code:  J44.9 - Chronic obstructive pulmonary disease, unspecified


Status:  Chronic


(5) Tobacco abuse


ICD Code:  Z72.0 - Tobacco use


Status:  Chronic


Assessment and Plan


Respiratory Insufficiency


Left-sided consolidation 


Exudative pleural effusion.


COPD exacerbation.


Urinary tract infection.


Hx tobacco use


Anemia, thrombocytopenia


Moderate aortic valve regurgitation


Moderate calcific aortic valve stenosis


With resultant suspected pulmonary hypertension with pulmonary arterial 

pressure 69.3











Plan


Continue with oxygen maintain sats above 92%.


Bronchodilators, on solumederol  40mg daily IV. continue to taper, consider 

switching her to po prednisone in AM. Pt is known to Dr. Grey (outpatient)


s/p CT guided left-sided thoracentesis and CT placement by IR 10/26. Yesterday 

accidentally chest tube came off. Repeat chest x-ray shows no change in the mod 

pleural effusion but does shows some mild patchy opacities in the right lung 

base. Encourage use of IS q1hr while awake.


Exudative effusion. Cytology neg for malignant cells. Fluid cx neg x 72hours.


CT chest 10/26 : Either highly complex fluid or soft tissue mass involving the 

left lung base with mass effect. This measures 12.2 x 12.6 x 7.4 cm.  4.4 cm 

ascending aortic aneurysm.


CTS was consulted for eval for decortication. However pt refused any surgical 

intervention initially , she is reconsidering and will see surgeon again. 


completed 5 days of ( Rocephin, Zithromax)


At this time, pt doesn't qualify for rehab or home health PT. Currently getting 

aggressive PT regimen here. Once pt able to stand w assistance of walker and 

hopefully transfer from bed to chair, we may be able to discharge her home. 

Both pt and  agree to this plan.  corbett cath removed this morning and pt 

able to void w no difficulty


Pt is pleased with her progress.


-Echocardiogram from 10/27.  Says ejection fraction within normal limits, 

however expect calculation skewed by regurgitation


-Started on afterload reduction with nifedipine.  Start back on Lasix.  Consult 

cardiology.


-Continue treatment for COPD exacerbation as above.Seen by cardiology Dr Lowery. 

Patient refuses, any time of work up for AS/AI at this time. EF preserved. Thus 

continue medical management with diuresis , daily weight, low salt diet, BP 

control and aggressive medical management for CAD. Once patient decides to 

undergo further evaluation she can follow with Cardiology as an outpatient. 





Discharge Planning


Started treatment for CHF exacerbation, aortic insufficiency


Cardiology evaluation.


Continued weakness.  Continue to work with PT.  Patient is self pay.  Hopefully 

if further strengthening, can go home with .  Appreciate physical 

therapy and case management assistance.


CT surgeon Dr Wallace to see her again for evaluation as patient is considering 

surgical intervention. 








Problem Qualifiers





(1) COPD (chronic obstructive pulmonary disease):  


Qualified Codes:  J43.1 - Panlobular emphysema








Rose Mary Meléndez MD Nov 5, 2017 10:07

## 2017-11-05 NOTE — HHI.PR
Subjective


Remarks


Pain  along  left  chest wall. NO  SOB .Walks  with a walker.


Chest tube  is  out. Advised  her  again  about  doing  a VATS thoracotomy and  

decortication.


Has  a foot problem  .





Objective





Vital Signs








  Date Time  Temp Pulse Resp B/P (MAP) Pulse Ox O2 Delivery O2 Flow Rate FiO2


 


11/5/17 12:30 98.0 86 18 123/72 (89) 95   


 


11/5/17 11:43   18     


 


11/5/17 08:19   18     


 


11/5/17 08:00 97.5 98 18 136/76 (96) 97   


 


11/5/17 04:00 97.5 89 18 125/68 (87) 95   


 


11/5/17 00:00 98.2 87 18 128/72 (90) 95   


 


11/4/17 20:00 97.4 92 18 126/71 (89) 100   


 


11/4/17 20:00      Room Air  21


 


11/4/17 19:59  88      


 


11/4/17 18:40 98.0 100 18 125/70 (88) 94   














I/O      


 


 11/4/17 11/4/17 11/4/17 11/5/17 11/5/17 11/5/17





 07:00 15:00 23:00 07:00 15:00 23:00


 


Intake Total 480 ml  480 ml   


 


Balance 480 ml  480 ml   


 


      


 


Intake Oral 480 ml  480 ml   


 


# Voids 1  2   


 


# Bowel Movements 1     








Result Diagram:  


11/3/17 0732                                                                   

             11/3/17 0732





Objective Remarks


GENERAL:  This is an averagely built middle-aged white female who is pale and


not dyspneic.


HEENT:  Head normocephalic.  Pupils are reactive and equal.  Tongue moist.


Throat is clear.  Nasal mucosa dry.


NECK:  Supple.  No bruits, thyroid enlargement or lymphadenopathy.


CHEST:  Distant breath sounds at the left  lung base, with occasional crackles 

in the


left upper lung field.


HEART:  The heart sounds are irregular, S1-S2.  No murmur.  No S3.


ABDOMEN:  Soft and nontender.  No organomegaly.  Bowel sounds are active.


EXTREMITIES:  Edema 1+ with diminished peripheral pulses. Bilateral Foot  

deformity.  NEUROLOGIC:


Reflexes are 1+ with no gross motor deficits.  


RECTAL:   Exam is deferred.


SKIN: Dry and scaly.





Assessment and Plan


Assessment and Plan





 


IMPRESSION


1.  COPD with acute exacerbation


2.  Chronic left basilar effusion with loculation


3.  Atelectasis left lower lobe with mucus plugging.


4.  Hyponatremia.


5.  Abnormal liver enzymes


6.  Anemia and chronic disease.











Plan :








1. O2  2 L. prn





2. IS q2h  at bedside





3. Prednisone 10 mg daily.





4. Will  ask CT surgery to  see her  again





5. CBC,BMP.





6. PT evaluation  for   ambulation.





7. Continue  Lasix  20 mg daily.











NATA Grey MD Nov 5, 2017 14:06

## 2017-11-06 NOTE — HHI.PR
Subjective


Remarks


Says she is improving with PT, has pain and is not controlled with pain meds 

current q6 hrs, will change norco to q4 hrs. Patient says se was able to walk 

15 feet yesterday with PT. Wants to do surgery and to improve to go home. Dr Rodriguez will reevaluate patient.





Objective


Vitals





Vital Signs








  Date Time  Temp Pulse Resp B/P (MAP) Pulse Ox O2 Delivery O2 Flow Rate FiO2


 


11/6/17 04:00 97.6 88 18 114/71 (85) 98   


 


11/6/17 04:00      Room Air  


 


11/6/17 00:00 98.0 92 18 126/73 (90) 96   


 


11/6/17 00:00      Room Air  


 


11/5/17 20:00 98.1 94 16 117/70 (86) 97   


 


11/5/17 20:00  101      


 


11/5/17 20:00      Room Air  


 


11/5/17 16:17 98.1 91 18 131/81 (98) 97   


 


11/5/17 15:40   18     


 


11/5/17 15:16   18     


 


11/5/17 12:30 98.0 86 18 123/72 (89) 95   














I/O      


 


 11/5/17 11/5/17 11/5/17 11/6/17 11/6/17 11/6/17





 07:00 15:00 23:00 07:00 15:00 23:00


 


Intake Total  240 ml  480 ml  


 


Balance  240 ml  480 ml  


 


      


 


Intake Oral  240 ml  480 ml  


 


# Voids  3  2  


 


# Bowel Movements    0  








Result Diagram:  


11/3/17 0732                                                                   

             11/3/17 0732





Imaging





Last Impressions








Chest X-Ray 11/3/17 0000 Signed





Impressions: 





 Service Date/Time:  Friday, November 3, 2017 13:06 - CONCLUSION: Moderate to 





 large left pleural effusion occupying one half of the left hemithorax.      





 Roland Oswald MD  FACR


 


Chest CT 10/28/17 0000 Signed





Impressions: 





 Service Date/Time:  Saturday, October 28, 2017 14:44 - CONCLUSION:  1. 

Interval 





 placement of left-sided chest tube with slight decrease in the complex 





 low-density fluid collection in the lower left hemithorax. 2. Volume loss 





 remains in the left hemithorax with consolidation in the left infrahilar 

region 





 3. New small right pleural effusion and consolidation in the right posterior 





 lower lobe 4. The previously noted fluid and debris in the left mainstem 





 bronchus is no longer present.     Ibrahima Mccarthy MD 


 


Chest Ultrasound 10/27/17 0000 Signed





Impressions: 





 Service Date/Time:  Friday, October 27, 2017 09:59 - CONCLUSION:  No focal 





 collections of anechoic free fluid.  Prominent amount of heterogeneous 





 echotexture material surrounding the left lower chest.     Efrain Samuels MD 


 


Chest Tube Insertion 10/26/17 1224 Signed





Impressions: 





 Service Date/Time:  Thursday, October 26, 2017 13:22 - CONCLUSION: 

Uncomplicated 





 CT-guided thoracentesis.     Jorge Carrillo MD 








Objective Remarks


GENERAL: Patient sitting up in bed.  Appears comfortable.  Alert and oriented 3


NECK: Supple, trachea midline. No JVD.


CARDIOVASCULAR: Regular rate and rhythm without murmurs, gallops, or rubs. 


RESPIRATORY: Breath sounds equal bilaterally. No accessory muscle use.


GASTROINTESTINAL: Abdomen soft, non-tender, nondistended. 


MUSCULOSKELETAL: No cyanosis, or edema. 


BACK: Nontender without obvious deformity. No CVA tenderness.








Procedures


CT guided thoracentesis with L chest tube placement 10/26 Dr. Carrillo





A/P


Problem List:  


(1) Pleural effusion on left


ICD Code:  J90 - Pleural effusion, not elsewhere classified


Status:  Acute


(2) Respiratory distress


ICD Code:  R06.00 - Dyspnea, unspecified


Status:  Resolved


(3) Hyponatremia


ICD Code:  E87.1 - Hypo-osmolality and hyponatremia


Status:  Acute


(4) COPD (chronic obstructive pulmonary disease)


ICD Code:  J44.9 - Chronic obstructive pulmonary disease, unspecified


Status:  Chronic


(5) Tobacco abuse


ICD Code:  Z72.0 - Tobacco use


Status:  Chronic


Assessment and Plan


Respiratory Insufficiency


Left-sided consolidation 


Exudative pleural effusion.


COPD exacerbation.


Urinary tract infection.


Hx tobacco use


Anemia, thrombocytopenia


Moderate aortic valve regurgitation


Moderate calcific aortic valve stenosis


With resultant suspected pulmonary hypertension with pulmonary arterial 

pressure 69.3











Plan


Continue with oxygen maintain sats above 92%.


Bronchodilators, on solumederol  40mg daily IV. continue to taper, consider 

switching her to po prednisone in AM. Pt is known to Dr. Grey (outpatient)


s/p CT guided left-sided thoracentesis and CT placement by IR 10/26. Yesterday 

accidentally chest tube came off. Repeat chest x-ray shows no change in the mod 

pleural effusion but does shows some mild patchy opacities in the right lung 

base. Encourage use of IS q1hr while awake.


Exudative effusion. Cytology neg for malignant cells. Fluid cx neg x 72hours.


CT chest 10/26 : Either highly complex fluid or soft tissue mass involving the 

left lung base with mass effect. This measures 12.2 x 12.6 x 7.4 cm.  4.4 cm 

ascending aortic aneurysm.


CTS was consulted for eval for decortication. However pt refused any surgical 

intervention initially , she is reconsidering and will see surgeon again. 


completed 5 days of ( Rocephin, Zithromax)


At this time, pt doesn't qualify for rehab or home health PT. Currently getting 

aggressive PT regimen here. Once pt able to stand w assistance of walker and 

hopefully transfer from bed to chair, we may be able to discharge her home. 

Both pt and  agree to this plan.  corbett cath removed this morning and pt 

able to void w no difficulty


Pt is pleased with her progress.


-Echocardiogram from 10/27.  Says ejection fraction within normal limits, 

however expect calculation skewed by regurgitation


-Started on afterload reduction with nifedipine.  Start back on Lasix.  Consult 

cardiology.


-Continue treatment for COPD exacerbation as above.Seen by cardiology Dr Lowery. 

Patient refuses, any time of work up for AS/AI at this time. EF preserved. Thus 

continue medical management with diuresis , daily weight, low salt diet, BP 

control and aggressive medical management for CAD. Once patient decides to 

undergo further evaluation she can follow with Cardiology as an outpatient. 





Discharge Planning


Started treatment for CHF exacerbation, aortic insufficiency


Cardiology evaluation.


Continued weakness.  Continue to work with PT.  Patient is self pay.  Hopefully 

if further strengthening, can go home with .  Appreciate physical 

therapy and case management assistance.


CT surgeon Dr Wallace to see her again for evaluation as patient is considering 

surgical intervention. 








Problem Qualifiers





(1) COPD (chronic obstructive pulmonary disease):  


Qualified Codes:  J43.1 - Panlobular emphysema








Rose Mary Meléndez MD Nov 6, 2017 08:59

## 2017-11-06 NOTE — HHI.PR
Subjective


Remarks


Feels  OK . NO  SOB .Walks  with a walker.


She  is  now  agreeable  to  go  for  Vats  Thoracotomy  decortication.


Has  a foot problem  .





Objective





Vital Signs








  Date Time  Temp Pulse Resp B/P (MAP) Pulse Ox O2 Delivery O2 Flow Rate FiO2


 


11/6/17 16:00 97.6 87 18 116/66 (83) 95   


 


11/6/17 15:00   22     


 


11/6/17 12:00 98.6 74 18 134/70 (91) 99   


 


11/6/17 11:03   20     


 


11/6/17 09:45   20     


 


11/6/17 09:00      Room Air  


 


11/6/17 08:00 98.0 92 19 129/77 (94) 98   


 


11/6/17 07:53  83      


 


11/6/17 04:00 97.6 88 18 114/71 (85) 98   


 


11/6/17 04:00      Room Air  


 


11/6/17 00:00 98.0 92 18 126/73 (90) 96   


 


11/6/17 00:00      Room Air  


 


11/5/17 20:00 98.1 94 16 117/70 (86) 97   


 


11/5/17 20:00  101      


 


11/5/17 20:00      Room Air  














I/O      


 


 11/5/17 11/5/17 11/5/17 11/6/17 11/6/17 11/6/17





 07:00 15:00 23:00 07:00 15:00 23:00


 


Intake Total  240 ml  480 ml  960 ml


 


Balance  240 ml  480 ml  960 ml


 


      


 


Intake Oral  240 ml  480 ml  960 ml


 


# Voids  3  2  5


 


# Bowel Movements    0  








Result Diagram:  


11/3/17 0732                                                                   

             11/3/17 0732





Objective Remarks


GENERAL:  This is an averagely built middle-aged white female who is pale and


not dyspneic.


HEENT:  Head normocephalic.  Pupils are reactive and equal.  Tongue moist.


Throat is clear.  Nasal mucosa dry.


NECK:  Supple.  No bruits, thyroid enlargement or lymphadenopathy.


CHEST:  Distant breath sounds at the left  lung base, with occasional crackles 

in the


left upper lung field.


HEART:  The heart sounds are irregular, S1-S2.  No murmur.  No S3.


ABDOMEN:  Soft and nontender.  No organomegaly.  Bowel sounds are active.


EXTREMITIES:  Edema 1+ with diminished peripheral pulses. Bilateral Foot  

deformity.  NEUROLOGIC:


Reflexes are 1+ with no gross motor deficits.  


RECTAL:   Exam is deferred.


SKIN: Dry and scaly.





Assessment and Plan


Assessment and Plan





 


IMPRESSION


1.  COPD with acute exacerbation


2.  Chronic left basilar effusion with loculation


3.  Atelectasis left lower lobe with mucus plugging.


4.  Hyponatremia.


5.  Abnormal liver enzymes


6.  Anemia and chronic disease.











Plan :








1. O2  2 L. prn





2. IS q2h  at bedside





3. D/C Prednisone





4. Will  ask CT surgery to  see her  again





5. CBC,BMP.CXR in am





6. PT evaluation  for   ambulation.





7. Continue  Lasix  20 mg daily.











NATA Grey MD Nov 6, 2017 19:17

## 2017-11-07 NOTE — HHI.PR
Subjective


Remarks


Seen by Dr Wallace plan for decortication tomorrow. 


Patient say she has constipation despite taking stool softeners and MOM, will 

add lactulose. 


No chest pain , no n/v/d.





Objective


Vitals





Vital Signs








  Date Time  Temp Pulse Resp B/P (MAP) Pulse Ox O2 Delivery O2 Flow Rate FiO2


 


11/7/17 04:00 97.6 110 20 132/75 (94) 95   


 


11/7/17 00:00 97.6 97 20 118/66 (83) 94   


 


11/6/17 21:29  80      


 


11/6/17 20:00 97.6 90 20 122/72 (89) 97   


 


11/6/17 19:45      Room Air  


 


11/6/17 16:00 97.6 87 18 116/66 (83) 95   


 


11/6/17 15:00   22     


 


11/6/17 12:00 98.6 74 18 134/70 (91) 99   


 


11/6/17 11:03   20     


 


11/6/17 09:45   20     


 


11/6/17 09:00      Room Air  














I/O      


 


 11/6/17 11/6/17 11/6/17 11/7/17 11/7/17 11/7/17





 07:00 15:00 23:00 07:00 15:00 23:00


 


Intake Total 480 ml  960 ml 480 ml  


 


Balance 480 ml  960 ml 480 ml  


 


      


 


Intake Oral 480 ml  960 ml 480 ml  


 


# Voids 2  5 1  


 


# Bowel Movements 0     








Result Diagram:  


11/3/17 0732                                                                   

             11/3/17 0732





Imaging





Last Impressions








Chest X-Ray 11/3/17 0000 Signed





Impressions: 





 Service Date/Time:  Friday, November 3, 2017 13:06 - CONCLUSION: Moderate to 





 large left pleural effusion occupying one half of the left hemithorax.      





 Roland Oswald MD  FACR


 


Chest CT 10/28/17 0000 Signed





Impressions: 





 Service Date/Time:  Saturday, October 28, 2017 14:44 - CONCLUSION:  1. 

Interval 





 placement of left-sided chest tube with slight decrease in the complex 





 low-density fluid collection in the lower left hemithorax. 2. Volume loss 





 remains in the left hemithorax with consolidation in the left infrahilar 

region 





 3. New small right pleural effusion and consolidation in the right posterior 





 lower lobe 4. The previously noted fluid and debris in the left mainstem 





 bronchus is no longer present.     Ibrahima Mccarthy MD 


 


Chest Ultrasound 10/27/17 0000 Signed





Impressions: 





 Service Date/Time:  Friday, October 27, 2017 09:59 - CONCLUSION:  No focal 





 collections of anechoic free fluid.  Prominent amount of heterogeneous 





 echotexture material surrounding the left lower chest.     Efrain Samuels MD 


 


Chest Tube Insertion 10/26/17 1224 Signed





Impressions: 





 Service Date/Time:  Thursday, October 26, 2017 13:22 - CONCLUSION: 

Uncomplicated 





 CT-guided thoracentesis.     Jorge Carrillo MD 








Objective Remarks


GENERAL: Patient sitting up in bed.  Appears comfortable.  Alert and oriented 3


NECK: Supple, trachea midline. No JVD.


CARDIOVASCULAR: Regular rate and rhythm without murmurs, gallops, or rubs. 


RESPIRATORY: Breath sounds equal bilaterally. No accessory muscle use.


GASTROINTESTINAL: Abdomen soft, non-tender, nondistended. 


MUSCULOSKELETAL: No cyanosis, or edema. 


BACK: Nontender without obvious deformity. No CVA tenderness.








Procedures


CT guided thoracentesis with L chest tube placement 10/26 Dr. Carrillo





A/P


Problem List:  


(1) Pleural effusion on left


ICD Code:  J90 - Pleural effusion, not elsewhere classified


Status:  Acute


(2) Respiratory distress


ICD Code:  R06.00 - Dyspnea, unspecified


Status:  Resolved


(3) Hyponatremia


ICD Code:  E87.1 - Hypo-osmolality and hyponatremia


Status:  Acute


(4) COPD (chronic obstructive pulmonary disease)


ICD Code:  J44.9 - Chronic obstructive pulmonary disease, unspecified


Status:  Chronic


(5) Tobacco abuse


ICD Code:  Z72.0 - Tobacco use


Status:  Chronic


Assessment and Plan


Respiratory Insufficiency


Left-sided consolidation 


Exudative pleural effusion.


COPD exacerbation.


Urinary tract infection.


Hx tobacco use


Anemia, thrombocytopenia


Moderate aortic valve regurgitation


Moderate calcific aortic valve stenosis


With resultant suspected pulmonary hypertension with pulmonary arterial 

pressure 69.3











Plan


Continue with oxygen maintain sats above 92%.


Bronchodilators, on solumederol  40mg daily IV. continue to taper, consider 

switching her to po prednisone in AM. Pt is known to Dr. Grey (outpatient)


s/p CT guided left-sided thoracentesis and CT placement by IR 10/26. Chest tube 

was removed. Repeat chest x-ray shows no change in the mod pleural effusion but 

does shows some mild patchy opacities in the right lung base. Encourage use of 

IS q1hr while awake.


Exudative effusion. Cytology neg for malignant cells. Fluid cx neg x 72hours.


CT chest 10/26 : Either highly complex fluid or soft tissue mass involving the 

left lung base with mass effect. This measures 12.2 x 12.6 x 7.4 cm.  4.4 cm 

ascending aortic aneurysm.


CTS was consulted for eval for decortication. However pt refused any surgical 

intervention initially, reconsidered and plan for surgery 11/8/17  by Dr Wallace. Keep NPO after midnight. CTS ff. 


completed 5 days of ( Rocephin, Zithromax)


At this time, pt doesn't qualify for rehab or home health PT. Currently getting 

aggressive PT regimen here. Once pt able to stand w assistance of walker and 

hopefully transfer from bed to chair, we may be able to discharge her home. 

Both pt and  agree to this plan.  corbett cath removed and pt able to void 

w no difficulty


Pt is pleased with her progress.


-Echocardiogram from 10/27.  Says ejection fraction within normal limits, 

however expect calculation skewed by regurgitation


-Started on afterload reduction with nifedipine.  Start back on Lasix.  Consult 

cardiology.


-Continue treatment for COPD exacerbation as above.Seen by cardiology Dr Lowery. 

Patient refuses, any time of work up for AS/AI at this time. EF preserved. Thus 

continue medical management with diuresis , daily weight, low salt diet, BP 

control and aggressive medical management for CAD. Once patient decides to 

undergo further evaluation she can follow with Cardiology as an outpatient. 





Discharge Planning


Started treatment for CHF exacerbation, aortic insufficiency


Cardiology evaluation.


Continued weakness.  Continue to work with PT.  Patient is self pay.  Hopefully 

if further strengthening, can go home with .  Appreciate physical 

therapy and case management assistance.


Seen by CT surgeon Dr Wallace, plans for decortication tomorrow 11/8/17.





Problem Qualifiers





(1) COPD (chronic obstructive pulmonary disease):  


Qualified Codes:  J43.1 - Panlobular emphysema








Rose Mary Meléndez MD Nov 7, 2017 08:07

## 2017-11-07 NOTE — HHI.PR
Subjective


Remarks


Will  go  for  surgery in am . NO  SOB .Walks  with a walker.


She  is  now  agreeable  to  go  for  Vats  Thoracotomy and   decortication.


Has  a foot problem .





Objective





Vital Signs








  Date Time  Temp Pulse Resp B/P (MAP) Pulse Ox O2 Delivery O2 Flow Rate FiO2


 


11/7/17 16:20 97.4 100 19 130/75 (93) 96   


 


11/7/17 12:00 97.8 78 20 119/66 (83) 95   


 


11/7/17 08:00 97.4 90 20 132/72 (92) 94   


 


11/7/17 04:00 97.6 110 20 132/75 (94) 95   


 


11/7/17 00:00 97.6 97 20 118/66 (83) 94   


 


11/6/17 21:29  80      


 


11/6/17 20:00 97.6 90 20 122/72 (89) 97   


 


11/6/17 19:45      Room Air  














I/O      


 


 11/6/17 11/6/17 11/6/17 11/7/17 11/7/17 11/7/17





 07:00 15:00 23:00 07:00 15:00 23:00


 


Intake Total 480 ml  960 ml 480 ml  760 ml


 


Output Total      600 ml


 


Balance 480 ml  960 ml 480 ml  160 ml


 


      


 


Intake Oral 480 ml  960 ml 480 ml  760 ml


 


Output Urine Total      600 ml


 


# Voids 2  5 1  


 


# Bowel Movements 0     0








Result Diagram:  


11/3/17 0732                                                                   

             11/7/17 0900





Objective Remarks


GENERAL:  This is an averagely built middle-aged white female who is pale and


not dyspneic.


HEENT:  Head normocephalic.  Pupils are reactive and equal.  Tongue moist.


Throat is clear.  Nasal mucosa dry.


NECK:  Supple.  No bruits, thyroid enlargement or lymphadenopathy.


CHEST:  Distant breath sounds at the left  lung base and occ wheeze.


HEART:  The heart sounds are irregular, S1-S2.  No murmur.  No S3.


ABDOMEN:  Soft and nontender.  No organomegaly.  Bowel sounds are active.


EXTREMITIES:  Edema 1+ with diminished peripheral pulses. Bilateral Foot  

deformity.  NEUROLOGIC:


Reflexes are 1+ with no gross motor deficits.  


SKIN: Dry and scaly.





Assessment and Plan


Assessment and Plan





 


IMPRESSION


1.  COPD with acute exacerbation


2.  Chronic left basilar effusion with loculation


3.  Atelectasis left lower lobe with mucus plugging.


4.  Hyponatremia.


5.  Abnormal liver enzymes


6.  Anemia and chronic disease.











Plan :








1. O2  2 L. prn





2. IS q2h  at bedside





3. For  Vats  thoracotomy  and  Decortication





4. Duoneb   nebs tid.





5. CBC,BMP. in am





6. PT evaluation  for   ambulation.





7. Continue  Lasix  20 mg daily.











NATA Grey MD Nov 7, 2017 19:11

## 2017-11-07 NOTE — PD.CAR.PN
CVT Progress Note


Subjective/Hospital Course:


56-year-old white female with a history of COPD, chronic bronchitis who 

apparently was suffering from food poisoning.  The patient states that she was 

unable to pass urine and became very weak, had lost weight with diarrhea, 

abdominal discomfort, dehydration and presented to ER for evaluation. She c/o 

20 lb weight loss over the past 4 months. Chest CT done showed evidence of a 

loculated  mass-like infiltrate in the left lung base 12 x 12 cm and a left 

upper lobe lung nodule 7 mm.  The patient was started on Solu-Medrol, IV 

antibiotics including Rocephin and Zithromax.  Denies chest pains or abdominal 

pains or nausea or vomiting. She underwent chest tube placement in IR 10/26


with minimal drainage of this effusion.. Chest tube was removed. Repeat chest x-

ray shows no change in the mod pleural effusion but does shows some mild patchy 

opacities in the right lung base. 


cardiology Dr Sebastián de la torre pt   TTE shows preserved EF and at least moderate AS/AI.





present diagnosis : Respiratory Insufficiency, Left-sided consolidation , 

Exudative pleural effusion, COPD exacerbation, Urinary tract infection, x 

tobacco use


Anemia, thrombocytopenia, Moderate aortic valve regurgitation, Moderate 

calcific aortic valve stenosis





pleural fluid : exudative effusion. Cytology neg for malignant cells. Fluid cx 

neg 


CT chest 10/26 : Either highly complex fluid or soft tissue mass involving the 

left lung base with mass effect. This measures 12.2 x 12.6 x 7.4 cm.  4.4 cm 

ascending aortic aneurysm.





pt is now agreeable for surgery : plan is for left thoracoscopic exploration 

for loculated pleural effusion , possible  thoracotomy , decortication in am


Objective:


GENERAL: 


SKIN: Warm and dry.


HEAD: Normocephalic.


EYES: No scleral icterus. No injection or drainage. 


NECK: Supple, trachea midline. No JVD or lymphadenopathy.


CARDIOVASCULAR: Regular rate and rhythm without murmurs, gallops, or rubs. 


RESPIRATORY: diminished on left lower lobe  No accessory muscle use.


GASTROINTESTINAL: Abdomen soft, non-tender, nondistended. 


MUSCULOSKELETAL: No cyanosis, or edema. 


BACK: Nontender without obvious deformity. No CVA tenderness.








Vital Signs








  Date Time  Temp Pulse Resp B/P (MAP) Pulse Ox O2 Delivery O2 Flow Rate FiO2


 


11/7/17 12:00 97.8 78 20 119/66 (83) 95   


 


11/7/17 08:00 97.4 90 20 132/72 (92) 94   


 


11/7/17 04:00 97.6 110 20 132/75 (94) 95   


 


11/7/17 00:00 97.6 97 20 118/66 (83) 94   


 


11/6/17 21:29  80      


 


11/6/17 20:00 97.6 90 20 122/72 (89) 97   


 


11/6/17 19:45      Room Air  


 


11/6/17 16:00 97.6 87 18 116/66 (83) 95   








Labs:





Laboratory Tests








Test


  11/7/17


09:00


 


Blood Urea Nitrogen 9 MG/DL (7-18) 


 


Creatinine


  0.65 MG/DL


(0.50-1.00)


 


Random Glucose


  95 MG/DL


()


 


Calcium Level


  8.5 MG/DL


(8.5-10.1)


 


Sodium Level


  134 MEQ/L


(136-145)


 


Potassium Level


  3.8 MEQ/L


(3.5-5.1)


 


Chloride Level


  98 MEQ/L


()


 


Carbon Dioxide Level


  31.9 MEQ/L


(21.0-32.0)


 


Anion Gap 4 MEQ/L (5-15) 


 


Estimat Glomerular Filtration


Rate 94 ML/MIN


(>89)








Result Diagram:  


11/3/17 0732                                                                   

             11/7/17 0900








(1) COPD (chronic obstructive pulmonary disease)


Plan:   


IMPRESSION


1.  COPD with acute exacerbation


2.  Chronic left basilar effusion with loculation


3.  Atelectasis left lower lobe with mucus plugging.


4.  Hyponatremia.


5.  Abnormal liver enzymes


6.  Anemia and chronic disease.


7. Aortic Stenosis/AI


8. Soft tissue mass involving the left lung base with mass effect.  


9. 4.4 cm ascending aortic aneurysm.


10. Deconditioned





Patient refuses, any time of work up for AS/AI at this time. EF preserved. Thus 

continue medical management with diuresis , daily weight, low salt diet, BP 

control and aggressive medical management for CAD. Once patient decides to 

undergo further evaluation she can follow with Cardiology as an outpatient. 





Thank you for the opportunity to take part in the car of this patient. 





Will be available  on a PRN basis for any questions or concerns





(2) Respiratory distress


(3) Tobacco abuse


(4) Physical deconditioning


(5) Pleural effusion on left


Plan:  for left thoracoscopic exploration for loculated pleural effusion in am 





(6) Hyponatremia


(7) Hypochloremia





Problem Qualifiers





(1) COPD (chronic obstructive pulmonary disease):  


Qualified Codes:  J43.1 - Panlobular emphysema








Terwilliger,Jacqueline R. ARNP Nov 7, 2017 16:05

## 2017-11-08 NOTE — HHI.PR
Subjective


Remarks


had a good night- "little anxious" but looking forward to procedure today





Objective


Vitals





Vital Signs








  Date Time  Temp Pulse Resp B/P (MAP) Pulse Ox O2 Delivery O2 Flow Rate FiO2


 


11/8/17 08:00 97.5 85 18 135/63 (87) 94   


 


11/8/17 04:05     94   


 


11/8/17 04:00 97.9 78 18 139/68 (91) 97   


 


11/8/17 00:00 97.9 95 21 132/71 (91) 95   


 


11/7/17 21:10  77      


 


11/7/17 20:00 98.1 90 20 110/70 (83) 97   


 


11/7/17 19:45      Room Air  


 


11/7/17 16:20 97.4 100 19 130/75 (93) 96   


 


11/7/17 12:00 97.8 78 20 119/66 (83) 95   














I/O      


 


 11/7/17 11/7/17 11/7/17 11/8/17 11/8/17 11/8/17





 07:00 15:00 23:00 07:00 15:00 23:00


 


Intake Total 480 ml  760 ml 0 ml  


 


Output Total   600 ml   


 


Balance 480 ml  160 ml 0 ml  


 


      


 


Intake Oral 480 ml  760 ml 0 ml  


 


Output Urine Total   600 ml   


 


# Voids 1   2  


 


# Bowel Movements   0   








Result Diagram:  


11/7/17 0900





Imaging





Last Impressions








Chest X-Ray 11/3/17 0000 Signed





Impressions: 





 Service Date/Time:  Friday, November 3, 2017 13:06 - CONCLUSION: Moderate to 





 large left pleural effusion occupying one half of the left hemithorax.      





 Roland Oswald MD  FACR


 


Chest CT 10/28/17 0000 Signed





Impressions: 





 Service Date/Time:  Saturday, October 28, 2017 14:44 - CONCLUSION:  1. 

Interval 





 placement of left-sided chest tube with slight decrease in the complex 





 low-density fluid collection in the lower left hemithorax. 2. Volume loss 





 remains in the left hemithorax with consolidation in the left infrahilar 

region 





 3. New small right pleural effusion and consolidation in the right posterior 





 lower lobe 4. The previously noted fluid and debris in the left mainstem 





 bronchus is no longer present.     Ibrahima Mccarthy MD 


 


Chest Ultrasound 10/27/17 0000 Signed





Impressions: 





 Service Date/Time:  Friday, October 27, 2017 09:59 - CONCLUSION:  No focal 





 collections of anechoic free fluid.  Prominent amount of heterogeneous 





 echotexture material surrounding the left lower chest.     Efrain Samuels MD 


 


Chest Tube Insertion 10/26/17 1224 Signed





Impressions: 





 Service Date/Time:  Thursday, October 26, 2017 13:22 - CONCLUSION: 

Uncomplicated 





 CT-guided thoracentesis.     Jorge Carrillo MD 








Objective Remarks


awake and alert, no acute distress


0s sat 99% at room air


anicteric


lungs- no rales or wheezes


regular rhythm, 3/6 systolic murmur left sternal border


trace bilateral pretibial edema


Procedures


CT guided thoracentesis with L chest tube placement 10/26 Dr. Carrillo





A/P


Problem List:  


(1) Pleural effusion on left


ICD Code:  J90 - Pleural effusion, not elsewhere classified


Status:  Acute


(2) Respiratory distress


ICD Code:  R06.00 - Dyspnea, unspecified


Status:  Resolved


(3) Hyponatremia


ICD Code:  E87.1 - Hypo-osmolality and hyponatremia


Status:  Acute


(4) COPD (chronic obstructive pulmonary disease)


ICD Code:  J44.9 - Chronic obstructive pulmonary disease, unspecified


Status:  Chronic


(5) Tobacco abuse


ICD Code:  Z72.0 - Tobacco use


Status:  Chronic


Assessment and Plan


Respiratory Insufficiency


Left-sided consolidation 


Exudative pleural effusion.


COPD exacerbation.


Urinary tract infection.


Hx tobacco use


Anemia, thrombocytopenia


Moderate aortic valve regurgitation


Moderate calcific aortic valve stenosis


With resultant suspected pulmonary hypertension with pulmonary arterial 

pressure 69.3








Plan


Continue with oxygen maintain sats above 92%.


Bronchodilators, on Prednisone 10 mg daily -  Dr. Que polanco (outpatient)


s/p CT guided left-sided thoracentesis and CT placement by IR 10/26. CT oout.  

Encourage use of IS q1hr while awake.


Exudative effusion. Cytology neg for malignant cells. Fluid cx neg x 72hours.


CT chest 10/26 : Either highly complex fluid or soft tissue mass involving the 

left lung base with mass effect. This measures 12.2 x 12.6 x 7.4 cm.  4.4 cm 

ascending aortic aneurysm.


CTS  ff- for decortication surgery today - 11/8/17  by Dr Wallace. Keep NPO 

after midnight. CTS ff. 


completed 5 days of ( Rocephin, Zithromax)


At this time, pt doesn't qualify for rehab or home health PT. Currently getting 

aggressive PT regimen here. Once pt able to stand w assistance of walker and 

hopefully transfer from bed to chair, we may be able to discharge her home. 

Both pt


Pt is pleased with her progress.


-Echocardiogram from 10/27.  Says ejection fraction within normal limits, 

however expect calculation skewed by regurgitation


- on afterload reduction with nifedipine.  on Lasix.  Cardiology ff


-Continue treatment for COPD exacerbation as above.Seen by cardiology Dr Lowery. 

Patient refuses, any time of work up for AS/AI at this time. EF preserved. Thus 

continue medical management with diuresis , daily weight, low salt diet, BP 

control and aggressive medical management for CAD. Once patient decides to 

undergo further evaluation she can follow with Cardiology as an outpatient. 





Discharge Planning


Started treatment for CHF exacerbation, aortic insufficiency


Continue to work with PT.  Patient is self pay.  Hopefully if further 

strengthening, can go home with .  Appreciate physical therapy and case 

management assistance.


Seen by CT surgeon Dr Wallace, plans for decortication  today 11/8/17.





Problem Qualifiers





(1) COPD (chronic obstructive pulmonary disease):  


Qualified Codes:  J43.1 - Panlobular emphysema








Linette Mcghee MD Nov 8, 2017 09:43

## 2017-11-08 NOTE — HHI.PR
Subjective


Remarks


Will  go  for  surgery today. NO  SOB .Walks  with a walker.


O2  sat 97 on RA


Good  output. .





Objective





Vital Signs








  Date Time  Temp Pulse Resp B/P (MAP) Pulse Ox O2 Delivery O2 Flow Rate FiO2


 


11/8/17 12:00 97.6 75 18 130/70 (90) 92   


 


11/8/17 09:40     98   21


 


11/8/17 08:00 97.5 85 18 135/63 (87) 94   


 


11/8/17 08:00     96 Room Air  21


 


11/8/17 08:00  86      


 


11/8/17 04:05     94   


 


11/8/17 04:00 97.9 78 18 139/68 (91) 97   


 


11/8/17 00:00 97.9 95 21 132/71 (91) 95   


 


11/7/17 21:10  77      


 


11/7/17 20:00 98.1 90 20 110/70 (83) 97   


 


11/7/17 19:45      Room Air  














I/O      


 


 11/7/17 11/7/17 11/7/17 11/8/17 11/8/17 11/8/17





 07:00 15:00 23:00 07:00 15:00 23:00


 


Intake Total 480 ml  760 ml 0 ml  


 


Output Total   600 ml   


 


Balance 480 ml  160 ml 0 ml  


 


      


 


Intake Oral 480 ml  760 ml 0 ml  


 


Output Urine Total   600 ml   


 


# Voids 1   2  


 


# Bowel Movements   0   








Result Diagram:  


11/7/17 0900





Objective Remarks


GENERAL:  This is an averagely built middle-aged white female who is pale and


not dyspneic.


HEENT:  Head normocephalic.  Pupils are reactive and equal.  Tongue moist.


Throat is clear.  Nasal mucosa dry.


NECK:  Supple.  No bruits, thyroid enlargement or lymphadenopathy.


CHEST:  Distant breath sounds at the left  lung base and occ crackles


HEART:  The heart sounds are irregular, S1-S2.  No murmur.  No S3.


ABDOMEN:  Soft and nontender.  No organomegaly.  Bowel sounds are active.


EXTREMITIES:  Edema 1+ with diminished peripheral pulses. Bilateral Foot  

deformity.  NEUROLOGIC:


Reflexes are 1+ with no gross motor deficits.  


SKIN: Dry and scaly.





Assessment and Plan


Assessment and Plan





 


IMPRESSION


1.  COPD with acute exacerbation


2.  Chronic left basilar effusion with loculation


3.  Atelectasis left lower lobe with mucus plugging.


4.  Hyponatremia.


5.  Abnormal liver enzymes


6.  Anemia and chronic disease.











Plan :








1. O2  2 L. prn





2. IS q2h  at bedside





3. For  Vats  thoracotomy  and  Decortication





4. Duoneb   nebs tid.





5. CBC,BMP,CXR  in am





6. PT evaluation  for   ambulation.





7. Continue  Lasix  20 mg daily.











NATA Grey MD Nov 8, 2017 19:35

## 2017-11-08 NOTE — EKG
Date Performed: 11/07/2017       Time Performed: 21:26:37

 

PTAGE:      56 years

 

EKG:      Sinus rhythm 

 

 NORMAL ECG

 

PREVIOUS TRACING       : 10/26/2017 09.46

 

DOCTOR:   Ernesto Kevin  Interpretating Date/Time  11/08/2017 09:36:21

## 2017-11-08 NOTE — PD.CAR.PN
CVT Progress Note


Subjective/Hospital Course:


56-year-old white female with a history of COPD, chronic bronchitis who 

apparently was suffering from food poisoning.  The patient states that she was 

unable to pass urine and became very weak, had lost weight with diarrhea, 

abdominal discomfort, dehydration and presented to ER for evaluation. She c/o 

20 lb weight loss over the past 4 months. Chest CT done showed evidence of a 

loculated  mass-like infiltrate in the left lung base 12 x 12 cm and a left 

upper lobe lung nodule 7 mm.  The patient was started on Solu-Medrol, IV 

antibiotics including Rocephin and Zithromax.  Denies chest pains or abdominal 

pains or nausea or vomiting. She underwent chest tube placement in IR 10/26


with minimal drainage of this effusion.. Chest tube was removed. Repeat chest x-

ray shows no change in the mod pleural effusion but does shows some mild patchy 

opacities in the right lung base. 


cardiology Dr Sebastián de la torre pt   TTE shows preserved EF and at least moderate AS/AI.





present diagnosis : Respiratory Insufficiency, Left-sided consolidation , 

Exudative pleural effusion, COPD exacerbation, Urinary tract infection, x 

tobacco use


Anemia, thrombocytopenia, Moderate aortic valve regurgitation, Moderate 

calcific aortic valve stenosis





pleural fluid : exudative effusion. Cytology neg for malignant cells. Fluid cx 

neg 


CT chest 10/26 : Either highly complex fluid or soft tissue mass involving the 

left lung base with mass effect. This measures 12.2 x 12.6 x 7.4 cm.  4.4 cm 

ascending aortic aneurysm.





pt is now agreeable for surgery : plan is for left thoracoscopic exploration 

for loculated pleural effusion , possible  thoracotomy , decortication in am 





11/8


on nasal cannula 


for surgery today


Objective:


GENERAL: 


SKIN: Warm and dry. petechia , ecchymotic areas to both arms


HEAD: Normocephalic.


EYES: No scleral icterus. No injection or drainage. 


NECK: Supple, trachea midline. No JVD or lymphadenopathy.


CARDIOVASCULAR: Regular rate and rhythm without murmurs, gallops, or rubs. 


RESPIRATORY: diminished left lower lobe  No accessory muscle use.


GASTROINTESTINAL: Abdomen soft, non-tender, nondistended. 


MUSCULOSKELETAL: No cyanosis, or edema. 


BACK: Nontender without obvious deformity. No CVA tenderness.








Vital Signs








  Date Time  Temp Pulse Resp B/P (MAP) Pulse Ox O2 Delivery O2 Flow Rate FiO2


 


11/8/17 12:00 97.6 75 18 130/70 (90) 92   


 


11/8/17 09:40     98   21


 


11/8/17 08:00 97.5 85 18 135/63 (87) 94   


 


11/8/17 08:00     96 Room Air  21


 


11/8/17 08:00  86      


 


11/8/17 04:05     94   


 


11/8/17 04:00 97.9 78 18 139/68 (91) 97   


 


11/8/17 00:00 97.9 95 21 132/71 (91) 95   


 


11/7/17 21:10  77      


 


11/7/17 20:00 98.1 90 20 110/70 (83) 97   


 


11/7/17 19:45      Room Air  


 


11/7/17 16:20 97.4 100 19 130/75 (93) 96   








Labs:





Laboratory Tests








Test


  11/8/17


04:30


 


Nasal Screen MRSA (PCR)


  MRSA NOT


DETECTED (NOT








Result Diagram:  


11/7/17 0900








(1) COPD (chronic obstructive pulmonary disease)


Plan:   


IMPRESSION


1.  COPD with acute exacerbation


2.  Chronic left basilar effusion with loculation


3.  Atelectasis left lower lobe with mucus plugging.


4.  Hyponatremia.


5.  Abnormal liver enzymes


6.  Anemia and chronic disease.


7. Aortic Stenosis/AI


8. Soft tissue mass involving the left lung base with mass effect.  


9. 4.4 cm ascending aortic aneurysm.


10. Deconditioned








for surgery today 


left thoracoscopic exploration for loculated pleural effusion , possible 

thoracotomy decortication 





(2) Respiratory distress


(3) Tobacco abuse


(4) Physical deconditioning


(5) Pleural effusion on left


Plan:  for left thoracoscopic exploration for loculated pleural effusion in am 





(6) Hyponatremia


(7) Hypochloremia





Problem Qualifiers





(1) COPD (chronic obstructive pulmonary disease):  


Qualified Codes:  J43.1 - Panlobular emphysema








Terwilliger,Jacqueline R. ARNP Nov 8, 2017 13:35

## 2017-11-08 NOTE — RADRPT
EXAM DATE/TIME:  11/08/2017 20:00 

 

[facility] COMPARISON:     

CHEST SINGLE AP, November 03, 2017, 13:06.

 

                     

INDICATIONS :     

Post thoracotomy.

                     

 

MEDICAL HISTORY :            

Chronic obstructive pulmonary disease.   

 

SURGICAL HISTORY :     

None.   

 

ENCOUNTER:     

Initial                                        

 

ACUITY:     

1 day      

 

PAIN SCORE:     

0/10

 

LOCATION:     

Bilateral chest 

 

FINDINGS:     

A single portable frontal view the chest shows interval placement of 2 thoracostomy tubes on the left
. One is subpulmonic in location and the other projects towards the upper hemithorax. The left pleura
l effusion has decreased considerably in size. Volume loss is noted within the left hemithorax. Mild 
consolidation involving the left base. Right lung is hyperinflated and clear. Heart is mildly enlarge
d. Right-sided central line noted. No pneumothorax.

 

CONCLUSION:     

1. 2 thoracostomy tubes on left without pneumothorax.

2. Volume loss involving the left hemithorax with left basilar consolidation.

3. Central line in good position without pneumothorax.

 

 

 

 Efrain Gomez Jr., MD on November 08, 2017 at 20:20           

Board Certified Radiologist.

 This report was verified electronically.

## 2017-11-08 NOTE — PD.OP
cc:   NATA Grey MD; Jemima Wallace MD


__________________________________________________





Operative Report


Date of Surgery:  Nov 8, 2017


Preoperative Diagnosis:  


(1) Pleural effusion on left


Postoperative Diagnosis:  


Loculated left pleural effusion with pleural thickening.


Procedure:


Left thoracoscopic exploration, left thoracotomy for decortication, evacuation 

of complex loculated effusion, adhesiolysis


Anesthesia:


Dr. Trivedi


Surgeon:


Jemima Wallace


Assistant(s):


Alem


Operation and Findings:


After adequate general anesthesia the patient was placed in the right lateral 

decubitus position and the left chest was prepped and draped in usual manner. A 

small posterior port incision was performed and electrocautery was used to 

obtain hemostasis and carry the dissection down through the fascia.  A 22G 

seeker needle was used initially posteriorly and serosanguious fluid was 

aspirated.   A port was initially placed posteriorly followed by the camera.  

Visualization was somewhat difficult.  Overall, ~600 ml of serosanguinous 

effusion was drained.  A second anterior port was positioned at ~6th 

intercostal space.  There was diffuse pleural thickening and copious fibrinous 

material evacuated from the pleural space.  This was sent for cultures and 

pathology examination.  The pleural space was copiously irrigated.  The left 

lower lobe was trapped by the densely thickened pleura.  Therefore, a formal 

decortication was performed by making a thoracotomy incision from the posterior 

port anteriorly and the lower lobe visceral pleura was removed from the 

underlying lung tissue using electrocautery.  This was also sent to pathology 

for examination.  After removing this pleura from the lower lobe, adhesions 

were lysed to mobilize the upper lobe as well.    Two  32F right angle chest 

tube was positioned  and secured with a 0-silk suture.  The lung was ventilated 

and no significant air leaks were found. The ribs were reapproximated using 

interrupted #2 Vicryl suture.  The latissimus dorsi was reapproximated using a 

0 Vicryl suture.  The subcutaneous tissue was approximated running 2-0 Vicryl 

suture and the skin was approximated using running 4-0 Monocryl subcuticular 

stitch. All sponge and history counts were correct at the close the procedure 

and the patient was transferred to the PACU in stable condition.











Jemima Wallace MD Nov 8, 2017 19:32

## 2017-11-09 NOTE — HHI.PR
Subjective


Remarks


S/P thoracotomy  and  decortication . Has  a chest tube in place. 


O2  sat 97 on 2 L


No fever.C/O pain  along  chest  wall.





Objective





Vital Signs








  Date Time  Temp Pulse Resp B/P (MAP) Pulse Ox O2 Delivery O2 Flow Rate FiO2


 


11/9/17 16:23     93 Nasal Cannula 2.00 


 


11/9/17 16:23 97.9 99 18 100/62 (75) 93   





        


 


11/9/17 14:00   16     


 


11/9/17 11:30     100 Nasal Cannula 2.00 


 


11/9/17 11:30 98.0 98 18 99/69 (79) 97   





        


 


11/9/17 10:15     99 Nasal Cannula 2.00 


 


11/9/17 08:14   16     


 


11/9/17 07:30     100 Nasal Cannula 2.00 


 


11/9/17 07:30  88      


 


11/9/17 07:30 97.5 88 18 118/63 (81) 100   





    104/51 (68)    


 


11/9/17 06:00  81      


 


11/9/17 05:39   16     


 


11/9/17 05:00  78      


 


11/9/17 04:00  79      


 


11/9/17 03:20     100 Nasal Cannula 2.00 


 


11/9/17 03:20 97.4 95 16 110/69 (83) 100   





    108/58 (75)    


 


11/9/17 03:00  77      


 


11/9/17 02:00  78      


 


11/9/17 01:00  76      


 


11/9/17 00:00  72      


 


11/8/17 23:34   16     


 


11/8/17 23:00  72      


 


11/8/17 23:00 97.4 83 16 119/66 (83) 100   





    114/57 (76)    


 


11/8/17 23:00     100 Nasal Cannula 2.00 


 


11/8/17 22:30  72      


 


11/8/17 22:30  80 14 126/67 (86) 100   





    128/58 (81)    


 


11/8/17 21:30 97.3 72 12 116/65 (82) 100 Nasal Cannula 2 





    127/60 (82)    


 


11/8/17 21:26 97.3 77 15 127/59 100   


 


11/8/17 21:15  77 20 114/67 (83) 100 Nasal Cannula 2 





    124/60 (81)    


 


11/8/17 21:00 97.4 76 10 116/57 100   


 


11/8/17 21:00  69 9 105/63 (77) 100 Nasal Cannula 3 





    117/56 (76)    


 


11/8/17 20:46   8     


 


11/8/17 20:45  69 8 108/61 (77) 100 Nasal Cannula 3 





    114/54 (74)    














I/O      


 


 11/8/17 11/8/17 11/8/17 11/9/17 11/9/17 11/9/17





 07:00 15:00 23:00 07:00 15:00 23:00


 


Intake Total 0 ml  2520 ml 490 ml  1609 ml


 


Output Total   3110 ml 500 ml  390 ml


 


Balance 0 ml  -590 ml -10 ml  1219 ml


 


      


 


Intake Oral 0 ml  0 ml 200 ml  1000 ml


 


IV Total   1800 ml 290 ml  609 ml


 


Packed Cells   400 ml   


 


Blood Product IV Normal Saline Flush   320 ml   


 


Output Urine Total   60 ml 200 ml  0 ml


 


Chest Tube Drainage Total   300 ml 300 ml  390 ml


 


Estimated Blood Loss   500 ml   


 


Other   2250 ml   


 


Bladder Scan Volume Amount    160 ml  


 


# Voids 2     


 


# Bowel Movements    0  0








Result Diagram:  


11/9/17 0450 11/9/17 0450





Objective Remarks


GENERAL:  This is an averagely built middle-aged white female who is pale and


 dyspneic.


HEENT:  Head normocephalic.  Pupils are reactive and equal.  Tongue moist.


Throat is clear.  Nasal mucosa dry.


NECK:  Supple.  No bruits, thyroid enlargement or lymphadenopathy.


CHEST:  Distant breath sounds at the left  lung base and  crackles left  chest .


HEART:  The heart sounds are irregular, S1-S2.  No murmur.  No S3.


ABDOMEN:  Soft and nontender.  No organomegaly.  Bowel sounds are active.


EXTREMITIES:  Edema 1+ with diminished peripheral pulses. Bilateral Foot  

deformity.  NEUROLOGIC:


Reflexes are 1+ with no gross motor deficits.  


SKIN: Dry and scaly.





Assessment and Plan


Assessment and Plan





 


IMPRESSION


1.  COPD with acute exacerbation


2.  S/p decortication  left  Chest


3.  Atelectasis left lower lobe with mucus plugging.


4.  Hyponatremia.


5.  Abnormal liver enzymes


6.  Anemia and chronic disease.











Plan :








1. O2  2 L. 





2. IS q2h  at bedside





3. Add Rocephin 1 Gm IV  daily





4. Duoneb   nebs tid.





5. CBC,BMP,CXR  in am





6. PT evaluation  for   ambulation.





7. Continue  Lasix  20 mg daily.











NATA Grey MD Nov 9, 2017 20:42

## 2017-11-09 NOTE — RADRPT
EXAM DATE/TIME:  11/09/2017 04:41 

 

[facility] COMPARISON:     

CHEST SINGLE AP, November 08, 2017, 20:00.

 

                     

INDICATIONS :     

Shortness of breath, possible pulmonary disease.

                     

 

MEDICAL HISTORY :     

Chronic obstructive pulmonary disease.          

 

SURGICAL HISTORY :        

Thoracotomy

 

ENCOUNTER:     

Subsequent                                        

 

ACUITY:     

2 days      

 

PAIN SCORE:     

6/10

 

LOCATION:     

Left chest 

 

FINDINGS:     

The cardiac silhouette is enlarged in transverse diameter. There is left lower lobe atelectasis versu
s pneumonia. 2 left chest tubes are present. There is no  evidence of pneumothorax. The right lung is
 free of acute parenchymal opacity.

 

CONCLUSION:     

1. Cardiomegaly

2. Diffuse left-sided pneumonia. There has been no significant change when compared to the prior exam
.

 

 

 

 Bryn Monterroso MD on November 09, 2017 at 6:13           

Board Certified Radiologist.

 This report was verified electronically.

## 2017-11-09 NOTE — PD.CAR.PN
CVT Progress Note


Subjective/Hospital Course:


56-year-old white female with a history of COPD, chronic bronchitis who 

apparently was suffering from food poisoning.  The patient states that she was 

unable to pass urine and became very weak, had lost weight with diarrhea, 

abdominal discomfort, dehydration and presented to ER for evaluation. She c/o 

20 lb weight loss over the past 4 months. Chest CT done showed evidence of a 

loculated  mass-like infiltrate in the left lung base 12 x 12 cm and a left 

upper lobe lung nodule 7 mm.  The patient was started on Solu-Medrol, IV 

antibiotics including Rocephin and Zithromax.  Denies chest pains or abdominal 

pains or nausea or vomiting. She underwent chest tube placement in IR 10/26


with minimal drainage of this effusion.. Chest tube was removed. Repeat chest x-

ray shows no change in the mod pleural effusion but does shows some mild patchy 

opacities in the right lung base. 


cardiology Dr Sebastián de la torre pt   TTE shows preserved EF and at least moderate AS/AI.





present diagnosis : Respiratory Insufficiency, Left-sided consolidation , 

Exudative pleural effusion, COPD exacerbation, Urinary tract infection, x 

tobacco use


Anemia, thrombocytopenia, Moderate aortic valve regurgitation, Moderate 

calcific aortic valve stenosis





pleural fluid : exudative effusion. Cytology neg for malignant cells. Fluid cx 

neg 


CT chest 10/26 : Either highly complex fluid or soft tissue mass involving the 

left lung base with mass effect. This measures 12.2 x 12.6 x 7.4 cm.  4.4 cm 

ascending aortic aneurysm.





pt is now agreeable for surgery : plan is for left thoracoscopic exploration 

for loculated pleural effusion , possible  thoracotomy , decortication in am 





11/8


on nasal cannula 


surgery: Left thoracoscopic exploration, left thoracotomy for decortication, 

evacuation of complex loculated effusion, adhesiolysis





11/9


pain controlled with PCA morphine 


needs aggressive pulm toileting 


OOB, ambulate 


recheck UA / pleural fluid cultures and path pending


Objective:


GENERAL: 


SKIN: Warm and dry.incision intact left posterior chest wall / 


HEAD: Normocephalic.


EYES: No scleral icterus. No injection or drainage. 


NECK: Supple, trachea midline. No JVD or lymphadenopathy.


CARDIOVASCULAR: Regular rate and rhythm without murmurs, gallops, or rubs. 


RESPIRATORY: diminished left lower lobe 


chest tube to wall suction, no air leak / drained 600cc/ 12 hrs 


 No accessory muscle use.


GASTROINTESTINAL: Abdomen soft, non-tender, nondistended. 


MUSCULOSKELETAL: No cyanosis, or edema. 


BACK: Nontender without obvious deformity. No CVA tenderness.








Vital Signs








  Date Time  Temp Pulse Resp B/P (MAP) Pulse Ox O2 Delivery O2 Flow Rate FiO2


 


11/9/17 10:15     99 Nasal Cannula 2.00 


 


11/9/17 08:14   16     


 


11/9/17 07:30     100 Nasal Cannula 2.00 


 


11/9/17 07:30  88      


 


11/9/17 07:30 97.5 88 18 118/63 (81) 100   





    104/51 (68)    


 


11/9/17 06:00  81      


 


11/9/17 05:39   16     


 


11/9/17 05:00  78      


 


11/9/17 04:00  79      


 


11/9/17 03:20     100 Nasal Cannula 2.00 


 


11/9/17 03:20 97.4 95 16 110/69 (83) 100   





    108/58 (75)    


 


11/9/17 03:00  77      


 


11/9/17 02:00  78      


 


11/9/17 01:00  76      


 


11/9/17 00:00  72      


 


11/8/17 23:34   16     


 


11/8/17 23:00  72      


 


11/8/17 23:00 97.4 83 16 119/66 (83) 100   





    114/57 (76)    


 


11/8/17 23:00     100 Nasal Cannula 2.00 


 


11/8/17 22:30  72      


 


11/8/17 22:30  80 14 126/67 (86) 100   





    128/58 (81)    


 


11/8/17 21:30 97.3 72 12 116/65 (82) 100 Nasal Cannula 2 





    127/60 (82)    


 


11/8/17 21:26 97.3 77 15 127/59 100   


 


11/8/17 21:15  77 20 114/67 (83) 100 Nasal Cannula 2 





    124/60 (81)    


 


11/8/17 21:00 97.4 76 10 116/57 100   


 


11/8/17 21:00  69 9 105/63 (77) 100 Nasal Cannula 3 





    117/56 (76)    


 


11/8/17 20:46   8     


 


11/8/17 20:45  69 8 108/61 (77) 100 Nasal Cannula 3 





    114/54 (74)    


 


11/8/17 20:36 97.4 72 8 105/60 100   


 


11/8/17 20:30  74 19 105/60 (75) 100 Nasal Cannula 3 





    111/54 (73)    


 


11/8/17 20:15  79 35 106/57 (73) 100 Nasal Cannula 3 





    106/55 (72)    


 


11/8/17 20:00  97 34 108/68 (81) 100 Nasal Cannula 3 





    113/64 (80)    


 


11/8/17 19:52 97.4 103 24 117/72 (87) 91 Nasal Cannula 3 





        








Labs:





Laboratory Tests








Test


  11/9/17


04:50


 


White Blood Count


  7.6 TH/MM3


(4.0-11.0)


 


Red Blood Count


  2.33 MIL/MM3


(4.00-5.30)


 


Hemoglobin


  8.3 GM/DL


(11.6-15.3)


 


Hematocrit


  24.3 %


(35.0-46.0)


 


Mean Corpuscular Volume


  104.4 FL


(80.0-100.0)


 


Mean Corpuscular Hemoglobin


  35.5 PG


(27.0-34.0)


 


Mean Corpuscular Hemoglobin


Concent 34.0 %


(32.0-36.0)


 


Red Cell Distribution Width


  21.2 %


(11.6-17.2)


 


Platelet Count


  89 TH/MM3


(150-450)


 


Mean Platelet Volume


  9.0 FL


(7.0-11.0)


 


Neutrophils (%) (Auto)


  77.3 %


(16.0-70.0)


 


Lymphocytes (%) (Auto)


  11.2 %


(9.0-44.0)


 


Monocytes (%) (Auto)


  11.1 %


(0.0-8.0)


 


Eosinophils (%) (Auto)


  0.1 %


(0.0-4.0)


 


Basophils (%) (Auto)


  0.3 %


(0.0-2.0)


 


Neutrophils # (Auto)


  5.9 TH/MM3


(1.8-7.7)


 


Lymphocytes # (Auto)


  0.8 TH/MM3


(1.0-4.8)


 


Monocytes # (Auto)


  0.8 TH/MM3


(0-0.9)


 


Eosinophils # (Auto)


  0.0 TH/MM3


(0-0.4)


 


Basophils # (Auto)


  0.0 TH/MM3


(0-0.2)


 


CBC Comment AUTO DIFF 


 


Differential Comment


  AUTO DIFF


CONFIRMED


 


Platelet Estimate LOW (NORMAL) 


 


Platelet Morphology Comment


  NORMAL


(NORMAL)


 


Blood Urea Nitrogen


  12 MG/DL


(7-18)


 


Creatinine


  0.48 MG/DL


(0.50-1.00)


 


Random Glucose


  90 MG/DL


()


 


Total Protein


  4.3 GM/DL


(6.4-8.2)


 


Calcium Level


  7.4 MG/DL


(8.5-10.1)


 


Sodium Level


  138 MEQ/L


(136-145)


 


Potassium Level


  3.4 MEQ/L


(3.5-5.1)


 


Chloride Level


  102 MEQ/L


()


 


Carbon Dioxide Level


  29.0 MEQ/L


(21.0-32.0)


 


Anion Gap 7 MEQ/L (5-15) 


 


Estimat Glomerular Filtration


Rate 134 ML/MIN


(>89)


 


Protein Corrected Calcium


  9.0 MG/DL


(8.5-10.1)








Result Diagram:  


11/9/17 0450 11/9/17 0450





Telemetry:


NSR





(1) COPD (chronic obstructive pulmonary disease)


Plan:   


IMPRESSION


1.  COPD with acute exacerbation


2.  Chronic left basilar effusion with loculation


3.  Atelectasis left lower lobe with mucus plugging.


4.  Hyponatremia.


5.  Abnormal liver enzymes


6.  Anemia and chronic disease.


7. Aortic Stenosis/AI


8. Soft tissue mass involving the left lung base with mass effect.  


9. 4.4 cm ascending aortic aneurysm.


10. Deconditioned








s/p 


left thoracoscopic exploration for loculated pleural effusion , possible 

thoracotomy decortication 


pulm toileting 





pain control 


GI motility meds





OOB/ ambulate 





(2) Tobacco abuse


Plan:  smoking cessation





(3) Physical deconditioning


Plan:  pt 





(4) Pleural effusion on left


Plan:  for left thoracoscopic exploration for loculated pleural effusion in am 





(5) Hyponatremia


(6) Hypochloremia





Problem Qualifiers





(1) COPD (chronic obstructive pulmonary disease):  


Qualified Codes:  J43.1 - Panlobular emphysema








Terwilliger,Jacqueline R. ARNP Nov 9, 2017 15:41

## 2017-11-10 NOTE — HHI.PR
Subjective


Remarks


in no acute distress.


still with some pain at the site of chest pain- still on PCA.


had urinary retention and currently on intermittent catheterization.


d/w the RN.





Objective


Vitals





Vital Signs








  Date Time  Temp Pulse Resp B/P (MAP) Pulse Ox O2 Delivery O2 Flow Rate FiO2


 


11/10/17 07:30 98.9 94 16 99/57 (71) 93   


 


11/10/17 07:30  94      


 


11/10/17 07:10     95   21


 


11/10/17 06:00  94      


 


11/10/17 06:00   16     


 


11/10/17 05:00  92      


 


11/10/17 04:00  90      


 


11/10/17 03:00 98.4 92 18 112/61 (78) 93   


 


11/10/17 03:00     93 Room Air  


 


11/10/17 03:00  89      


 


11/10/17 02:00  81      


 


11/10/17 01:25   16     


 


11/10/17 01:00  89      


 


11/10/17 00:00  89      


 


11/9/17 23:00  88      


 


11/9/17 23:00     98 Room Air  


 


11/9/17 23:00 98.3 87 18 112/63 (79) 98   


 


11/9/17 22:00  85      


 


11/9/17 22:00   16     


 


11/9/17 21:00  94      


 


11/9/17 20:00  90      


 


11/9/17 19:20 97.9 90 18 107/62 (77) 93   





    Arterial Line    


 


11/9/17 19:20     93 Room Air  


 


11/9/17 19:00  98      


 


11/9/17 16:23     93 Nasal Cannula 2.00 


 


11/9/17 16:23 97.9 99 18 100/62 (75) 93   





        


 


11/9/17 14:00   16     


 


11/9/17 11:30     100 Nasal Cannula 2.00 


 


11/9/17 11:30 98.0 98 18 99/69 (79) 97   





        














I/O      


 


 11/9/17 11/9/17 11/9/17 11/10/17 11/10/17 11/10/17





 07:00 15:00 23:00 07:00 15:00 23:00


 


Intake Total 490 ml  1709 ml 829 ml  


 


Output Total 500 ml  390 ml 1330 ml  


 


Balance -10 ml  1319 ml -501 ml  


 


      


 


Intake Oral 200 ml  1000 ml 480 ml  


 


IV Total 290 ml  709 ml 349 ml  


 


Output Urine Total 200 ml  0 ml 1200 ml  


 


Chest Tube Drainage Total 300 ml  390 ml 130 ml  


 


Bladder Scan Volume Amount 160 ml   427 ml  


 


# Bowel Movements 0  0 0  








Result Diagram:  


11/10/17 0925                                                                  

              11/10/17 0925





Imaging





Last Impressions








Chest X-Ray 11/9/17 0500 Signed





Impressions: 





 Service Date/Time:  Thursday, November 9, 2017 04:41 - CONCLUSION:  1. 





 Cardiomegaly 2. Diffuse left-sided pneumonia. There has been no significant 





 change when compared to the prior exam.     Bryn Monterroso MD 


 


Chest CT 10/28/17 0000 Signed





Impressions: 





 Service Date/Time:  Saturday, October 28, 2017 14:44 - CONCLUSION:  1. 

Interval 





 placement of left-sided chest tube with slight decrease in the complex 





 low-density fluid collection in the lower left hemithorax. 2. Volume loss 





 remains in the left hemithorax with consolidation in the left infrahilar 

region 





 3. New small right pleural effusion and consolidation in the right posterior 





 lower lobe 4. The previously noted fluid and debris in the left mainstem 





 bronchus is no longer present.     Ibrahima Mccarthy MD 


 


Chest Ultrasound 10/27/17 0000 Signed





Impressions: 





 Service Date/Time:  Friday, October 27, 2017 09:59 - CONCLUSION:  No focal 





 collections of anechoic free fluid.  Prominent amount of heterogeneous 





 echotexture material surrounding the left lower chest.     Efrain Samuels MD 


 


Chest Tube Insertion 10/26/17 1224 Signed





Impressions: 





 Service Date/Time:  Thursday, October 26, 2017 13:22 - CONCLUSION: 

Uncomplicated 





 CT-guided thoracentesis.     Jorge Carrillo MD 








Objective Remarks


GENERAL: This is a well-nourished, well-developed patient, in no apparent 

distress.


CARDIOVASCULAR: Regular rate and regular rhythm without murmurs, gallops, or 

rubs. 


RESPIRATORY: Clear to auscultation. Breath sounds equal bilaterally. No wheezes

, rales, or rhonchi.  


   chest tube in place.


GASTROINTESTINAL: Abdomen soft, non-tender, nondistended. 


Normal, active bowel sounds


MUSCULOSKELETAL: Extremities without clubbing, cyanosis, or edema.


NEURO:  Alert & Oriented x4 to person, place, time, situation.  Moves all ext x4


Procedures


CT guided thoracentesis with L chest tube placement 10/26


Left thoracoscopic exploration, left thoracotomy for decortication, evacuation 

of complex loculated effusion, adhesiolysis 11/8/17


Medications and IVs





Current Medications


Sodium Chloride (NS Flush) 2 ml UNSCH  PRN IVF FLUSH AFTER USING IV ACCESS Last 

administered on 11/5/17at 21:04;  Start 10/26/17 at 10:15;  Stop 11/7/17 at 17:

47;  Status DC


Methylprednisolone Sodium Succinate (SoluMEDROL INJ) 125 mg ONCE  ONCE IV PUSH  

Last administered on 10/26/17at 10:17;  Start 10/26/17 at 10:15;  Stop 10/26/17 

at 10:16;  Status DC


Albuterol/ Ipratropium (Duoneb Neb) 1 ampule ONCE  ONCE INH  Last administered 

on 10/26/17at 10:13;  Start 10/26/17 at 10:15;  Stop 10/26/17 at 10:16;  Status 

DC


Albuterol Sulfate (Albuterol Neb) 2.5 mg Q15M INH  Last administered on 10/26/

17at 10:13;  Start 10/26/17 at 10:15;  Stop 10/26/17 at 10:31;  Status DC


Sodium Chloride 1,000 ml @  125 mls/hr Q8H IV  Last administered on 10/26/17at 

10:17;  Start 10/26/17 at 10:15;  Stop 10/26/17 at 11:35;  Status DC


Ondansetron HCl (Zofran Inj) 4 mg ONCE  ONCE IV PUSH  Last administered on 10/26

/17at 10:41;  Start 10/26/17 at 10:45;  Stop 10/26/17 at 10:46;  Status DC


Ceftriaxone Sodium 1000 mg/ Sodium Chloride 100 ml @  200 mls/hr ONCE  ONCE IV  

Last administered on 10/26/17at 11:54;  Start 10/26/17 at 11:15;  Stop 10/26/17 

at 11:44;  Status DC


Azithromycin 500 mg/Sodium Chloride 250 ml @  250 mls/hr ONCE  ONCE IV  Last 

administered on 10/26/17at 12:31;  Start 10/26/17 at 11:15;  Stop 10/26/17 at 12

:14;  Status DC


Pantoprazole Sodium (Protonix Inj) 40 mg DAILY IV PUSH  Last administered on 10/

28/17at 08:54;  Start 10/26/17 at 12:00;  Stop 10/28/17 at 14:05;  Status DC


Albuterol/ Ipratropium (Duoneb Neb) 1 ampule Q4HR  NEB INH  Last administered 

on 10/30/17at 07:58;  Start 10/26/17 at 12:00;  Stop 10/30/17 at 11:59;  Status 

DC


Albuterol/ Ipratropium (Duoneb Neb) 1 ampule Q2HR NEB  PRN INH WHEEZING Last 

administered on 11/1/17at 10:14;  Start 10/26/17 at 11:30;  Stop 11/8/17 at 20:

29;  Status DC


Miscellaneous Information 1 [id number]  Last administered on 10/26/17at 21:22;  

Start 10/26/17 at 11:30;  Stop 11/4/17 at 23:35;  Status DC


Chlorhexidine Gluconate (Chlorhexidine 2% Cloth) Taper DAILY@04 TOP  Last 

administered on 10/29/17at 04:00;  Start 10/27/17 at 04:00;  Stop 11/4/17 at 23:

35;  Status DC


Chlorhexidine Gluconate (Chlorhexidine 2% Cloth) 3 pack UNSCH  PRN TOP HYGIENIC 

CARE;  Start 10/26/17 at 11:30;  Stop 11/4/17 at 23:35;  Status DC


Senna/Docusate Sodium (Jess-Colace) 1 tab BID PO  Last administered on 11/8/ 17at 08:44;  Start 10/26/17 at 21:00;  Stop 11/8/17 at 20:29;  Status DC


Magnesium Hydroxide (Milk Of Magnesia Liq) 30 ml Q12H  PRN PO Mild constipation 

Last administered on 11/6/17at 20:33;  Start 10/26/17 at 11:30;  Stop 11/8/17 

at 20:29;  Status DC


Sennosides (Senokot) 17.2 mg Q12H  PRN PO Moderate constipation Last 

administered on 11/7/17at 17:10;  Start 10/26/17 at 11:30


Bisacodyl (Dulcolax Supp) 10 mg DAILY  PRN RECTAL SEVERE CONSITIPATION;  Start 

10/26/17 at 11:30


Lactulose (Lactulose Liq) 30 ml DAILY  PRN PO SEVERE CONSITIPATION;  Start 10/26

/17 at 11:30;  Status Cancel


Sodium Chloride 1,000 ml @  84 mls/hr [id number] IV  Last administered on 10/26/

17at 17:26;  Start 10/26/17 at 11:30;  Stop 10/27/17 at 08:37;  Status DC


Potassium Chloride 100 ml @  50 mls/hr Q2H  PRN IV For Potassium 2.8 - 3.2 mEq/L

;  Start 10/26/17 at 11:30;  Stop 10/28/17 at 14:05;  Status DC


Potassium Chloride 100 ml @  50 mls/hr Q2H  PRN IV For Potassium 2.8 - 3.2 mEq/L

;  Start 10/26/17 at 11:30;  Stop 10/28/17 at 14:05;  Status DC


Potassium Bicarb/ Potassium Chloride (K-Lyte Cl  Eff) 50 meq UNSCH  PRN PO For 

Potassium 3.3 - 3.5 mEq/L;  Start 10/26/17 at 11:30;  Stop 10/28/17 at 14:05;  

Status DC


Potassium Chloride 100 ml @  25 mls/hr UNSCH  PRN IV For Potassium 3.3 - 3.5 mEq

/L;  Start 10/26/17 at 11:30;  Stop 10/28/17 at 14:05;  Status DC


Potassium Chloride 100 ml @  50 mls/hr Q2H  PRN IV For Potassium 3.3 - 3.5 mEq/L

;  Start 10/26/17 at 11:30;  Stop 10/28/17 at 14:05;  Status DC


Magnesium Sulfate 4 gm/Sodium Chloride 100 ml @  50 mls/hr UNSCH  PRN IV For 

Magnesium 0.9 - 1.1 mg/dL;  Start 10/26/17 at 11:30;  Stop 10/28/17 at 14:05;  

Status DC


Magnesium Oxide (Mag-Ox) 800 mg UNSCH  PRN PO For Magnesium 1.2 - 1.6 mg/dL;  

Start 10/26/17 at 11:30;  Stop 10/28/17 at 14:05;  Status DC


Magnesium Sulfate 2 gm/Sodium Chloride 100 ml @  50 mls/hr UNSCH  PRN IV For 

Magnesium 1.2 - 1.6 mg/dL;  Start 10/26/17 at 11:30;  Stop 10/28/17 at 14:05;  

Status DC


Potassium Phosphate (K-Phos) 2,000 mg Q4H  PRN PO For Phosphorus < 2.5 mg/dL;  

Start 10/26/17 at 11:30;  Stop 10/28/17 at 14:05;  Status DC


Sodium Phosphate 30 mmol/Sodium Chloride 250 ml @  42 mls/hr UNSCH  PRN IV For 

Phosphorus < 2.5 mg/dL;  Start 10/26/17 at 11:30;  Stop 10/28/17 at 14:05;  

Status DC


Potassium Phosphate (K-Phos) 2,000 mg UNSCH  PRN PO/TUBE SEE LABEL COMMENTS;  

Start 10/26/17 at 11:30;  Stop 10/28/17 at 14:05;  Status DC


Potassium Phosphate 30 mmol/ Sodium Chloride 260 ml @  42 mls/hr UNSCH  PRN IV 

SEE LABEL COMMENTS Last administered on 10/27/17at 11:46;  Start 10/26/17 at 11:

30;  Stop 10/28/17 at 14:05;  Status DC


Dextrose (D50w (Vial) Inj) 50 ml UNSCH  PRN IV PUSH HYPOGLYCEMIA-SEE COMMENTS;  

Start 10/26/17 at 11:30;  Stop 11/4/17 at 23:34;  Status DC


Glucagon (Glucagon Inj) 1 mg UNSCH  PRN OTHER HYPOGLYCEMIA-SEE COMMENTS;  Start 

10/26/17 at 11:30;  Stop 11/4/17 at 23:34;  Status DC


Insulin Human Regular (NovoLIN R SUPPLEMENTAL SCALE) 1 Q6H SQ  Last 

administered on 10/29/17at 17:30;  Start 10/26/17 at 11:30;  Stop 11/4/17 at 23:

34;  Status DC


Ceftriaxone Sodium 1000 mg/ Sodium Chloride 100 ml @  200 mls/hr Q24H IV  Last 

administered on 10/31/17at 12:20;  Start 10/27/17 at 12:00;  Stop 11/1/17 at 11:

59;  Status DC


Azithromycin 500 mg/Sodium Chloride 250 ml @  250 mls/hr Q24H IV  Last 

administered on 10/28/17at 11:48;  Start 10/27/17 at 13:00;  Stop 10/28/17 at 15

:20;  Status DC


Methylprednisolone Sodium Succinate (SoluMEDROL INJ) 60 mg Q6HR IV PUSH  Last 

administered on 10/27/17at 12:31;  Start 10/26/17 at 16:00;  Stop 10/27/17 at 13

:03;  Status DC


Lidocaine/ Epinephrine (Xylocaine-Epi 1%-1:100,000 Inj) 20 ml STK-MED ONCE 

.ROUTE  Last administered on 10/26/17at 12:38;  Start 10/26/17 at 12:38;  Stop 

10/26/17 at 12:39;  Status DC


Fentanyl Citrate (fentaNYL INJ) 100 mcg STK-MED ONCE .ROUTE  Last administered 

on 10/26/17at 12:47;  Start 10/26/17 at 12:47;  Stop 10/26/17 at 12:48;  Status 

DC


Midazolam HCl (Versed Inj) 2 mg STK-MED ONCE .ROUTE  Last administered on 10/26/

17at 12:47;  Start 10/26/17 at 12:47;  Stop 10/26/17 at 12:48;  Status DC


Iohexol (Omnipaque 350 Inj) 69 ml STK-MED ONCE IVCONTRAST  Last administered on 

10/26/17at 13:33;  Start 10/26/17 at 13:33;  Stop 10/26/17 at 13:34;  Status DC


Ephedrine Sulfate (ePHEDrine/NS 25 MG/5 ML SYR) 25 mg STK-MED ONCE .ROUTE ;  

Start 10/26/17 at 13:40;  Stop 10/26/17 at 13:41;  Status DC


Acetaminophen (Tylenol) 650 mg Q6H  PRN PO PAIN Last administered on 10/27/17at 

04:13;  Start 10/27/17 at 04:15;  Stop 10/30/17 at 11:16;  Status DC


Oxycodone/ Acetaminophen (Percocet  5-325 Mg) 1 tab ONCE  ONCE PO  Last 

administered on 10/27/17at 09:39;  Start 10/27/17 at 09:30;  Stop 10/27/17 at 09

:31;  Status DC


Methylprednisolone Sodium Succinate (SoluMEDROL INJ) 40 mg Q6HR IV PUSH  Last 

administered on 10/29/17at 05:12;  Start 10/27/17 at 18:00;  Stop 10/29/17 at 11

:40;  Status DC


Morphine Sulfate (Morphine Inj) 1 mg ONCE  ONCE IV PUSH  Last administered on 10

/27/17at 15:06;  Start 10/27/17 at 15:00;  Stop 10/27/17 at 15:01;  Status DC


Acetaminophen (Tylenol) 650 mg Q6H  PRN PO PAIN 1-2;  Start 10/27/17 at 22:30


Morphine Sulfate (Morphine Inj) 1 mg Q4H  PRN IV PAIN 5-10 Last administered on 

10/28/17at 11:12;  Start 10/27/17 at 22:30;  Stop 10/28/17 at 13:46;  Status DC


Morphine Sulfate (Morphine Inj) 2 mg Q4H  PRN IV BREAKTHROUGH PAIN 6-10 Last 

administered on 11/8/17at 13:33;  Start 10/28/17 at 14:30;  Stop 11/8/17 at 19:

27;  Status DC


Acetaminophen/ Hydrocodone Bitart (Norco  5-325 Mg) 1 tab Q6H  PRN PO PAIN 3-5 

Last administered on 10/30/17at 05:18;  Start 10/28/17 at 13:45;  Stop 11/6/17 

at 09:01;  Status DC


Acetaminophen/ Hydrocodone Bitart (Norco  5-325 Mg) 2 tab Q6H  PRN PO PAIN 6-10 

Last administered on 11/6/17at 08:39;  Start 10/28/17 at 13:45;  Stop 11/6/17 

at 09:01;  Status DC


Pantoprazole Sodium (Protonix) 40 mg DAILY PO  Last administered on 11/8/17at 08

:41;  Start 10/29/17 at 09:00;  Stop 11/8/17 at 20:29;  Status DC


Iohexol (Omnipaque 350 Inj) 70 ml STK-MED ONCE IVCONTRAST  Last administered on 

10/28/17at 14:59;  Start 10/28/17 at 14:59;  Stop 10/28/17 at 15:00;  Status DC


Azithromycin 500 mg/Sodium Chloride 250 ml @  250 mls/hr Q24H IV  Last 

administered on 11/1/17at 11:57;  Start 10/30/17 at 13:00;  Stop 11/1/17 at 14:

46;  Status DC


Methylprednisolone Sodium Succinate (SoluMEDROL INJ) 40 mg Q12HR IV PUSH  Last 

administered on 10/31/17at 07:37;  Start 10/29/17 at 21:00;  Stop 10/31/17 at 13

:51;  Status DC


Methylprednisolone Sodium Succinate (SoluMEDROL INJ) 40 mg DAILY IV PUSH  Last 

administered on 11/2/17at 08:15;  Start 11/1/17 at 09:00;  Stop 11/2/17 at 18:19

;  Status DC


Nifedipine (Procardia Xl) 60 mg ONCE  ONCE PO  Last administered on 11/2/17at 17

:49;  Start 11/2/17 at 17:30;  Stop 11/2/17 at 17:32;  Status DC


Nifedipine (Procardia Xl) 30 mg DAILY PO  Last administered on 11/10/17at 08:03

;  Start 11/3/17 at 09:00


Furosemide (Lasix) 20 mg DAILY PO  Last administered on 11/10/17at 08:03;  

Start 11/2/17 at 17:35


Prednisone (Deltasone) 10 mg DAILY PO  Last administered on 11/10/17at 08:04;  

Start 11/5/17 at 09:00


Acetaminophen/ Hydrocodone Bitart (Norco  5-325 Mg) 1 tab Q4H  PRN PO PAIN 3-5;

  Start 11/6/17 at 09:00;  Stop 11/8/17 at 19:27;  Status DC


Acetaminophen/ Hydrocodone Bitart (Norco  5-325 Mg) 2 tab Q4H  PRN PO PAIN 6-10 

Last administered on 11/8/17at 08:47;  Start 11/6/17 at 09:15;  Stop 11/8/17 at 

19:27;  Status DC


Lactulose (Lactulose Liq) 30 ml ONCE  ONCE PO ;  Start 11/7/17 at 13:00;  Stop 

11/7/17 at 13:19;  Status DC


Lactulose (Lactulose Liq) 30 ml QID  PRN PO severe constipation  Last 

administered on 11/8/17at 08:42;  Start 11/7/17 at 13:00


Simethicone (Phazyme Chew) 125 mg ONCE  ONCE PO  Last administered on 11/7/17at 

14:59;  Start 11/7/17 at 13:30;  Stop 11/7/17 at 13:31;  Status DC


Sodium Chloride (NS Flush) 2 ml BID IV FLUSH  Last administered on 11/8/17at 09:

00;  Start 11/7/17 at 21:00;  Stop 11/8/17 at 20:29;  Status DC


Sodium Chloride (NS Flush) 2 ml UNSCH  PRN IV FLUSH FLUSH AFTER USING IV ACCESS

;  Start 11/7/17 at 16:15;  Stop 11/8/17 at 20:29;  Status DC


Cefazolin Sodium 500 mg/Sodium Chloride 505 ml @ 0 mls/hr ON  CALL IRRIGATION ;

  Start 11/7/17 at 16:15;  Stop 11/14/17 at 16:14


Cefazolin Sodium/ Dextrose 50 ml @  150 mls/hr ON  CALL IV  Last administered 

on 11/8/17at 16:17;  Start 11/7/17 at 16:15;  Stop 11/14/17 at 16:14


Chlorhexidine Gluconate (Hibiclens 4% Top Soln) 1 applic ON  CALL TOPICAL  Last 

administered on 11/8/17at 08:42;  Start 11/7/17 at 16:15;  Stop 11/9/17 at 08:40

;  Status DC


Dextrose (D50w (Vial) Inj) 50 ml UNSCH  PRN IV PUSH HYPOGLYCEMIA-SEE COMMENTS;  

Start 11/7/17 at 16:15


Lactated Ringer's 1,000 ml @  30 mls/hr Q24H PRN IV SEE LABEL COMMENTS Last 

administered on 11/8/17at 14:53;  Start 11/7/17 at 18:30;  Stop 11/8/17 at 20:23

;  Status DC


Sodium Chloride 500 ml @  30 mls/hr [id number] PRN IV SEE LABEL COMMENTS;  Start 11 /7/17 at 18:30;  Stop 11/8/17 at 20:23;  Status DC


Metoprolol Tartrate (Lopressor) 25 mg ON CALL  PRN PO SEE LABEL COMMENTS;  

Start 11/7/17 at 18:30;  Stop 11/8/17 at 20:23;  Status DC


Povidone Iodine (Betadine 5% Antisepsis Kit) 1 applic ON CALL  PRN EACH NARE 

SEE LABEL COMMENTS;  Start 11/7/17 at 18:30;  Stop 11/8/17 at 20:23;  Status DC


Chlorhexidine Gluconate (Chlorhexidine 2% Cloth) 3 pack ON CALL  PRN TOPICAL 

SEE LABEL COMMENTS;  Start 11/7/17 at 18:30;  Stop 11/9/17 at 08:40;  Status DC


Insulin Human Regular (NovoLIN R INJ) See Protocol Table ... ON CALL  PRN SQ 

SEE PROTOCOL TABLE;  Start 11/7/17 at 18:30;  Stop 11/8/17 at 20:23;  Status DC


Albuterol/ Ipratropium (Duoneb Neb) 1 ampule Q6HR  NEB NEB  Last administered 

on 11/8/17at 09:40;  Start 11/7/17 at 22:00;  Stop 11/8/17 at 20:29;  Status DC


Bupivacaine HCl (Marcaine Pf 0.5% Inj) 30 ml ONCE  ONCE INFIL  Last 

administered on 11/8/17at 17:51;  Start 11/8/17 at 17:51;  Stop 11/8/17 at 17:53

;  Status DC


Albuterol Sulfate (Albuterol Neb) 2.5 mg Q6HR  NEB NEB  Last administered on 11/

10/17at 07:08;  Start 11/8/17 at 22:00


Albuterol Sulfate (Albuterol Neb) 2.5 mg Q2HR NEB  PRN NEB WHEEZING;  Start 11/8 /17 at 19:30


IV Flush (NS Flush) 2 ml BID IV FLUSH  Last administered on 11/9/17at 20:13;  

Start 11/8/17 at 21:00


IV Flush (NS Flush) 2 ml UNSCH  PRN IV FLUSH FLUSH AFTER USING IV ACCESS;  

Start 11/8/17 at 19:30


Miscellaneous Information (Post-op Orders (for Pharmacy))  STAT  ONCE OTHER ;  

Start 11/8/17 at 19:30;  Stop 11/8/17 at 20:27;  Status DC


Pantoprazole Sodium (Protonix) 40 mg HS PO  Last administered on 11/9/17at 20:13

;  Start 11/8/17 at 21:00


Ondansetron HCl (Zofran Inj) 4 mg Q6H  PRN IV PUSH NAUSEA OR VOMITING;  Start 11 /8/17 at 19:30


Docusate Calcium (Surfak) 240 mg HS PO  Last administered on 11/8/17at 23:35;  

Start 11/8/17 at 21:00;  Stop 11/9/17 at 08:40;  Status DC


Magnesium Hydroxide (Milk Of Magnesia Liq) 30 ml DAILY  PRN PO MILD CONSTIPATION

;  Start 11/8/17 at 19:30


Acetaminophen 100 ml @  400 mls/hr Q6H IV  Last administered on 11/9/17at 19:24

;  Start 11/8/17 at 21:00;  Stop 11/9/17 at 15:14;  Status DC


Naloxone HCl (Narcan Inj) 0.4 mg UNSCH  PRN IV PUSH RESPIRATORY RATE LESS THAN 

10;  Start 11/8/17 at 19:30


Morphine Sulfate (Morphine 1 Mg/ ml PCA) 30 mg UNSCH IV  Last administered on 11

/10/17at 01:25;  Start 11/8/17 at 19:30


PCA Dosage Infused (Pha) 1 Q8HR .XX  Last administered on 11/10/17at 06:00;  

Start 11/8/17 at 22:00


Morphine Sulfate (*morphine INJ PERIprocedure ONLY) 8 mg STK-MED ONCE .ROUTE  

Last administered on 11/8/17at 20:10;  Start 11/8/17 at 20:08;  Stop 11/9/17 at 

08:40;  Status DC


Morphine Sulfate (*morphine INJ PERIprocedure ONLY) 8 mg STK-MED ONCE .ROUTE  

Last administered on 11/8/17at 20:30;  Start 11/8/17 at 20:22;  Stop 11/9/17 at 

08:40;  Status DC


Meperidine HCl (*DEMEROL INJ PERIprocedural ONLY) 25 mg STK-MED ONCE .ROUTE  

Last administered on 11/8/17at 20:23;  Start 11/8/17 at 20:22;  Stop 11/9/17 at 

08:40;  Status DC


Morphine Sulfate (Morphine 1 Mg/ ml PCA) 30 mg STK-MED ONCE .ROUTE ;  Start 11/8 /17 at 20:25;  Stop 11/8/17 at 20:26;  Status DC


Sodium Chloride 1,000 ml @  30 mls/hr Q24H IV  Last administered on 11/9/17at 20

:13;  Start 11/8/17 at 21:00


Miscellaneous Information ALL NURSING DEPARTME... UNSCH  PRN .XX SEE LABEL 

COMMENTS;  Start 11/8/17 at 19:55;  Stop 11/9/17 at 19:54;  Status DC


Polyethylene Glycol (Miralax) 17 gm DAILY PO  Last administered on 11/10/17at 08

:05;  Start 11/9/17 at 09:00


Potassium Chloride (KCl) 30 meq ONCE  ONCE PO  Last administered on 11/9/17at 10

:07;  Start 11/9/17 at 08:45;  Stop 11/9/17 at 08:46;  Status DC


Potassium Chloride (KCl) 10 meq DAILY PO  Last administered on 11/10/17at 08:04

;  Start 11/9/17 at 09:00


Cefazolin Sodium 1000 mg/Sodium Chloride 100 ml @  200 mls/hr Q8H IV ;  Start 11 /9/17 at 15:45;  Stop 11/9/17 at 15:45;  Status DC


Cefazolin Sodium 1000 mg/Sodium Chloride 100 ml @  200 mls/hr Q8H IV  Last 

administered on 11/10/17at 08:04;  Start 11/9/17 at 16:00;  Stop 11/10/17 at 08:

19;  Status DC


Ceftriaxone Sodium 1000 mg/ Sodium Chloride 100 ml @  200 mls/hr Q24H IV  Last 

administered on 11/9/17at 22:02;  Start 11/9/17 at 23:00


Furosemide (Lasix Inj) 20 mg ONCE  ONCE IV PUSH ;  Start 11/10/17 at 14:00;  

Stop 11/10/17 at 14:01


Potassium Chloride (KCl) 20 meq ONCE  ONCE PO ;  Start 11/10/17 at 14:00;  Stop 

11/10/17 at 14:01





A/P


Problem List:  


(1) Pleural effusion on left


ICD Code:  J90 - Pleural effusion, not elsewhere classified


Status:  Acute


(2) Respiratory distress


ICD Code:  R06.00 - Dyspnea, unspecified


Status:  Resolved


(3) Hyponatremia


ICD Code:  E87.1 - Hypo-osmolality and hyponatremia


Status:  Acute


(4) COPD (chronic obstructive pulmonary disease)


ICD Code:  J44.9 - Chronic obstructive pulmonary disease, unspecified


Status:  Chronic


(5) Tobacco abuse


ICD Code:  Z72.0 - Tobacco use


Status:  Chronic


Assessment and Plan


A/P








- left pleural effusion


     s/p CT guided left-sided thoracentesis and CT placement by IR 10/26. 


     s/p Left thoracoscopic exploration, left thoracotomy for decortication, 

evacuation of complex loculated effusion, adhesiolysis- 11/8/17.


    Continue with oxygen maintain sats above 92%.


    Bronchodilators, on Prednisone 10 mg daily -  Dr. Que hayward. 


   Exudative effusion. Cytology neg for malignant cells. Fluid cx neg x 72hours.


   CT chest 10/26 : Either highly complex fluid or soft tissue mass involving 

the left lung base with mass effect. This measures 12.2 x 12.6 x 7.4 cm.  4.4 

cm ascending aortic aneurysm.


  continue antibiotic.


  continue pain control- continue PCA today and will switch to oral pain meds 

tomorrow if pain is better.


-Echocardiogram from 10/27.  Says ejection fraction within normal limits, 

however expect calculation skewed by regurgitation


- on afterload reduction with nifedipine.  on Lasix.  Cardiology ff


-Continue treatment for COPD exacerbation as above.Seen by cardiology Dr Lowery. 

Patient refuses, any time of work up for AS/AI at this time. EF preserved. Thus 

continue medical management with diuresis , daily weight, low salt diet, BP 

control and aggressive medical management for CAD. Once patient decides to 

undergo further evaluation she can follow with Cardiology as an outpatient. 


-mild hypokalemia; replaced.


anemia of chronic disease- will monitor.


urinary retention; on intermittent catheterization.





continue PT.


Discharge Planning


no benefit for rehab or C.


needs aggressive PT.





Problem Qualifiers





(1) COPD (chronic obstructive pulmonary disease):  


Qualified Codes:  J43.1 - Panlobular emphysema








Isabel Ferreira MD Nov 10, 2017 11:24

## 2017-11-10 NOTE — PD.CAR.PN
CVT Progress Note


Subjective/Hospital Course:


56-year-old white female with a history of COPD, chronic bronchitis who 

apparently was suffering from food poisoning.  The patient states that she was 

unable to pass urine and became very weak, had lost weight with diarrhea, 

abdominal discomfort, dehydration and presented to ER for evaluation. She c/o 

20 lb weight loss over the past 4 months. Chest CT done showed evidence of a 

loculated  mass-like infiltrate in the left lung base 12 x 12 cm and a left 

upper lobe lung nodule 7 mm.  The patient was started on Solu-Medrol, IV 

antibiotics including Rocephin and Zithromax.  Denies chest pains or abdominal 

pains or nausea or vomiting. She underwent chest tube placement in IR 10/26


with minimal drainage of this effusion.. Chest tube was removed. Repeat chest x-

ray shows no change in the mod pleural effusion but does shows some mild patchy 

opacities in the right lung base. 


cardiology Dr Sebastián de la torre pt   TTE shows preserved EF and at least moderate AS/AI.





present diagnosis : Respiratory Insufficiency, Left-sided consolidation , 

Exudative pleural effusion, COPD exacerbation, Urinary tract infection, x 

tobacco use


Anemia, thrombocytopenia, Moderate aortic valve regurgitation, Moderate 

calcific aortic valve stenosis





pleural fluid : exudative effusion. Cytology neg for malignant cells. Fluid cx 

neg 


CT chest 10/26 : Either highly complex fluid or soft tissue mass involving the 

left lung base with mass effect. This measures 12.2 x 12.6 x 7.4 cm.  4.4 cm 

ascending aortic aneurysm.





pt is now agreeable for surgery : plan is for left thoracoscopic exploration 

for loculated pleural effusion , possible  thoracotomy , decortication in am 





11/8


on nasal cannula 


surgery: Left thoracoscopic exploration, left thoracotomy for decortication, 

evacuation of complex loculated effusion, adhesiolysis





11/9


pain controlled with PCA morphine 


needs aggressive pulm toileting 


OOB, ambulate 


recheck UA / pleural fluid cultures and path pending 





11/10


 chest tubes drained 130cc/ 12 hrs serous drainage / leave chest tube in for 

now 


CXR noted , from 11/9, mucus plugging, aggressive pulm toileting 


add ezpap and acapella 


now on Rocephin, will dc ancef, await on UA culture 





OOB, ambulate,


Objective:


GENERAL: 


SKIN: Warm and dry. incision intact left posterior chest wall


HEAD: Normocephalic.


EYES: No scleral icterus. No injection or drainage. 


NECK: Supple, trachea midline. No JVD or lymphadenopathy.


CARDIOVASCULAR: Regular rate and rhythm without murmurs, gallops, or rubs. 


RESPIRATORY: diminished left lower lobe 


chest tube to wall suction , no air leak 


. No accessory muscle use.


GASTROINTESTINAL: Abdomen soft, non-tender, nondistended. 


MUSCULOSKELETAL: No cyanosis, or edema. 


BACK: Nontender without obvious deformity. No CVA tenderness.








Vital Signs








  Date Time  Temp Pulse Resp B/P (MAP) Pulse Ox O2 Delivery O2 Flow Rate FiO2


 


11/10/17 07:10     95   21


 


11/10/17 06:00  94      


 


11/10/17 06:00   16     


 


11/10/17 05:00  92      


 


11/10/17 04:00  90      


 


11/10/17 03:00 98.4 92 18 112/61 (78) 93   


 


11/10/17 03:00     93 Room Air  


 


11/10/17 03:00  89      


 


11/10/17 02:00  81      


 


11/10/17 01:25   16     


 


11/10/17 01:00  89      


 


11/10/17 00:00  89      


 


11/9/17 23:00  88      


 


11/9/17 23:00     98 Room Air  


 


11/9/17 23:00 98.3 87 18 112/63 (79) 98   


 


11/9/17 22:00  85      


 


11/9/17 22:00   16     


 


11/9/17 21:00  94      


 


11/9/17 20:00  90      


 


11/9/17 19:20 97.9 90 18 107/62 (77) 93   





    Arterial Line    


 


11/9/17 19:20     93 Room Air  


 


11/9/17 19:00  98      


 


11/9/17 16:23     93 Nasal Cannula 2.00 


 


11/9/17 16:23 97.9 99 18 100/62 (75) 93   





        


 


11/9/17 14:00   16     


 


11/9/17 11:30     100 Nasal Cannula 2.00 


 


11/9/17 11:30 98.0 98 18 99/69 (79) 97   





        


 


11/9/17 10:15     99 Nasal Cannula 2.00 








Result Diagram:  


11/9/17 0450 11/9/17 0450








(1) COPD (chronic obstructive pulmonary disease)


Plan:   


IMPRESSION


1.  COPD with acute exacerbation


2.  Chronic left basilar effusion with loculation


3.  Atelectasis left lower lobe with mucus plugging.


4.  Hyponatremia.


5.  Abnormal liver enzymes


6.  Anemia and chronic disease.


7. Aortic Stenosis/AI


8. Soft tissue mass involving the left lung base with mass effect.  


9. 4.4 cm ascending aortic aneurysm.


10. Deconditioned








s/p 


left thoracoscopic exploration for loculated pleural effusion , possible 

thoracotomy decortication 


pulm toileting 





pain control 


GI motility meds





OOB/ ambulate 





extra dose of lasix, low dose BB 





(2) Tobacco abuse


Plan:  smoking cessation





(3) Physical deconditioning


Plan:  pt 





(4) Pleural effusion on left


Plan:  s/p left thoracoscopic exploration for loculated pleural effusion 


path pending 


cultures pending 








Problem Qualifiers





(1) COPD (chronic obstructive pulmonary disease):  


Qualified Codes:  J43.1 - Panlobular emphysema








Terwilliger,Jacqueline R. ARNP Nov 10, 2017 09:32

## 2017-11-10 NOTE — HHI.PR
Subjective


Remarks


S/P thoracotomy  and  decortication . Has  a chest tube in place. draining   

bloody  fluid


O2  sat 97 on 2 L


No fever.C/O pain  along  chest  wall.





Objective





Vital Signs








  Date Time  Temp Pulse Resp B/P (MAP) Pulse Ox O2 Delivery O2 Flow Rate FiO2


 


11/10/17 17:00  102      


 


11/10/17 15:45 98.5 83 16 119/67 (84) 92   


 


11/10/17 15:45  83      


 


11/10/17 15:45     92 Room Air  


 


11/10/17 15:00  82      


 


11/10/17 14:00  84      


 


11/10/17 14:00   16     


 


11/10/17 13:12   16     


 


11/10/17 13:00  98      


 


11/10/17 12:00  90      


 


11/10/17 12:00  90      


 


11/10/17 11:30 98.8 97 18 104/61 (75) 94   


 


11/10/17 11:30  94      


 


11/10/17 11:30     94 Room Air  


 


11/10/17 11:00  92      


 


11/10/17 10:00  126      


 


11/10/17 09:00  100      


 


11/10/17 08:00  104      


 


11/10/17 07:30  95      


 


11/10/17 07:30 98.9 94 16 99/57 (71) 93   


 


11/10/17 07:30 98.6 95 18 105/62 (76) 94   


 


11/10/17 07:30  94      


 


11/10/17 07:30     93 Room Air  


 


11/10/17 07:10     95   21


 


11/10/17 06:00  94      


 


11/10/17 06:00   16     


 


11/10/17 05:00  92      


 


11/10/17 04:00  90      


 


11/10/17 03:00 98.4 92 18 112/61 (78) 93   


 


11/10/17 03:00     93 Room Air  


 


11/10/17 03:00  89      


 


11/10/17 02:00  81      


 


11/10/17 01:25   16     


 


11/10/17 01:00  89      


 


11/10/17 00:00  89      


 


11/9/17 23:00  88      


 


11/9/17 23:00     98 Room Air  


 


11/9/17 23:00 98.3 87 18 112/63 (79) 98   


 


11/9/17 22:00  85      


 


11/9/17 22:00   16     


 


11/9/17 21:00  94      


 


11/9/17 20:00  90      


 


11/9/17 19:20 97.9 90 18 107/62 (77) 93   





    Arterial Line    


 


11/9/17 19:20     93 Room Air  














I/O      


 


 11/9/17 11/9/17 11/9/17 11/10/17 11/10/17 11/10/17





 07:00 15:00 23:00 07:00 15:00 23:00


 


Intake Total 490 ml  1709 ml 829 ml  


 


Output Total 500 ml  390 ml 1330 ml  


 


Balance -10 ml  1319 ml -501 ml  


 


      


 


Intake Oral 200 ml  1000 ml 480 ml  


 


IV Total 290 ml  709 ml 349 ml  


 


Output Urine Total 200 ml  0 ml 1200 ml  


 


Chest Tube Drainage Total 300 ml  390 ml 130 ml  


 


Bladder Scan Volume Amount 160 ml   427 ml  


 


# Bowel Movements 0  0 0  








Result Diagram:  


11/10/17 0925                                                                  

              11/10/17 0925





Objective Remarks


GENERAL:  This is an averagely built middle-aged white female who is pale.


HEENT:  Head normocephalic.  Pupils are reactive and equal.  Tongue moist.


Throat is clear.  Nasal mucosa dry.


NECK:  Supple.  No bruits, thyroid enlargement or lymphadenopathy.


CHEST:  Distant breath sounds at the left  lung base and occ  crackles left  

chest .


HEART:  The heart sounds are irregular, S1-S2.  No murmur.  No S3.


ABDOMEN:  Soft and nontender.  No organomegaly.  Bowel sounds are active.


EXTREMITIES:  Edema 1+ with diminished peripheral pulses. Bilateral Foot  

deformity.  NEUROLOGIC:


Reflexes are 1+ with no gross motor deficits.  


SKIN: Dry and scaly.





Assessment and Plan


Assessment and Plan





 


IMPRESSION


1.  COPD with acute exacerbation, resolved


2.  S/p decortication  left  Chest


3.  Atelectasis left lower lobe with mucus plugging.


4.  Hyponatremia.


5.  Abnormal liver enzymes


6.  Anemia and chronic disease.











Plan :








1. O2  2 L. PRN





2. IS q2h  at bedside





3.  Rocephin 1 Gm IV  daily





4. Duoneb   nebs tid.





5. CXR  in am





6. PT evaluation  for   ambulation.





7. PO Lasix  20 mg daily.











NATA Grey MD Nov 10, 2017 19:21

## 2017-11-11 NOTE — RADRPT
EXAM DATE/TIME:  11/11/2017 04:43 

 

[facility] COMPARISON:     

CHEST SINGLE AP, November 09, 2017, 4:41.

 

                     

INDICATIONS :     

Evaluate for effusion- Left side chest tube

                     

 

MEDICAL HISTORY :     

Chronic obstructive pulmonary disease.          

 

SURGICAL HISTORY :        

Thoracotomy

 

ENCOUNTER:     

Subsequent                                        

 

ACUITY:     

3 days      

 

PAIN SCORE:     

9/10

 

LOCATION:     

Bilateral chest 

 

FINDINGS:     

There is complete opacification of left hemithorax. 2 chest tubes remain in place. There is no  evide
nce of pneumothorax. The right lung is free of acute parenchymal opacity.

 

CONCLUSION:     

1. Complete opacification left hemithorax. The findings have worsened when compared with the prior ex
amination.

 

 

 

 Bryn Monterroso MD on November 11, 2017 at 5:16           

Board Certified Radiologist.

 This report was verified electronically.

## 2017-11-11 NOTE — HHI.PR
Subjective


Remarks


Patient is not in severe respiratory distress, but has left lung white out on 

today's chest x-ray.  Possible mucous plugging.  Potassium is low this morning.





Objective





Vital Signs








  Date Time  Temp Pulse Resp B/P (MAP) Pulse Ox O2 Delivery O2 Flow Rate FiO2


 


11/11/17 10:00  90      


 


11/11/17 09:00  102      


 


11/11/17 08:30     94 Nasal Cannula 3.00 


 


11/11/17 08:15 98.3 101 16 93/59 (70) 98   


 


11/11/17 08:15     98 Nasal Cannula 3.00 


 


11/11/17 08:00  100      


 


11/11/17 07:02  96      


 


11/11/17 06:25   16     


 


11/11/17 06:03  83      


 


11/11/17 06:00   16     


 


11/11/17 05:30  88      


 


11/11/17 04:20  92      


 


11/11/17 03:30 98.5 96 16 94/63 (73) 92   


 


11/11/17 03:30     92 Room Air  


 


11/11/17 03:07  90      


 


11/11/17 02:06  92      


 


11/11/17 01:43  89      


 


11/11/17 00:54  82      


 


11/11/17 00:00 98.6 85 17 105/61 100   


 


11/10/17 23:15 98.2 82 17 106/63 (77) 100   


 


11/10/17 23:15  82      


 


11/10/17 23:15 98.5 82 16 106/63 100   


 


11/10/17 23:15     100 Nasal Cannula 2.00 


 


11/10/17 22:45 98.3 78 16 95/57 98   


 


11/10/17 22:21  88 16 94/62 100   


 


11/10/17 22:15  88      


 


11/10/17 22:15 98.5 86 17 95/60 100   


 


11/10/17 22:10 98.5 87 16 97/58 100   


 


11/10/17 22:07   16     


 


11/10/17 22:04 98.5 88 17 100/59 100   


 


11/10/17 22:02   16     


 


11/10/17 22:00 98.7 90 18 89/51 92   


 


11/10/17 22:00   16     


 


11/10/17 21:55 98.8 86 17 97/55 92   


 


11/10/17 21:50 98.8 87 16 98/54 97   


 


11/10/17 21:00  96      


 


11/10/17 20:15     91   21


 


11/10/17 20:10 98.4 100 16 104/64 (77) 97   


 


11/10/17 20:10     97 Room Air  


 


11/10/17 20:00  86      


 


11/10/17 19:00  102      


 


11/10/17 18:00  99      


 


11/10/17 17:00  102      


 


11/10/17 15:45 98.5 83 16 119/67 (84) 92   


 


11/10/17 15:45  83      


 


11/10/17 15:45     92 Room Air  


 


11/10/17 15:00  82      


 


11/10/17 14:00  84      


 


11/10/17 14:00   16     


 


11/10/17 13:12   16     


 


11/10/17 13:00  98      


 


11/10/17 12:00  90      


 


11/10/17 12:00  90      


 


11/10/17 11:30 98.8 97 18 104/61 (75) 94   


 


11/10/17 11:30  94      


 


11/10/17 11:30     94 Room Air  


 


11/10/17 11:00  92      














I/O      


 


 11/10/17 11/10/17 11/10/17 11/11/17 11/11/17 11/11/17





 07:00 15:00 23:00 07:00 15:00 23:00


 


Intake Total 829 ml  1351 ml 890 ml  


 


Output Total 1330 ml  1820 ml 1320 ml  


 


Balance -501 ml  -469 ml -430 ml  


 


      


 


Intake Oral 480 ml  960 ml 480 ml  


 


IV Total 349 ml  381 ml   


 


Packed Cells    400 ml  


 


Blood Product IV Normal Saline Flush   10 ml 10 ml  


 


Output Urine Total 1200 ml  1650 ml 1150 ml  


 


Chest Tube Drainage Total 130 ml  170 ml 170 ml  


 


Bladder Scan Volume Amount 427 ml     


 


# Bowel Movements 0  0 0  








Result Diagram:  


11/11/17 0440 11/11/17 0440





Objective Remarks


GENERAL: NAD, A&Ox3


HEAD: Normocephalic. 


NECK: Supple, trachea midline. No lymphadenopathy.


EYES: No scleral icterus. No injection or drainage. 


CARDIOVASCULAR: Regular rate and rhythm without murmurs, gallops, or rubs. 


RESPIRATORY: Diminished absent breath sounds on the left.  Sounds on the right 

are clear and without rales or crackles.


GASTROINTESTINAL: Abdomen soft, non-tender, nondistended. 


MUSCULOSKELETAL: No cyanosis, or edema. 


SKIN: Warm and dry.


NEURO:  No focal neurological deficitis.





A/P


Problem List:  


(1) Pleural effusion on left


ICD Code:  J90 - Pleural effusion, not elsewhere classified


Status:  Acute


(2) COPD (chronic obstructive pulmonary disease)


ICD Code:  J44.9 - Chronic obstructive pulmonary disease, unspecified


Status:  Chronic


(3) Respiratory distress


ICD Code:  R06.00 - Dyspnea, unspecified


Status:  Resolved


(4) Physical deconditioning


ICD Code:  R53.81 - Other malaise


Status:  Chronic


Assessment and Plan





Assessment and Plan


56-year-old female admitted secondary to respiratory distress.  Left-sided 

chest tube remains.  White out of left lung discovered today.





Left pleural effusion


Thoracentesis and chest tube placement by IR on 10/26/17


Left thoracotomy with decortication on 11/08/17


Continue oxygen


Continue steroids


Continue bronchodilators


Pulmonology following


Cardiothoracic surgery following


Continue to follow cytology and cultures


Pain control as needed


Possible need for bronchoscopy


Follow in ICU





Hypokalemia


Replace as needed


Follow potassium levels





anemia of chronic disease


Follow CBC





urinary retention


intermittent catheterization.





Physical deconditioning


Continue PT as tolerated





Problem Qualifiers





(1) COPD (chronic obstructive pulmonary disease):  


Qualified Codes:  J43.1 - Panlobular emphysema








Elias Bowen MD Nov 11, 2017 10:22

## 2017-11-11 NOTE — PD.CAR.PN
CVT Progress Note


CVT: POD #:  3


Subjective/Hospital Course:


56-year-old white female with a history of COPD, chronic bronchitis who 

apparently was suffering from food poisoning.  The patient states that she was 

unable to pass urine and became very weak, had lost weight with diarrhea, 

abdominal discomfort, dehydration and presented to ER for evaluation. She c/o 

20 lb weight loss over the past 4 months. Chest CT done showed evidence of a 

loculated  mass-like infiltrate in the left lung base 12 x 12 cm and a left 

upper lobe lung nodule 7 mm.  The patient was started on Solu-Medrol, IV 

antibiotics including Rocephin and Zithromax.  Denies chest pains or abdominal 

pains or nausea or vomiting. She underwent chest tube placement in IR 10/26


with minimal drainage of this effusion.. Chest tube was removed. Repeat chest x-

ray shows no change in the mod pleural effusion but does shows some mild patchy 

opacities in the right lung base. 


cardiology Dr Sebastián de la torre pt   TTE shows preserved EF and at least moderate AS/AI.





present diagnosis : Respiratory Insufficiency, Left-sided consolidation , 

Exudative pleural effusion, COPD exacerbation, Urinary tract infection, x 

tobacco use


Anemia, thrombocytopenia, Moderate aortic valve regurgitation, Moderate 

calcific aortic valve stenosis





pleural fluid : exudative effusion. Cytology neg for malignant cells. Fluid cx 

neg 


CT chest 10/26 : Either highly complex fluid or soft tissue mass involving the 

left lung base with mass effect. This measures 12.2 x 12.6 x 7.4 cm.  4.4 cm 

ascending aortic aneurysm.





pt is now agreeable for surgery : plan is for left thoracoscopic exploration 

for loculated pleural effusion , possible  thoracotomy , decortication in am 





11/8


on nasal cannula 


surgery: Left thoracoscopic exploration, left thoracotomy for decortication, 

evacuation of complex loculated effusion, adhesiolysis





11/9


pain controlled with PCA morphine 


needs aggressive pulm toileting 


OOB, ambulate 


recheck UA / pleural fluid cultures and path pending 





11/10


 chest tubes drained 130cc/ 12 hrs serous drainage / leave chest tube in for 

now 


CXR noted , from 11/9, mucus plugging, aggressive pulm toileting 


add ezpap and acapella 


now on Rocephin, will dc ancef, await on UA culture 





OOB, ambulate, 





11/11/17


No complaints today.  Denies dyspnea, but CXR shows complete opacification of 

left lung


Objective:





Vital Signs








  Date Time  Temp Pulse Resp B/P (MAP) Pulse Ox O2 Delivery O2 Flow Rate FiO2


 


11/11/17 06:25   16     


 


11/11/17 06:03  83      


 


11/11/17 06:00   16     


 


11/11/17 05:30  88      


 


11/11/17 04:20  92      


 


11/11/17 03:30 98.5 96 16 94/63 (73) 92   


 


11/11/17 03:30     92 Room Air  


 


11/11/17 03:07  90      


 


11/11/17 02:06  92      


 


11/11/17 01:43  89      


 


11/11/17 00:54  82      


 


11/11/17 00:00 98.6 85 17 105/61 100   


 


11/10/17 23:15 98.2 82 17 106/63 (77) 100   


 


11/10/17 23:15  82      


 


11/10/17 23:15 98.5 82 16 106/63 100   


 


11/10/17 23:15     100 Nasal Cannula 2.00 


 


11/10/17 22:45 98.3 78 16 95/57 98   


 


11/10/17 22:21  88 16 94/62 100   


 


11/10/17 22:15  88      


 


11/10/17 22:15 98.5 86 17 95/60 100   


 


11/10/17 22:10 98.5 87 16 97/58 100   


 


11/10/17 22:07   16     


 


11/10/17 22:04 98.5 88 17 100/59 100   


 


11/10/17 22:02   16     


 


11/10/17 22:00 98.7 90 18 89/51 92   


 


11/10/17 22:00   16     


 


11/10/17 21:55 98.8 86 17 97/55 92   


 


11/10/17 21:50 98.8 87 16 98/54 97   


 


11/10/17 21:00  96      


 


11/10/17 20:15     91   21


 


11/10/17 20:10 98.4 100 16 104/64 (77) 97   


 


11/10/17 20:10     97 Room Air  


 


11/10/17 20:00  86      


 


11/10/17 19:00  102      


 


11/10/17 18:00  99      


 


11/10/17 17:00  102      


 


11/10/17 15:45 98.5 83 16 119/67 (84) 92   


 


11/10/17 15:45  83      


 


11/10/17 15:45     92 Room Air  


 


11/10/17 15:00  82      


 


11/10/17 14:00  84      


 


11/10/17 14:00   16     


 


11/10/17 13:12   16     


 


11/10/17 13:00  98      


 


11/10/17 12:00  90      


 


11/10/17 12:00  90      


 


11/10/17 11:30 98.8 97 18 104/61 (75) 94   


 


11/10/17 11:30  94      


 


11/10/17 11:30     94 Room Air  


 


11/10/17 11:00  92      


 


11/10/17 10:00  126      


 


11/10/17 09:00  100      








Labs:





Laboratory Tests








Test


  11/11/17


04:40


 


White Blood Count


  6.1 TH/MM3


(4.0-11.0)


 


Red Blood Count


  2.29 MIL/MM3


(4.00-5.30)


 


Hemoglobin


  8.0 GM/DL


(11.6-15.3)


 


Hematocrit


  23.0 %


(35.0-46.0)


 


Mean Corpuscular Volume


  100.7 FL


(80.0-100.0)


 


Mean Corpuscular Hemoglobin


  35.1 PG


(27.0-34.0)


 


Mean Corpuscular Hemoglobin


Concent 34.8 %


(32.0-36.0)


 


Red Cell Distribution Width


  21.9 %


(11.6-17.2)


 


Platelet Count


  64 TH/MM3


(150-450)


 


Mean Platelet Volume


  9.0 FL


(7.0-11.0)


 


Neutrophils (%) (Auto)


  68.5 %


(16.0-70.0)


 


Lymphocytes (%) (Auto)


  18.4 %


(9.0-44.0)


 


Monocytes (%) (Auto)


  12.2 %


(0.0-8.0)


 


Eosinophils (%) (Auto)


  0.7 %


(0.0-4.0)


 


Basophils (%) (Auto)


  0.2 %


(0.0-2.0)


 


Neutrophils # (Auto)


  4.1 TH/MM3


(1.8-7.7)


 


Lymphocytes # (Auto)


  1.1 TH/MM3


(1.0-4.8)


 


Monocytes # (Auto)


  0.7 TH/MM3


(0-0.9)


 


Eosinophils # (Auto)


  0.0 TH/MM3


(0-0.4)


 


Basophils # (Auto)


  0.0 TH/MM3


(0-0.2)


 


CBC Comment AUTO DIFF 


 


Blood Urea Nitrogen 8 MG/DL (7-18) 


 


Creatinine


  0.43 MG/DL


(0.50-1.00)


 


Random Glucose


  103 MG/DL


()


 


Total Protein


  4.6 GM/DL


(6.4-8.2)


 


Calcium Level


  7.4 MG/DL


(8.5-10.1)


 


Sodium Level


  134 MEQ/L


(136-145)


 


Potassium Level


  3.4 MEQ/L


(3.5-5.1)


 


Chloride Level


  97 MEQ/L


()


 


Carbon Dioxide Level


  29.8 MEQ/L


(21.0-32.0)


 


Anion Gap 7 MEQ/L (5-15) 


 


Estimat Glomerular Filtration


Rate 152 ML/MIN


(>89)


 


Protein Corrected Calcium


  8.8 MG/DL


(8.5-10.1)








Result Diagram:  


11/11/17 0440 11/11/17 0440





Imaging:





Last 24 hours Impressions








Chest X-Ray 11/11/17 0600 Signed





Impressions: 





 Service Date/Time:  Saturday, November 11, 2017 04:43 - CONCLUSION:  1. 

Complete 





 opacification left hemithorax. The findings have worsened when compared with 

the 





 prior examination.     Bryn Monterroso MD 








Cardiovascular:


RRR


Telemetry:


NSR


Pulmonary:


Decreased BS on left


GI/:


NABS, NT


Incision:


dry and intact


CT:


~340ml/24 hrs, no air leak


Plan:


Worse CXR, but patient is asymptomatic.


She will likely need bronchoscopy.


Incentive spirometer, up to chair, ambulate


Will discuss with Dr. Grey





(1) COPD (chronic obstructive pulmonary disease)


Plan:   


IMPRESSION


1.  COPD with acute exacerbation


2.  Chronic left basilar effusion with loculation


3.  Atelectasis left lower lobe with mucus plugging.


4.  Hyponatremia.


5.  Abnormal liver enzymes


6.  Anemia and chronic disease.


7. Aortic Stenosis/AI


8. Soft tissue mass involving the left lung base with mass effect.  


9. 4.4 cm ascending aortic aneurysm.


10. Deconditioned








s/p 


left thoracoscopic exploration for loculated pleural effusion , possible 

thoracotomy decortication 


pulm toileting 





pain control 


GI motility meds





OOB/ ambulate 





extra dose of lasix, low dose BB 





(2) Tobacco abuse


Plan:  smoking cessation





(3) Physical deconditioning


Plan:  pt 





(4) Pleural effusion on left


Plan:  s/p left thoracoscopic exploration for loculated pleural effusion 


path pending 


cultures pending 








Problem Qualifiers





(1) COPD (chronic obstructive pulmonary disease):  


Qualified Codes:  J43.1 - Panlobular emphysema








Jemima Wallace MD Nov 11, 2017 08:17

## 2017-11-12 NOTE — RADRPT
EXAM DATE/TIME:  11/12/2017 10:36 

 

[facility] COMPARISON:     

CHEST SINGLE AP, November 12, 2017, 10:06.

 

                     

INDICATIONS :     

Evaluate intubation

                     

 

MEDICAL HISTORY :     

Chronic obstructive pulmonary disease.          

 

SURGICAL HISTORY :        

Thoracotomy

 

ENCOUNTER:     

Subsequent                                        

 

ACUITY:     

4 - 6 days      

 

PAIN SCORE:     

Non-responsive.

 

LOCATION:      

chest 

 

FINDINGS:     

Endotracheal tube has been inserted and is located approximately 1-2 cm above the [place]. Slight incr
eased aeration is seen in the left upper lobe. Left lung otherwise remains consolidated.

 

Left-sided chest tubes and central line are in stable position.

 

CONCLUSION:     

Endotracheal tube 1-2 cm above the [place].

 

Slight improvement in left upper lobe aeration.

 

 

 

 Stephan Parson MD on November 12, 2017 at 11:28           

Board Certified Radiologist.

 This report was verified electronically.

## 2017-11-12 NOTE — HHI.PR
Subjective


Remarks


Patient reports worsening of respiratory status overnight.  This continued 

through the morning and patient was transferred to CVICU.  Chest x-ray is 

pending.





Objective





Vital Signs








  Date Time  Temp Pulse Resp B/P (MAP) Pulse Ox O2 Delivery O2 Flow Rate FiO2


 


11/12/17 09:42     74 Non-Rebreather  100


 


11/12/17 09:29   16     


 


11/12/17 08:00  104      


 


11/12/17 07:00  126      


 


11/12/17 07:00     94 Nasal Cannula 2.00 


 


11/12/17 07:00 98.7 126 18 110/62 (78) 94   


 


11/12/17 06:09  99      


 


11/12/17 06:00   18     


 


11/12/17 05:02  91      


 


11/12/17 04:13  102      


 


11/12/17 03:30  94      


 


11/12/17 03:30     95 Nasal Cannula 1.50 


 


11/12/17 03:30 98.4 94 17 119/61 (80) 95   


 


11/12/17 02:04  95      


 


11/12/17 01:55     94 Nasal Cannula 2.00 


 


11/12/17 01:10  100      


 


11/12/17 00:00  94      


 


11/11/17 23:30 97.9 92 18 116/62 (80) 93   


 


11/11/17 23:30     93 Nasal Cannula 1.50 


 


11/11/17 23:00  95      


 


11/11/17 22:50   18     


 


11/11/17 22:00  100      


 


11/11/17 21:00  88      


 


11/11/17 20:38     94   21


 


11/11/17 20:27     99 Nasal Cannula 2.00 


 


11/11/17 20:00  94      


 


11/11/17 19:20 97.8 101 16 119/63 (81) 99   


 


11/11/17 19:20     99 Nasal Cannula 3.00 


 


11/11/17 19:00  92      


 


11/11/17 18:00  92      


 


11/11/17 17:00  92      


 


11/11/17 16:03  84      


 


11/11/17 16:00 98.2 99 18 101/64 (76) 100   


 


11/11/17 15:00     98 Nasal Cannula 3.00 


 


11/11/17 15:00  92      


 


11/11/17 14:31   18     


 


11/11/17 14:00  94      


 


11/11/17 14:00   18     


 


11/11/17 13:00  96      


 


11/11/17 12:03  93      


 


11/11/17 11:00 98.0 101 18 116/71 (86) 100   


 


11/11/17 11:00     98 Nasal Cannula 3.00 


 


11/11/17 11:00  92      














I/O      


 


 11/11/17 11/11/17 11/11/17 11/12/17 11/12/17 11/12/17





 07:00 15:00 23:00 07:00 15:00 23:00


 


Intake Total 890 ml  1923 ml 917 ml  


 


Output Total 1320 ml  1100 ml 675 ml  


 


Balance -430 ml  823 ml 242 ml  


 


      


 


Intake Oral 480 ml  480 ml 480 ml  


 


IV Total   1443 ml 437 ml  


 


Packed Cells 400 ml     


 


Blood Product IV Normal Saline Flush 10 ml     


 


Output Urine Total 1150 ml  900 ml 675 ml  


 


Chest Tube Drainage Total 170 ml  200 ml   


 


# Bowel Movements 0  0   








Result Diagram:  


11/12/17 0325 11/12/17 0325





Objective Remarks


GENERAL: NAD, A&Ox3


HEAD: Normocephalic. 


NECK: Supple, trachea midline. No lymphadenopathy.


EYES: No scleral icterus. No injection or drainage. 


CARDIOVASCULAR: Regular rate and rhythm without murmurs, gallops, or rubs. 


RESPIRATORY: Absent breath sounds on the left.  Sounds on the right are clear 

and without rales or crackles.


GASTROINTESTINAL: Abdomen soft, non-tender, nondistended. 


MUSCULOSKELETAL: No cyanosis, or edema. 


SKIN: Warm and dry.


NEURO:  No focal neurological deficitis.





A/P


Problem List:  


(1) Pleural effusion on left


ICD Code:  J90 - Pleural effusion, not elsewhere classified


Status:  Acute


(2) COPD (chronic obstructive pulmonary disease)


ICD Code:  J44.9 - Chronic obstructive pulmonary disease, unspecified


Status:  Chronic


(3) Respiratory distress


ICD Code:  R06.00 - Dyspnea, unspecified


Status:  Resolved


(4) Physical deconditioning


ICD Code:  R53.81 - Other malaise


Status:  Chronic


Assessment and Plan





Assessment and Plan


56-year-old female admitted secondary to respiratory distress.  Left-sided 

chest tube remains.  White out of left lung discovered yesterday.  Worsening 

respiratory status is morning.  Patient transfer to CVICU.  Intensivist 

consulted.  Chest x-ray pending.  Continue nebulized treatments as needed.





Left pleural effusion


Thoracentesis and chest tube placement by IR on 10/26/17


Left thoracotomy with decortication on 11/08/17


Continue oxygen


Continue steroids


Continue bronchodilators


Pulmonology following


Cardiothoracic surgery following


Continue to follow cytology and cultures


Pain control as needed


Possible need for bronchoscopy


Follow in ICU





Hypokalemia


Replace as needed


Follow potassium levels





anemia of chronic disease


Follow CBC





urinary retention


intermittent catheterization.





Physical deconditioning


Continue PT as tolerated





Problem Qualifiers





(1) COPD (chronic obstructive pulmonary disease):  


Qualified Codes:  J43.1 - Panlobular emphysema








Elias Bowen MD Nov 12, 2017 10:15

## 2017-11-12 NOTE — PD.CAR.PN
CVT Progress Note


CVT: POD #:  4


Subjective/Hospital Course:


56-year-old white female with a history of COPD, chronic bronchitis who 

apparently was suffering from food poisoning.  The patient states that she was 

unable to pass urine and became very weak, had lost weight with diarrhea, 

abdominal discomfort, dehydration and presented to ER for evaluation. She c/o 

20 lb weight loss over the past 4 months. Chest CT done showed evidence of a 

loculated  mass-like infiltrate in the left lung base 12 x 12 cm and a left 

upper lobe lung nodule 7 mm.  The patient was started on Solu-Medrol, IV 

antibiotics including Rocephin and Zithromax.  Denies chest pains or abdominal 

pains or nausea or vomiting. She underwent chest tube placement in IR 10/26


with minimal drainage of this effusion.. Chest tube was removed. Repeat chest x-

ray shows no change in the mod pleural effusion but does shows some mild patchy 

opacities in the right lung base. 


cardiology Dr Sebastián de la torre pt   TTE shows preserved EF and at least moderate AS/AI.





present diagnosis : Respiratory Insufficiency, Left-sided consolidation , 

Exudative pleural effusion, COPD exacerbation, Urinary tract infection, x 

tobacco use


Anemia, thrombocytopenia, Moderate aortic valve regurgitation, Moderate 

calcific aortic valve stenosis





pleural fluid : exudative effusion. Cytology neg for malignant cells. Fluid cx 

neg 


CT chest 10/26 : Either highly complex fluid or soft tissue mass involving the 

left lung base with mass effect. This measures 12.2 x 12.6 x 7.4 cm.  4.4 cm 

ascending aortic aneurysm.





pt is now agreeable for surgery : plan is for left thoracoscopic exploration 

for loculated pleural effusion , possible  thoracotomy , decortication in am 





11/8


on nasal cannula 


surgery: Left thoracoscopic exploration, left thoracotomy for decortication, 

evacuation of complex loculated effusion, adhesiolysis





11/9


pain controlled with PCA morphine 


needs aggressive pulm toileting 


OOB, ambulate 


recheck UA / pleural fluid cultures and path pending 





11/10


 chest tubes drained 130cc/ 12 hrs serous drainage / leave chest tube in for 

now 


CXR noted , from 11/9, mucus plugging, aggressive pulm toileting 


add ezpap and acapella 


now on Rocephin, will dc ancef, await on UA culture 





OOB, ambulate, 





11/11/17


No complaints today.  Denies dyspnea, but CXR shows complete opacification of 

left lung





11/12/17


Hypoxic this morning despite 100% FiO2.  Transferred to CVICU and reintubated.  

Bronchoscopy pending.


Objective:





Vital Signs








  Date Time  Temp Pulse Resp B/P (MAP) Pulse Ox O2 Delivery O2 Flow Rate FiO2


 


11/12/17 10:40     100   50


 


11/12/17 10:15     100   70


 


11/12/17 09:42     74 Non-Rebreather  100


 


11/12/17 09:29   16     


 


11/12/17 08:00  104      


 


11/12/17 07:00  126      


 


11/12/17 07:00     94 Nasal Cannula 2.00 


 


11/12/17 07:00 98.7 126 18 110/62 (78) 94   


 


11/12/17 06:09  99      


 


11/12/17 06:00   18     


 


11/12/17 05:02  91      


 


11/12/17 04:13  102      


 


11/12/17 03:30  94      


 


11/12/17 03:30     95 Nasal Cannula 1.50 


 


11/12/17 03:30 98.4 94 17 119/61 (80) 95   


 


11/12/17 02:04  95      


 


11/12/17 01:55     94 Nasal Cannula 2.00 


 


11/12/17 01:10  100      


 


11/12/17 00:00  94      


 


11/11/17 23:30 97.9 92 18 116/62 (80) 93   


 


11/11/17 23:30     93 Nasal Cannula 1.50 


 


11/11/17 23:00  95      


 


11/11/17 22:50   18     


 


11/11/17 22:00  100      


 


11/11/17 21:00  88      


 


11/11/17 20:38     94   21


 


11/11/17 20:27     99 Nasal Cannula 2.00 


 


11/11/17 20:00  94      


 


11/11/17 19:20 97.8 101 16 119/63 (81) 99   


 


11/11/17 19:20     99 Nasal Cannula 3.00 


 


11/11/17 19:00  92      


 


11/11/17 18:00  92      


 


11/11/17 17:00  92      


 


11/11/17 16:03  84      


 


11/11/17 16:00 98.2 99 18 101/64 (76) 100   


 


11/11/17 15:00     98 Nasal Cannula 3.00 


 


11/11/17 15:00  92      


 


11/11/17 14:31   18     


 


11/11/17 14:00  94      


 


11/11/17 14:00   18     


 


11/11/17 13:00  96      


 


11/11/17 12:03  93      








Labs:





Laboratory Tests








Test


  11/12/17


03:25 11/12/17


09:49


 


White Blood Count


  5.1 TH/MM3


(4.0-11.0) 


 


 


Red Blood Count


  2.26 MIL/MM3


(4.00-5.30) 


 


 


Hemoglobin


  7.9 GM/DL


(11.6-15.3) 


 


 


Hematocrit


  23.0 %


(35.0-46.0) 


 


 


Mean Corpuscular Volume


  101.8 FL


(80.0-100.0) 


 


 


Mean Corpuscular Hemoglobin


  35.1 PG


(27.0-34.0) 


 


 


Mean Corpuscular Hemoglobin


Concent 34.5 %


(32.0-36.0) 


 


 


Red Cell Distribution Width


  21.5 %


(11.6-17.2) 


 


 


Platelet Count


  55 TH/MM3


(150-450) 


 


 


Mean Platelet Volume


  8.9 FL


(7.0-11.0) 


 


 


Neutrophils (%) (Auto)


  61.8 %


(16.0-70.0) 


 


 


Lymphocytes (%) (Auto)


  26.3 %


(9.0-44.0) 


 


 


Monocytes (%) (Auto)


  10.7 %


(0.0-8.0) 


 


 


Eosinophils (%) (Auto)


  0.7 %


(0.0-4.0) 


 


 


Basophils (%) (Auto)


  0.5 %


(0.0-2.0) 


 


 


Neutrophils # (Auto)


  3.2 TH/MM3


(1.8-7.7) 


 


 


Lymphocytes # (Auto)


  1.3 TH/MM3


(1.0-4.8) 


 


 


Monocytes # (Auto)


  0.5 TH/MM3


(0-0.9) 


 


 


Eosinophils # (Auto)


  0.0 TH/MM3


(0-0.4) 


 


 


Basophils # (Auto)


  0.0 TH/MM3


(0-0.2) 


 


 


CBC Comment DIFF FINAL  


 


Differential Comment   


 


Blood Urea Nitrogen 7 MG/DL (7-18)  


 


Creatinine


  0.25 MG/DL


(0.50-1.00) 


 


 


Random Glucose


  77 MG/DL


() 


 


 


Total Protein


  4.6 GM/DL


(6.4-8.2) 


 


 


Albumin


  1.9 GM/DL


(3.4-5.0) 


 


 


Calcium Level


  7.5 MG/DL


(8.5-10.1) 


 


 


Alkaline Phosphatase


  134 U/L


() 


 


 


Aspartate Amino Transf


(AST/SGOT) 21 U/L (15-37) 


  


 


 


Alanine Aminotransferase


(ALT/SGPT) 13 U/L (10-53) 


  


 


 


Total Bilirubin


  0.5 MG/DL


(0.2-1.0) 


 


 


Sodium Level


  135 MEQ/L


(136-145) 


 


 


Potassium Level


  3.5 MEQ/L


(3.5-5.1) 


 


 


Chloride Level


  99 MEQ/L


() 


 


 


Carbon Dioxide Level


  31.7 MEQ/L


(21.0-32.0) 


 


 


Anion Gap 4 MEQ/L (5-15)  


 


Estimat Glomerular Filtration


Rate 284 ML/MIN


(>89) 


 


 


Blood Gas Puncture Site  RT RADIAL 


 


Blood Gas Patient Temperature  98.6 


 


Blood Gas HCO3


  


  29 mmol/L


(22-26)


 


Blood Gas Base Excess


  


  2.6 mmol/L


(-2-2)


 


Blood Gas Oxygen Saturation  79 % () 


 


Arterial Blood pH


  


  7.29


(7.380-7.420)


 


Arterial Blood Partial


Pressure CO2 


  62 mmHg


(38-42)


 


Arterial Blood Partial


Pressure O2 


  53 mmHg


()


 


Arterial Blood Oxygen Content


  


  10.0 Vol %


(12.0-20.0)


 


Arterial Blood


Carboxyhemoglobin 


  1.7 % (0-4) 


 


 


Arterial Blood Methemoglobin  1.1 % (0-2) 


 


Blood Gas Hemoglobin


  


  9.0 G/DL


(12.0-16.0)


 


Oxygen Delivery Device  NRB 


 


Blood Gas Inspired Oxygen  100 % 








Result Diagram:  


11/12/17 0325                                                                  

              11/12/17 0325





Imaging:





Last 24 hours Impressions








Chest X-Ray 11/12/17 0000 Signed





Impressions: 





 Service Date/Time:  Sunday, November 12, 2017 10:06 - CONCLUSION:  1. 





 Consolidated left lung; unchanged 2. Stable left chest tubes 3. Pulmonary 





 congestive changes right lung with diffuse vascular  engorgement.    Stephan Parson MD 








Cardiovascular:


RRR


Telemetry:


NSR


Pulmonary:


No BS on left


GI/:


Decreased BS, NT


Incision:


dry and intact


CT:


~300ml/24 hrs - no air leak


Plan:


Dr. Black consulted from critical care to manage ventilator and medical issues.


She should improve following bronchoscopy.


Will likely require blood transfusion.


continue chest tubes to suction





(1) COPD (chronic obstructive pulmonary disease)


Plan:   


IMPRESSION


1.  COPD with acute exacerbation


2.  Chronic left basilar effusion with loculation


3.  Atelectasis left lower lobe with mucus plugging.


4.  Hyponatremia.


5.  Abnormal liver enzymes


6.  Anemia and chronic disease.


7. Aortic Stenosis/AI


8. Soft tissue mass involving the left lung base with mass effect.  


9. 4.4 cm ascending aortic aneurysm.


10. Deconditioned








s/p 


left thoracoscopic exploration for loculated pleural effusion , possible 

thoracotomy decortication 


pulm toileting 





pain control 


GI motility meds





OOB/ ambulate 





extra dose of lasix, low dose BB 





(2) Tobacco abuse


Plan:  smoking cessation





(3) Physical deconditioning


Plan:  pt 





(4) Pleural effusion on left


Plan:  s/p left thoracoscopic exploration for loculated pleural effusion 


path pending 


cultures pending 








Problem Qualifiers





(1) COPD (chronic obstructive pulmonary disease):  


Qualified Codes:  J43.1 - Panlobular emphysema








Jemima Wallace MD Nov 12, 2017 11:18

## 2017-11-12 NOTE — HHI.PR
Subjective


Remarks


S/P thoracotomy  and  decortication . Has  a chest tube in place. draining   

bloody  fluid


Was   hypoxic  today  and C XR  showed whiteout of left  side.


No fever.C/O pain  along  chest  wall.





Objective





Vital Signs








  Date Time  Temp Pulse Resp B/P (MAP) Pulse Ox O2 Delivery O2 Flow Rate FiO2


 


11/12/17 11:00        45


 


11/12/17 11:00 97.4 92 16 106/72 (83) 100   


 


11/12/17 11:00     100 Mechanical Ventilator  45


 


11/12/17 11:00  92      


 


11/12/17 10:40     100   50


 


11/12/17 10:15     100   70


 


11/12/17 10:00  120      


 


11/12/17 09:42     74 Non-Rebreather  100


 


11/12/17 09:29   16     


 


11/12/17 09:00  114      


 


11/12/17 08:00  104      


 


11/12/17 08:00     86 Partial Non-Rebreather 15.00 100


 


11/12/17 07:00  126      


 


11/12/17 07:00     94 Nasal Cannula 2.00 


 


11/12/17 07:00 98.7 126 18 110/62 (78) 94   


 


11/12/17 06:09  99      


 


11/12/17 06:00   18     


 


11/12/17 05:02  91      


 


11/12/17 04:13  102      


 


11/12/17 03:30  94      


 


11/12/17 03:30     95 Nasal Cannula 1.50 


 


11/12/17 03:30 98.4 94 17 119/61 (80) 95   


 


11/12/17 02:04  95      


 


11/12/17 01:55     94 Nasal Cannula 2.00 


 


11/12/17 01:10  100      


 


11/12/17 00:00  94      


 


11/11/17 23:30 97.9 92 18 116/62 (80) 93   


 


11/11/17 23:30     93 Nasal Cannula 1.50 


 


11/11/17 23:00  95      


 


11/11/17 22:50   18     


 


11/11/17 22:00  100      


 


11/11/17 21:00  88      


 


11/11/17 20:38     94   21


 


11/11/17 20:27     99 Nasal Cannula 2.00 


 


11/11/17 20:00  94      


 


11/11/17 19:20 97.8 101 16 119/63 (81) 99   


 


11/11/17 19:20     99 Nasal Cannula 3.00 


 


11/11/17 19:00  92      


 


11/11/17 18:00  92      


 


11/11/17 17:00  92      


 


11/11/17 16:03  84      


 


11/11/17 16:00 98.2 99 18 101/64 (76) 100   














I/O      


 


 11/11/17 11/11/17 11/11/17 11/12/17 11/12/17 11/12/17





 07:00 15:00 23:00 07:00 15:00 23:00


 


Intake Total 890 ml  1923 ml 917 ml  


 


Output Total 1320 ml  1100 ml 675 ml  


 


Balance -430 ml  823 ml 242 ml  


 


      


 


Intake Oral 480 ml  480 ml 480 ml  


 


IV Total   1443 ml 437 ml  


 


Packed Cells 400 ml     


 


Blood Product IV Normal Saline Flush 10 ml     


 


Output Urine Total 1150 ml  900 ml 675 ml  


 


Chest Tube Drainage Total 170 ml  200 ml   


 


# Bowel Movements 0  0   








Result Diagram:  


11/12/17 0325 11/12/17 0325





Objective Remarks


GENERAL:  This is an averagely built middle-aged white female who is pale.


HEENT:  Head normocephalic.  Pupils are reactive and equal.  Tongue moist.


Throat is clear.  Nasal mucosa dry.


NECK:  Supple.  No bruits, thyroid enlargement or lymphadenopathy.


CHEST:  Distant breath sounds at the left  lung base and occ  crackles left  

chest .


HEART:  The heart sounds are irregular, S1-S2.  No murmur.  No S3.


ABDOMEN:  Soft and nontender.  No organomegaly.  Bowel sounds are active.


EXTREMITIES:  Edema 1+ with diminished peripheral pulses. Bilateral Foot  

deformity.  NEUROLOGIC:


Reflexes are 1+ .Sedated. 


SKIN: Dry and scaly.





Assessment and Plan


Assessment and Plan





 


IMPRESSION


1.  COPD with acute exacerbation, resolved


2.  S/p decortication  left  Chest


3.  Atelectasis left lower lobe with mucus plugging.


4.  Hyponatremia.


5.  Abnormal liver enzymes


6.  Anemia and chronic disease.











Plan :








1.  Bronchoscopy  with lavage . D/W .





2. Sedation with  diprivan





3. Cont  antibiotics.





4. Duoneb   nebs tid.





5. CXR  in am





6. Add Mucomyst 20 % nebs q6h.





7. Cont Lasix  20 mg daily.











NATA Grey MD Nov 12, 2017 15:35

## 2017-11-12 NOTE — PD.PROCEDR
Procedure Note


Procedure


After the risks and benefits were discussed the following procedure was 

performed:


INTUBATION: The patient was put in optimal position for the procedure.  Rapid 

sequence intubation was initiated by me using 20 milligrams of etomidate IV and 

5 milligrams of Versed IV.  The patient was intubated with a 8.0 cuffed 

endotracheal tube. DL Mac4 blade Grade 1 view. Tube placement was confirmed by 

visualization of the tube and balloon passing through the cords, capnometry and 

subsequent chest x-ray.  Breath sounds were equal and well aerated bilaterally 

postintubation.  No breath sounds over stomach.  Patient tolerated procedure 

well.











Kalin Black MD Nov 12, 2017 10:41

## 2017-11-12 NOTE — RADRPT
EXAM DATE/TIME:  11/12/2017 18:30 

 

[facility] COMPARISON:     

CHEST SINGLE AP, November 12, 2017, 10:36.

 

                     

INDICATIONS :     

Evaluate NG tube placement

                     

 

MEDICAL HISTORY :     

Chronic obstructive pulmonary disease.          

 

SURGICAL HISTORY :        

Thoracotomy

 

ENCOUNTER:     

Initial                                        

 

ACUITY:     

4 - 6 days      

 

PAIN SCORE:     

Non-responsive.

 

LOCATION:       

Abdomen

 

FINDINGS:     

Examination of the abdomen demonstrates a nonobstructed bowel gas pattern. Stool is seen in the proxi
mal descending colon. No pneumoperitoneum. Nasogastric tube is identified with the tip in the proxima
l stomach. Volume loss in the left hemithorax with consolidation in the left base. 2 thoracostomy tub
es project over the left hemithorax.

 

CONCLUSION:     

1. No obvious obstruction or pneumoperitoneum on this limited view of the abdomen.

2. Nasogastric tube with the tip projecting over the proximal gastric body.

3. Volume loss in the left hemithorax with stable position of 2 thoracostomy tubes and left basilar c
onsolidation.

.

 

 

 

 Chris Kumari MD on November 12, 2017 at 18:44           

Board Certified Radiologist.

 This report was verified electronically.

## 2017-11-12 NOTE — RADRPT
EXAM DATE/TIME:  11/12/2017 10:06 

 

[facility] COMPARISON:     

CHEST SINGLE AP, November 11, 2017, 4:43.

 

                     

INDICATIONS :     

Shortness of breath.

                     

 

MEDICAL HISTORY :            

Chronic obstructive pulmonary disease.   

 

SURGICAL HISTORY :        

Thoracotomy

 

ENCOUNTER:     

Subsequent                                        

 

ACUITY:     

4 - 6 days      

 

PAIN SCORE:     

0/10

 

LOCATION:     

Bilateral chest 

 

FINDINGS:     

Single view of the chest continues to demonstrate complete opacification left hemithorax. 2 left-side
d chest tubes remain in place. There is right-to-left shift of the cardiac mediastinal structures.

 

Vascular congestive changes remain evident in the right lung.

 

Right central line is in stable position

 

 

CONCLUSION:     

1. Consolidated left lung; unchanged

2. Stable left chest tubes

3. Pulmonary congestive changes right lung with diffuse vascular  engorgement.

 

 

 Stephan Parson MD on November 12, 2017 at 10:26           

Board Certified Radiologist.

 This report was verified electronically.

## 2017-11-12 NOTE — HHI.CCPN
Subjective


Remarks/Hospital Course


The patient is a 56-year-old female with past medical history of COPD, who 

presented to [facility] ED with complaints of coughing, wheezing. 

The patient states that she had food poisoning last week where she had nausea 

and diarrhea, after she ate suspicious food. She denies any worsening of 

dyspnea from baseline. In addition, she denies any constitutional symptoms. She 

quit smoking five and a half years ago and used to smoke one to two packs per 

day for about 30 years.  Chest x-ray in the ER showed consolidation with large 

effusion and volume loss on the left.  On further history she denies any 

hemoptysis, however, she reports 20 pounds weight loss in the last 4 months and 

decreased p.o. intake.  She is being followed up by Dr. Grey her outpatient 

pulmonologist.  The patient denies any use of oxygen at home, however, she is 

on bronchodilators p.r.n. Her laboratory data is significant for hyponatremia 

with sodium level 125, lactic acid level 2.1.  On arrival to the ER she was 

tachycardic and tachypneic.  When seen the patient is on 4 liters nasal cannula 

with saturation ranges between %.  In the ER she was given Rocephin, 

azithromycin, Solu-Medrol, bronchodilator treatment and IV fluids.  Her current 

blood pressure is 107/61.





10/27 Patient s/p CT guided thoracentesis and CT placement by IR yesterday. She 

is feeling better. 





Subjective:


10/28 She complains of pain and requests increased pain meds. Repeat imaging 

ordered per pulmonology to evaluate mass vs loculated effusion. On NC. 





[facility] RECONSULT NOTE 11/12/17


Critical care consulted for severe hypoxemic respiratory failure.  On 11/8/17 

patient had left thoracotomy for decortication, evacuation of complex loculated 

effusion, adhesiolysis by Dr. Rodriguez.  CXR 11/11/17 showed near complete 

whiteout of the left lung-pulmonary Dr. Khalil is following.  Today a HALICAT 

was called as the patient was found profoundly hypoxemic in the 70s oxygen 

saturation.  She was placed on 100% percent nonrebreather with improvement in 

saturation only to 81%.  Stat ABG on 100% nonrebreather showed pH of 7.29 PCO2 

of 62 pO2 of 53 and oxygen saturation 79%.  Stat chest x-ray showed complete 

whiteout of the left lung.  I discussed with the patient briefly, she is 

agreeable for intubation.  I proceeded with endotracheal intubation and placed 

on mechanical ventilation -patient saturation gradually improved to 100%.  

Discussed with pulmonology Dr. Khalil.  He plans to do bronchoscopy today 

afternoon.





Objective





Vital Signs








  Date Time  Temp Pulse Resp B/P (MAP) Pulse Ox O2 Delivery O2 Flow Rate FiO2


 


11/12/17 09:42     74 Non-Rebreather  100


 


11/12/17 09:29   16     


 


11/12/17 08:00  104      


 


11/12/17 07:00       2.00 


 


11/12/17 07:00 98.7   110/62 (78)    














Intake and Output   


 


 11/12/17 11/12/17 11/13/17





 08:00 16:00 00:00


 


Intake Total 917 ml  


 


Output Total 675 ml  


 


Balance 242 ml  








Result Diagram:  


11/12/17 0325                                                                  

              11/12/17 0325





Other Results





Microbiology








 Date/Time


Source Procedure


Growth Status


 


 


 11/9/17 19:00


Urine Clean Catch Urine Culture - Final


NO GROWTH IN 48 HOURS. Complete








Laboratory Tests








Test


  11/12/17


09:49


 


Blood Gas Puncture Site RT RADIAL 


 


Blood Gas Patient Temperature 98.6 


 


Blood Gas HCO3


  29 mmol/L


(22-26)


 


Blood Gas Base Excess


  2.6 mmol/L


(-2-2)


 


Blood Gas Oxygen Saturation 79 % () 


 


Arterial Blood pH


  7.29


(7.380-7.420)


 


Arterial Blood Partial


Pressure CO2 62 mmHg


(38-42)


 


Arterial Blood Partial


Pressure O2 53 mmHg


()


 


Arterial Blood Oxygen Content


  10.0 Vol %


(12.0-20.0)


 


Arterial Blood


Carboxyhemoglobin 1.7 % (0-4) 


 


 


Arterial Blood Methemoglobin 1.1 % (0-2) 


 


Blood Gas Hemoglobin


  9.0 G/DL


(12.0-16.0)


 


Oxygen Delivery Device NRB 


 


Blood Gas Inspired Oxygen 100 % 








Imaging





Last Impressions








Chest Tube Insertion 10/26/17 1224 Signed





Impressions: 





 Service Date/Time:  Thursday, October 26, 2017 13:22 - CONCLUSION: 

Uncomplicated 





 CT-guided thoracentesis.     Jorge Carrillo MD 


 


Chest X-Ray 10/26/17 1003 Signed





Impressions: 





 Service Date/Time:  Thursday, October 26, 2017 10:24 - CONCLUSION:  

Combination 





 of extensive consolidation, large effusion and volume loss on the left.     

Wayne Bell MD 


 


Chest CT 10/26/17 0000 Signed





Impressions: 





 Service Date/Time:  Thursday, October 26, 2017 13:07 - CONCLUSION:  1. Either 





 highly complex fluid or soft tissue mass involving the left lung base with 

mass 





 effect. This measures 12.2 x 12.6 x 7.4 cm. An ultrasound canal differentiate. 





 2. 7 mm nodule on the left upper lobe. 3. Material filling the left mainstem 





 bronchus either related to purulent material or mucus. There is resulting 

volume 





 loss of the entire left lung. 4. 4.4 cm ascending aortic aneurysm.     Efrain Gomez Jr., MD 








Objective Remarks


GENERAL: Patient is 57 yo female who is pale chronically ill appearing, in 

acute respiratory distress, O2 sat 81% on 100% NRB


SKIN: Warm and dry.


HEAD: Normocephalic.


EYES: No scleral icterus. No injection or drainage. 


NECK: Supple, trachea midline. No JVD or lymphadenopathy.


CARDIOVASCULAR: Regular rate and rhythm with 3/6 systolic murmur in all areas, 

predominantly L and R upper sternal borders


RESPIRATORY: Tachypneic with accessory muscle use.  Diminished breath sounds on 

the left. L chest tube to suction draining 180 ml blood tinged fluid in last 4 

hours. 


GASTROINTESTINAL: Abdomen soft, non-tender, nondistended. Bowel sounds present.

  


MUSCULOSKELETAL: No cyanosis. Trace edema


NEURO: Awake and alert, moving all extremities with no focal deficit. Resp 

distress limits exam





Procedures


CT guided thoracentesis with L chest tube placement 10/26


Left thoracoscopic exploration, left thoracotomy for decortication, evacuation 

of complex loculated effusion, adhesiolysis 11/8/17


Urinary Catheter:  Yes


Assessment to:  Continue


Vascular Central Line Catheter:  Yes


Assessment to:  Continue





A/P


Problem List:  


(1) Acute hypoxemic respiratory failure


ICD Code:  J96.01 - Acute respiratory failure with hypoxia


(2) Atelectasis of left lung


ICD Code:  J98.11 - Atelectasis


(3) Pleural effusion on left


ICD Code:  J90 - Pleural effusion, not elsewhere classified


Status:  Acute


(4) Pain


ICD Code:  R52 - Pain, unspecified


Status:  Acute


(5) Tobacco abuse


ICD Code:  Z72.0 - Tobacco use


Status:  Chronic


(6) COPD (chronic obstructive pulmonary disease)


ICD Code:  J44.9 - Chronic obstructive pulmonary disease, unspecified


Status:  Chronic


(7) Hyponatremia


ICD Code:  E87.1 - Hypo-osmolality and hyponatremia


Status:  Acute


(8) Respiratory distress


ICD Code:  R06.00 - Dyspnea, unspecified


Status:  Resolved


(9) UTI (urinary tract infection)


ICD Code:  N39.0 - Urinary tract infection, site not specified


Status:  Acute


(10) Physical deconditioning


ICD Code:  R53.81 - Other malaise


Status:  Chronic


Assessment and Plan


ASSESSMENT 


Acute hypoxemic respiratory failure


Complete left lung atelectasis


Left-sided consolidation with pleural effusion.


Pulmonary hypertension


Left thoracotomy with decortication, evacuation of complex loculated effusion, 

adhesiolysis


COPD exacerbation.


Hyponatremia/hypophosphatemia


Hypokalemia.


Urinary tract infection on abx








Plan


Neuro:


Postintubation placed on propofol and fentanyl for sedation and vent synchrony


Pain control with fentanyl





Pulm: 


Acute hypoxemic respiratory failure


Complete L lung atelectasis


S/p decortication for L hemothorax


COPD


Tobacco abuse


?L lung mass vs loculated effusion/


Emergently intubated and placed on mechanical ventilation


Bronchoscopy by Dr. Grey. 


Status post left thoracotomy and decortication 11/8/17-postop diagnosis 

hemothorax


Bronchodilators, Solumedrol 60 mg IV q8 hours 


s/p CT guided left-sided thoracentesis and CT placement by IR   10/26/17


CT chest: Either highly complex fluid or soft tissue mass involving the left 

lung base with mass effect. This measures 12.2 x 12.6 x 7.4 cm. 


4.4 cm ascending aortic aneurysm.





CV:  


Pulmonary hypertension, most likely secondary from chronic hypoxia by echo


Moderate aortic stenosis mild to moderate aortic regurgitation


Monitor HR and BP and maintain MAP>65mmHg


Echo 10/27 - systolic function grossly normal. Moderate AS/AI.  Moderate to 

severe pulmonary hypertension.. 





GI: 


Nothing by mouth





: 


Hyponatremia ?chronic but chronicity not truly known


Hypokalemia, resolved





ID: 


UTI


Possible pneumonia


Continue Rocephin for now


UTI present on admission with culture positive for pansensitive Klebsiella.


Influenza screen, blood and pleural fluid cultures negative to date. 


Temporal fluid studies from November 8, on cultures negative to date





Endo: SSI with Accu-Cheks for glycemic control.





Heme: 


Macrocytic anemia 


- Monitor CBC.  Check B12, check iron studies





DVT prophylaxis with  SCDs. Start Lovenox 40 mg sq daily. IV Famotidine 20 mg 

IV q12h





CCT 45 MIN





Problem Qualifiers





(1) COPD (chronic obstructive pulmonary disease):  


Qualified Codes:  J43.1 - Panlobular emphysema








Kalin Black MD Nov 12, 2017 10:33

## 2017-11-13 NOTE — HHI.PR
Subjective


Remarks


S/P thoracotomy  and  decortication . Has  a chest tube in place. Seems  better

  today  and was weaned  off the vent .


Was extubated   today  and C XR  showed improved  aeration on left


No fever.





Objective





Vital Signs








  Date Time  Temp Pulse Resp B/P (MAP) Pulse Ox O2 Delivery O2 Flow Rate FiO2


 


11/13/17 15:00  99      


 


11/13/17 15:00     97 Room Air  


 


11/13/17 15:00 97.9 98 20 113/77 (89) 99   


 


11/13/17 11:25     98 Nasal Cannula 3.00 


 


11/13/17 11:04  106      


 


11/13/17 11:03     100 Nasal Cannula 3.00 


 


11/13/17 11:02 98.1 106 19 125/79 (94) 99   


 


11/13/17 08:15     100 Nasal Cannula 3.00 


 


11/13/17 08:13   16     


 


11/13/17 08:10     96 Nasal Cannula 3 


 


11/13/17 07:38     99   40


 


11/13/17 07:37 98.7 106 14 117/75 (89) 99   


 


11/13/17 07:36  103      


 


11/13/17 07:34        40


 


11/13/17 07:34     99 Mechanical Ventilator  40


 


11/13/17 07:00      Nasal Cannula  40


 


11/13/17 03:56  82      


 


11/13/17 03:52 98.0 87 14 124/81 (95) 99   


 


11/13/17 03:47     99   40


 


11/13/17 03:30        40


 


11/13/17 03:30     99 Mechanical Ventilator  40


 


11/13/17 00:43     99   40


 


11/12/17 23:30        40


 


11/12/17 23:30     99 Mechanical Ventilator  40


 


11/12/17 23:30  79      


 


11/12/17 23:30 98.1 89 22 107/66 (80) 99   


 


11/12/17 23:06     100   40


 


11/12/17 20:15     99   40


 


11/12/17 19:45        40


 


11/12/17 19:45  78      


 


11/12/17 19:45     99 Mechanical Ventilator  40


 


11/12/17 19:45 99.1 89 14 84/56 (65) 99   














I/O      


 


 11/12/17 11/12/17 11/12/17 11/13/17 11/13/17 11/13/17





 07:00 15:00 23:00 07:00 15:00 23:00


 


Intake Total 917 ml  1348 ml 1372 ml 850 ml 880 ml


 


Output Total 675 ml  1555 ml 380 ml  340 ml


 


Balance 242 ml  -207 ml 992 ml 850 ml 540 ml


 


      


 


Intake Oral 480 ml   0 ml  300 ml


 


IV Total 437 ml  1348 ml 1312 ml 850 ml 500 ml


 


Tube Irrigant    60 ml  80 ml


 


Output Urine Total 675 ml  1275 ml 300 ml  250 ml


 


Gastric Drainage Total    0 ml  0 ml


 


Chest Tube Drainage Total   280 ml 80 ml  90 ml


 


# Bowel Movements    0  0








Result Diagram:  


11/13/17 0600                                                                  

              11/13/17 0445





Objective Remarks


GENERAL:  This is an averagely built middle-aged white female who is alert


HEENT:  Head normocephalic.  Pupils are reactive and equal.  Tongue moist.


Throat is clear.  Nasal mucosa dry.


NECK:  Supple.  No bruits, thyroid enlargement or lymphadenopathy.


CHEST:  Distant breath sounds at the left  lung base and occ  crackles left  

chest .


HEART:  The heart sounds are irregular, S1-S2.  No murmur.  No S3.


ABDOMEN:  Soft and nontender.  No organomegaly.  Bowel sounds are active.


EXTREMITIES:  Edema 1+ with diminished peripheral pulses. Bilateral Foot  

deformity.  NEUROLOGIC:


Reflexes are 1+ . No deficits


SKIN: Dry and scaly.





Assessment and Plan


Assessment and Plan





 


IMPRESSION


1.  COPD with acute exacerbation, resolved


2.  S/p decortication  left  Chest


3.  Atelectasis left lower lobe with mucus plugging.


4.  Hyponatremia.


5.  Abnormal liver enzymes


6.  Anemia and chronic disease.











Plan :








1. O2  3 L.





2. IS at bedside q3h





3. Cont  antibiotics.





4. Duoneb   nebs tid.





5. CXR , CBC  in am





6. Mucomyst 20 % nebs q6h.





7. Cont Lasix  20 mg daily.











NATA Grey MD Nov 13, 2017 19:23

## 2017-11-13 NOTE — RADRPT
EXAM DATE/TIME:  11/13/2017 04:58 

 

[facility] COMPARISON:     

CHEST SINGLE AP, November 12, 2017, 10:36.  CHEST SINGLE AP, November 12, 2017, 10:06.  CHEST SINGLE 
AP, November 11, 2017, 4:43.

 

                     

INDICATIONS :     

Short of breath.

                     

 

MEDICAL HISTORY :     

Chronic obstructive pulmonary disease.          

 

SURGICAL HISTORY :        

Thoracotomy

 

ENCOUNTER:     

Subsequent                                        

 

ACUITY:     

2 weeks      

 

PAIN SCORE:     

0/10

 

LOCATION:     

Bilateral  chest

 

FINDINGS:     

A single view of the chest demonstrates the endotracheal tube, nasogastric tube, left chest tube and 
right IJ central line in good position. There is a small residual left pleural effusion. There is vol
ume loss of the left lung. The right lung is clear.  The cardiomediastinal contours are unremarkable.
  Osseous structures are intact.

 

CONCLUSION:     

Marked volume loss left chest. Left chest tubes are identified both superiorly and inferiorly.

 

 

 

 Ernesto Wyman MD on November 13, 2017 at 6:25           

Board Certified Radiologist.

 This report was verified electronically.

## 2017-11-13 NOTE — EKG
Date Performed: 11/12/2017       Time Performed: 09:58:50

 

PTAGE:      56 years

 

EKG:      Sinus tachycardia. Lateral T wave changes are nonspecific Borderline ECG

 

PREVIOUS TRACING        11/7/2017 21.20 Since previous tracing, no significant change noted

 

DOCTOR:   Claudine Brooks  Interpretating Date/Time  11/13/2017 19:29:43

## 2017-11-13 NOTE — HHI.CCPN
Subjective


Remarks/Hospital Course


The patient is a 56-year-old female with past medical history of COPD, who 

presented to [facility] ED with complaints of coughing, wheezing. 

The patient states that she had food poisoning last week where she had nausea 

and diarrhea, after she ate suspicious food. She denies any worsening of 

dyspnea from baseline. In addition, she denies any constitutional symptoms. She 

quit smoking five and a half years ago and used to smoke one to two packs per 

day for about 30 years.  Chest x-ray in the ER showed consolidation with large 

effusion and volume loss on the left.  On further history she denies any 

hemoptysis, however, she reports 20 pounds weight loss in the last 4 months and 

decreased p.o. intake.  She is being followed up by Dr. Grey her outpatient 

pulmonologist.  The patient denies any use of oxygen at home, however, she is 

on bronchodilators p.r.n. Her laboratory data is significant for hyponatremia 

with sodium level 125, lactic acid level 2.1.  On arrival to the ER she was 

tachycardic and tachypneic.  When seen the patient is on 4 liters nasal cannula 

with saturation ranges between %.  In the ER she was given Rocephin, 

azithromycin, Solu-Medrol, bronchodilator treatment and IV fluids.  Her current 

blood pressure is 107/61.





10/27 Patient s/p CT guided thoracentesis and CT placement by IR yesterday. She 

is feeling better. 





Subjective:


10/28 She complains of pain and requests increased pain meds. Repeat imaging 

ordered per pulmonology to evaluate mass vs loculated effusion. On NC. 





[facility] RECONSULT NOTE 11/12/17


Critical care consulted for severe hypoxemic respiratory failure.  On 11/8/17 

patient had left thoracotomy for decortication, evacuation of complex loculated 

effusion, adhesiolysis by Dr. Rodriguez.  CXR 11/11/17 showed near complete 

whiteout of the left lung-pulmonary Dr. Khalil is following.  Today a HALICAT 

was called as the patient was found profoundly hypoxemic in the 70s oxygen 

saturation.  She was placed on 100% percent nonrebreather with improvement in 

saturation only to 81%.  Stat ABG on 100% nonrebreather showed pH of 7.29 PCO2 

of 62 pO2 of 53 and oxygen saturation 79%.  Stat chest x-ray showed complete 

whiteout of the left lung.  I discussed with the patient briefly, she is 

agreeable for intubation.  I proceeded with endotracheal intubation and placed 

on mechanical ventilation -patient saturation gradually improved to 100%.  

Discussed with pulmonology Dr. Khalil.  He plans to do bronchoscopy today 

afternoon. 





SUBJ 11/13: Patient had bronchoscopy by Dr. Khalil yesterday.  Chest x-ray 

today shows improvement in aeration but still with volume loss left lower lung 

fields which is unchanged from the post thoracotomy chest x-rays.  Wide-awake 

on vent tolerating CPAP will proceed with weaning trials





Objective





Vital Signs








  Date Time  Temp Pulse Resp B/P (MAP) Pulse Ox O2 Delivery O2 Flow Rate FiO2


 


11/13/17 07:38     99   40


 


11/13/17 07:37 98.7 106 14 117/75 (89)    


 


11/13/17 07:34      Mechanical Ventilator  


 


11/12/17 08:00       15.00 














Intake and Output   


 


 11/13/17 11/13/17 11/14/17





 08:00 16:00 00:00


 


Intake Total 1372 ml  


 


Output Total 380 ml  


 


Balance 992 ml  








Result Diagram:  


11/13/17 0600                                                                  

              11/13/17 0445





Other Results





Laboratory Tests








Test


  11/12/17


09:49 11/12/17


11:20


 


Blood Gas Puncture Site RT RADIAL  LT RADIAL 


 


Blood Gas Patient Temperature 98.6  98.6 


 


Blood Gas HCO3


  29 mmol/L


(22-26) 28 mmol/L


(22-26)


 


Blood Gas Base Excess


  2.6 mmol/L


(-2-2) 2.8 mmol/L


(-2-2)


 


Blood Gas Oxygen Saturation 79 % ()  96 % () 


 


Arterial Blood pH


  7.29


(7.380-7.420) 7.37


(7.380-7.420)


 


Arterial Blood Partial


Pressure CO2 62 mmHg


(38-42) 48 mmHg


(38-42)


 


Arterial Blood Partial


Pressure O2 53 mmHg


() 128 mmHg


()


 


Arterial Blood Oxygen Content


  10.0 Vol %


(12.0-20.0) 11.3 Vol %


(12.0-20.0)


 


Arterial Blood


Carboxyhemoglobin 1.7 % (0-4) 


  1.5 % (0-4) 


 


 


Arterial Blood Methemoglobin 1.1 % (0-2)  1.1 % (0-2) 


 


Blood Gas Hemoglobin


  9.0 G/DL


(12.0-16.0) 8.2 G/DL


(12.0-16.0)


 


Oxygen Delivery Device NRB  VENTILATOR 


 


Blood Gas Inspired Oxygen 100 %  45 % 


 


Blood Gas Ventilator Setting  PRVC//16 








Imaging





Last Impressions








Chest Tube Insertion 10/26/17 1224 Signed





Impressions: 





 Service Date/Time:  Thursday, October 26, 2017 13:22 - CONCLUSION: 

Uncomplicated 





 CT-guided thoracentesis.     Jorge Carrillo MD 


 


Chest X-Ray 10/26/17 1003 Signed





Impressions: 





 Service Date/Time:  Thursday, October 26, 2017 10:24 - CONCLUSION:  

Combination 





 of extensive consolidation, large effusion and volume loss on the left.     

Wayne Bell MD 


 


Chest CT 10/26/17 0000 Signed





Impressions: 





 Service Date/Time:  Thursday, October 26, 2017 13:07 - CONCLUSION:  1. Either 





 highly complex fluid or soft tissue mass involving the left lung base with 

mass 





 effect. This measures 12.2 x 12.6 x 7.4 cm. An ultrasound canal differentiate. 





 2. 7 mm nodule on the left upper lobe. 3. Material filling the left mainstem 





 bronchus either related to purulent material or mucus. There is resulting 

volume 





 loss of the entire left lung. 4. 4.4 cm ascending aortic aneurysm.     Efrain Gomez Jr., MD 








Objective Remarks


GENERAL: Patient is 55 yo female who is intubated and awake follows commands


SKIN: Warm and dry.


HEAD: Normocephalic.


EYES: No scleral icterus. No injection or drainage. 


NECK: Supple, trachea midline. No JVD or lymphadenopathy.


CARDIOVASCULAR: Regular rate and rhythm with 3/6 systolic murmur in all areas, 

predominantly L and R upper sternal borders


RESPIRATORY: Diminished breath sounds on the left. L chest tube to suction 

draining 360 ml blood tinged fluid in last 24 hours. 


GASTROINTESTINAL: Abdomen soft, non-tender, nondistended. Bowel sounds present.

  


MUSCULOSKELETAL: No cyanosis. Trace edema


NEURO: Awake and alert, moving all extremities with no focal deficit. Follows 

commands x4





Procedures


CT guided thoracentesis with L chest tube placement 10/26


Left thoracoscopic exploration, left thoracotomy for decortication, evacuation 

of complex loculated effusion, adhesiolysis 11/8/17





A/P


Problem List:  


(1) Acute hypoxemic respiratory failure


ICD Code:  J96.01 - Acute respiratory failure with hypoxia


(2) Atelectasis of left lung


ICD Code:  J98.11 - Atelectasis


(3) Pleural effusion on left


ICD Code:  J90 - Pleural effusion, not elsewhere classified


Status:  Acute


(4) Pain


ICD Code:  R52 - Pain, unspecified


Status:  Acute


(5) Tobacco abuse


ICD Code:  Z72.0 - Tobacco use


Status:  Chronic


(6) COPD (chronic obstructive pulmonary disease)


ICD Code:  J44.9 - Chronic obstructive pulmonary disease, unspecified


Status:  Chronic


(7) Hyponatremia


ICD Code:  E87.1 - Hypo-osmolality and hyponatremia


Status:  Acute


(8) Respiratory distress


ICD Code:  R06.00 - Dyspnea, unspecified


Status:  Resolved


(9) UTI (urinary tract infection)


ICD Code:  N39.0 - Urinary tract infection, site not specified


Status:  Acute


(10) Physical deconditioning


ICD Code:  R53.81 - Other malaise


Status:  Chronic


Assessment and Plan


ASSESSMENT 


Acute hypoxemic respiratory failure


Complete left lung atelectasis


Left-sided consolidation with pleural effusion


Left thoracotomy with decortication, evacuation of complex loculated effusion, 

adhesiolysis


Pulmonary hypertension


COPD exacerbation


Hyponatremia/hypophosphatemia


Hypokalemia.


Urinary tract infection on abx








PLAN:


Neuro:


Hold propofol and reduce fentanyl to facilitate weaning trials


Pain control with fentanyl





Pulm: 


Acute hypoxemic respiratory failure


Complete L lung atelectasis


S/p decortication for L hemothorax


COPD


Tobacco abuse


?L lung mass vs loculated effusion/


Emergently intubated and placed on mechanical ventilation 11/12


s/p Bronchoscopy by Dr. Grey.  Left lung fields reexpanded, persistent 

volume loss involving left lower lung field


Start weaning trials with possible extubation


Status post left thoracotomy and decortication 11/8/17-postop diagnosis was 

hemothorax


Bronchodilators, Solumedrol 60 mg IV q8 hours 


s/p CT guided left-sided thoracentesis and CT placement by IR 10/26/17


CT chest: Either highly complex fluid or soft tissue mass involving the left 

lung base with mass effect. This measures 12.2 x 12.6 x 7.4 cm. 


4.4 cm ascending aortic aneurysm.





CV:  


Pulmonary hypertension, most likely secondary from chronic hypoxia by echo


Moderate aortic stenosis mild to moderate aortic regurgitation


Monitor HR and BP and maintain MAP>65mmHg


Echo 10/27 - systolic function grossly normal. Moderate AS/AI.  Moderate to 

severe pulmonary hypertension.. 





GI: 


Nothing by mouth


Tube feeds if not extubated





: 


Hyponatremia ?chronic but chronicity not truly known


Hypokalemia, resolved





ID: 


UTI


Possible pneumonia


Continue Rocephin for now


UTI present on admission with culture positive for pansensitive Klebsiella.


Influenza screen, blood and pleural fluid cultures negative to date. 


Temporal fluid studies from November 8, on cultures negative to date





Endo: 


SSI with Accu-Cheks for glycemic control.





Heme: 


Macrocytic anemia 


Anemia of chronic disease and iron deficiency


Monitor CBC.  Normal B12, 


Supplement iron due to her lab findings consistent with iron deficiency





DVT prophylaxis with  SCDs. Lovenox 40 mg sq daily. IV Famotidine 20 mg IV q12h





Level 2





Problem Qualifiers





(1) COPD (chronic obstructive pulmonary disease):  


Qualified Codes:  J43.1 - Panlobular emphysema








Kalin Black MD Nov 13, 2017 07:53

## 2017-11-14 NOTE — PD.ONC.PN
Subjective


Subjective


Remarks


Afebrile overnight. 


Patient feeling good after blood transfusion this AM. 


Excited to be transferred out of CVICU.





Objective


Data











  Date Time  Temp Pulse Resp B/P (MAP) Pulse Ox O2 Delivery O2 Flow Rate FiO2


 


11/14/17 11:17  120      


 


11/14/17 11:17 97.9 120 20 95/68 (77) 99   


 


11/14/17 11:16     99 Nasal Cannula 2.00 


 


11/14/17 08:37 98.2 104 16 104/62    


 


11/14/17 08:36 98.2 106 20 104/68    


 


11/14/17 08:31     95 Nasal Cannula 2.00 


 


11/14/17 08:31   20     


 


11/14/17 08:26 97.9 110 20 114/76 95   


 


11/14/17 07:23     95 Nasal Cannula 2.00 


 


11/14/17 07:22  104      


 


11/14/17 07:20 97.9 120 24 107/74 (85) 95   


 


11/14/17 03:19  85      


 


11/14/17 03:07 98.3 78 16 89/58 (68) 99   


 


11/14/17 03:07     99 Nasal Cannula  


 


11/13/17 23:42 97.8 91 16 88/57 (67) 99   


 


11/13/17 23:42     99 Nasal Cannula  


 


11/13/17 23:30  88      


 


11/13/17 21:58     99 Nasal Cannula 2.00 


 


11/13/17 21:29     97 Nasal Cannula 2.00 


 


11/13/17 21:15     87 Nasal Cannula 2.00 


 


11/13/17 19:49 98.2 113 18 102/70 (81) 93   


 


11/13/17 19:49     93 Room Air  


 


11/13/17 19:00  105      


 


11/13/17 15:00  99      


 


11/13/17 15:00     97 Room Air  


 


11/13/17 15:00 97.9 98 20 113/77 (89) 99   














 11/14/17 11/14/17 11/14/17





 07:00 15:00 23:00


 


Intake Total 420 ml 920 ml 


 


Output Total 750 ml  


 


Balance -330 ml 920 ml 








Result Diagram:  


11/14/17 0418                                                                  

              11/14/17 0418





Laboratory Results





Laboratory Tests








Test


  11/14/17


04:18


 


White Blood Count 3.3 TH/MM3 


 


Red Blood Count 1.99 MIL/MM3 


 


Hemoglobin 7.0 GM/DL 


 


Hematocrit 20.5 % 


 


Mean Corpuscular Volume 103.4 FL 


 


Mean Corpuscular Hemoglobin 35.1 PG 


 


Mean Corpuscular Hemoglobin


Concent 33.9 % 


 


 


Red Cell Distribution Width 21.8 % 


 


Platelet Count 45 TH/MM3 


 


Mean Platelet Volume 9.4 FL 


 


Neutrophils (%) (Auto) 84.6 % 


 


Lymphocytes (%) (Auto) 10.5 % 


 


Monocytes (%) (Auto) 4.9 % 


 


Eosinophils (%) (Auto) 0.0 % 


 


Basophils (%) (Auto) 0.0 % 


 


Neutrophils # (Auto) 2.8 TH/MM3 


 


Lymphocytes # (Auto) 0.3 TH/MM3 


 


Monocytes # (Auto) 0.2 TH/MM3 


 


Eosinophils # (Auto) 0.0 TH/MM3 


 


Basophils # (Auto) 0.0 TH/MM3 


 


CBC Comment AUTO DIFF 


 


Differential Comment


  AUTO DIFF


CONFIRMED


 


Platelet Estimate LOW 


 


Platelet Morphology Comment NORMAL 


 


Target Cells  


 


Blood Urea Nitrogen 9 MG/DL 


 


Creatinine 0.41 MG/DL 


 


Random Glucose 131 MG/DL 


 


Total Protein 4.2 GM/DL 


 


Albumin 1.8 GM/DL 


 


Calcium Level 7.7 MG/DL 


 


Phosphorus Level 1.9 MG/DL 


 


Magnesium Level 2.1 MG/DL 


 


Alkaline Phosphatase 123 U/L 


 


Aspartate Amino Transf


(AST/SGOT) 19 U/L 


 


 


Alanine Aminotransferase


(ALT/SGPT) 19 U/L 


 


 


Total Bilirubin 0.4 MG/DL 


 


Sodium Level 136 MEQ/L 


 


Potassium Level 3.6 MEQ/L 


 


Chloride Level 102 MEQ/L 


 


Carbon Dioxide Level 28.1 MEQ/L 


 


Anion Gap 6 MEQ/L 


 


Estimat Glomerular Filtration


Rate 160 ML/MIN 


 


 


Lactic Acid Level 0.7 mmol/L 


 


Random Cortisol 11.9 MCG/DL 








Culture Results





Microbiology








 Date/Time


Source Procedure


Growth Status


 


 


 11/12/17 11:15


Sputum Endotracheal Gram Stain - Final Complete


 


 11/12/17 11:15


Sputum Endotracheal Sputum Culture - Final


RARE GROWTH NORMAL RESPIRATORY JULIAN Complete








Imaging Studies





Last 24 hours Impressions








Chest X-Ray 11/14/17 0000 Signed





Impressions: 





 Service Date/Time:  Tuesday, November 14, 2017 09:04 - CONCLUSION:  1. 





 Increasing atelectasis/consolidation of the left lung. 2. Left chest tube and 





 central venous catheter in good position. 3. The ET tube has been removed     





 Elias Oswald MD 











Administered Medications








 Medications


  (Trade)  Dose


 Ordered  Sig/Emre


 Route


 PRN Reason  Start Time


 Stop Time Status Last Admin


Dose Admin


 


 Sennosides


  (Senokot)  17.2 mg  Q12H  PRN


 PO


 Moderate constipation  10/26/17 11:30


    11/14/17 02:19


 


 


 Nifedipine


  (Procardia Xl)  30 mg  DAILY


 PO


   11/3/17 09:00


    11/14/17 08:32


 


 


 Furosemide


  (Lasix)  20 mg  DAILY


 PO


   11/2/17 17:35


   Future Hold 11/12/17 09:21


 


 


 Prednisone


  (Deltasone)  10 mg  DAILY


 PO


   11/5/17 09:00


   Future Hold 11/12/17 09:21


 


 


 Lactulose


  (Lactulose Liq)  30 ml  QID  PRN


 PO


 severe constipation   11/7/17 13:00


    11/8/17 08:42


 


 


 Cefazolin Sodium/


 Dextrose  50 ml @ 


 150 mls/hr  ON  CALL


 IV


   11/7/17 16:15


 11/14/17 16:14  11/8/17 16:17


 


 


 Albuterol Sulfate


  (Albuterol Neb)  2.5 mg  Q2HR NEB  PRN


 NEB


 WHEEZING  11/8/17 19:30


    11/12/17 10:55


 


 


 IV Flush


  (NS Flush)  2 ml  BID


 IV FLUSH


   11/8/17 21:00


    11/14/17 08:32


 


 


 Pantoprazole


 Sodium


  (Protonix)  40 mg  HS


 PO


   11/8/17 21:00


    11/13/17 20:34


 


 


 Ondansetron HCl


  (Zofran Inj)  4 mg  Q6H  PRN


 IV PUSH


 NAUSEA OR VOMITING  11/8/17 19:30


    11/12/17 05:58


 


 


 Sodium Chloride  1,000 ml @ 


 30 mls/hr  Q24H


 IV


   11/8/17 21:00


    11/11/17 21:00


 


 


 Polyethylene


 Glycol


  (Miralax)  17 gm  DAILY


 PO


   11/9/17 09:00


    11/14/17 08:32


 


 


 Potassium Chloride


  (KCl)  10 meq  DAILY


 PO


   11/9/17 09:00


    11/14/17 08:32


 


 


 Ceftriaxone


 Sodium 1000 mg/


 Sodium Chloride  100 ml @ 


 200 mls/hr  Q24H


 IV


   11/9/17 23:00


    11/13/17 23:27


 


 


 Chlorhexidine


 Gluconate


  (Peridex 0.12%


 Liq)  15 ml  BID@08,20


 MT


   11/12/17 20:00


    11/12/17 21:32


 


 


 Albuterol/


 Ipratropium


  (Duoneb Neb)  1 ampule  Q4HR  NEB


 NEB


   11/12/17 12:00


    11/14/17 12:25


 


 


 Methylprednisolone


 Sodium Succinate


  (SoluMEDROL INJ)  60 mg  Q8H


 IV PUSH


   11/12/17 12:00


    11/14/17 12:20


 


 


 Enoxaparin Sodium


  (Lovenox Inj)  40 mg  Q24H


 SQ


   11/12/17 12:00


   Future Hold 11/12/17 12:54


 


 


 Non-Formulary


 Medication  2 ML NEB


 EVERY 6


 HOURS  Q6HR  NEB


 NEB


   11/12/17 18:00


 11/15/17 15:59  11/14/17 04:25


 


 


 Lactulose


  (Lactulose Liq)  30 ml  DAILY


 NG


   11/13/17 09:00


    11/14/17 08:32


 


 


 Docusate Sodium


  (Colace)  100 mg  BID


 PO


   11/13/17 09:00


    11/14/17 08:32


 


 


 Ferrous Sulfate


  (Ferrous Sulfate)  325 mg  BID


 PO


   11/13/17 21:00


    11/14/17 08:32


 


 


 Oxycodone/


 Acetaminophen


  (Percocet


 7.5-325 Mg)  1 tab  Q6H  PRN


 PO


 pain 5-10  11/13/17 18:15


    11/14/17 07:29


 








Objective Remarks


GENERAL: Middle aged female sitting up in chair next to bed in nad.


SKIN: Warm and dry.


HEAD: Normocephalic.


EYES: No injection or drainage. 


NECK: Supple, trachea midline. 


CARDIOVASCULAR: +S1/S2


RESPIRATORY: diminished at left lung base. anterior fields clear. 


GASTROINTESTINAL: Abdomen soft, non-tender, nondistended. 


EXTREMITIES: No cyanosis


NEUROLOGICAL: No obvious focal deficit. Awake, alert, and oriented x3.





Assessment/Plan


Problem List:  


(1) Pancytopenia


ICD Codes:  D61.818 - Other pancytopenia


Plan:  11/14: agree with blood transfusion this AM. monitor CBC, recommend bone 

marrow biopsy when feeling improved. 


--Pancytopenia with macrocytosis and normal B12.  


--suspect that she has myelodysplastic syndrome until proven otherwise.


--will need bone marrow biopsy and aspirate. (declining at this time)


--iron studies are consistent with anemia of chronic disease 


--blood transfusion if the hemoglobin is less than 8 and platelet transfusion 

if the platelet count is less than 15 or any bleeding .





Assessment


57y/o female with pancytopenia with macrocytosis.


h/o COPD


Attending Statement


The exam, history, and the medical decision-making described in the above note 

were completed with the assistance of the mid-level provider. I reviewed and 

agree with the findings presented.  I attest that I had a face-to-face 

encounter with the patient on the same day, and personally performed and 

documented my assessment and findings in the medical record.


No new complaints


Fees better


PRBC Today


Monitor CBC











Leidy Mclaughlin Nov 14, 2017 13:16


Velma Kapoor MD Nov 14, 2017 21:35

## 2017-11-14 NOTE — HHI.CCPN
Subjective


Remarks/Hospital Course


The patient is a 56-year-old female with past medical history of COPD, who 

presented to [facility] ED with complaints of coughing, wheezing. 

The patient states that she had food poisoning last week where she had nausea 

and diarrhea, after she ate suspicious food. She denies any worsening of 

dyspnea from baseline. In addition, she denies any constitutional symptoms. She 

quit smoking five and a half years ago and used to smoke one to two packs per 

day for about 30 years.  Chest x-ray in the ER showed consolidation with large 

effusion and volume loss on the left.  On further history she denies any 

hemoptysis, however, she reports 20 pounds weight loss in the last 4 months and 

decreased p.o. intake.  She is being followed up by Dr. Grey her outpatient 

pulmonologist.  The patient denies any use of oxygen at home, however, she is 

on bronchodilators p.r.n. Her laboratory data is significant for hyponatremia 

with sodium level 125, lactic acid level 2.1.  On arrival to the ER she was 

tachycardic and tachypneic.  When seen the patient is on 4 liters nasal cannula 

with saturation ranges between %.  In the ER she was given Rocephin, 

azithromycin, Solu-Medrol, bronchodilator treatment and IV fluids.  Her current 

blood pressure is 107/61.





10/27 Patient s/p CT guided thoracentesis and CT placement by IR yesterday. She 

is feeling better. 





Subjective:


10/28 She complains of pain and requests increased pain meds. Repeat imaging 

ordered per pulmonology to evaluate mass vs loculated effusion. On NC. 





[facility] RECONSULT NOTE 11/12/17


Critical care consulted for severe hypoxemic respiratory failure.  On 11/8/17 

patient had left thoracotomy for decortication, evacuation of complex loculated 

effusion, adhesiolysis by Dr. Rodriguez.  CXR 11/11/17 showed near complete 

whiteout of the left lung-pulmonary Dr. Khalil is following.  Today a HALICAT 

was called as the patient was found profoundly hypoxemic in the 70s oxygen 

saturation.  She was placed on 100% percent nonrebreather with improvement in 

saturation only to 81%.  Stat ABG on 100% nonrebreather showed pH of 7.29 PCO2 

of 62 pO2 of 53 and oxygen saturation 79%.  Stat chest x-ray showed complete 

whiteout of the left lung.  I discussed with the patient briefly, she is 

agreeable for intubation.  I proceeded with endotracheal intubation and placed 

on mechanical ventilation -patient saturation gradually improved to 100%.  

Discussed with pulmonology Dr. Khalil.  He plans to do bronchoscopy today 

afternoon. 





SUBJ 11/13: Patient had bronchoscopy by Dr. Khalil yesterday.  Chest x-ray 

today shows improvement in aeration but still with volume loss left lower lung 

fields which is unchanged from the post thoracotomy chest x-rays.  Wide-awake 

on vent tolerating CPAP will proceed with weaning trials.





11/14: Extubated yesterday, Out of bed in chair currently.  Transfused 1 unit 

PRBCs.  On nasal cannula.





Objective





Vital Signs








  Date Time  Temp Pulse Resp B/P (MAP) Pulse Ox O2 Delivery O2 Flow Rate FiO2


 


11/14/17 11:17  120      


 


11/14/17 11:17 97.9  20 95/68 (77) 99   


 


11/14/17 11:16      Nasal Cannula 2.00 


 


11/13/17 07:38        40














Intake and Output   


 


 11/14/17 11/14/17 11/15/17





 08:00 16:00 00:00


 


Intake Total 920 ml 420 ml 


 


Output Total 750 ml  


 


Balance 170 ml 420 ml 








Result Diagram:  


11/14/17 0418                                                                  

              11/14/17 0418





Other Results





Microbiology








 Date/Time


Source Procedure


Growth Status


 


 


 11/12/17 11:15


Sputum Endotracheal Gram Stain - Final Complete


 


 11/12/17 11:15


Sputum Endotracheal Sputum Culture - Final


RARE GROWTH NORMAL RESPIRATORY JULIAN Complete








Imaging





Last Impressions








Chest Tube Insertion 10/26/17 1224 Signed





Impressions: 





 Service Date/Time:  Thursday, October 26, 2017 13:22 - CONCLUSION: 

Uncomplicated 





 CT-guided thoracentesis.     Jorge Carrillo MD 


 


Chest X-Ray 10/26/17 1003 Signed





Impressions: 





 Service Date/Time:  Thursday, October 26, 2017 10:24 - CONCLUSION:  

Combination 





 of extensive consolidation, large effusion and volume loss on the left.     

Wayne Bell MD 


 


Chest CT 10/26/17 0000 Signed





Impressions: 





 Service Date/Time:  Thursday, October 26, 2017 13:07 - CONCLUSION:  1. Either 





 highly complex fluid or soft tissue mass involving the left lung base with 

mass 





 effect. This measures 12.2 x 12.6 x 7.4 cm. An ultrasound canal differentiate. 





 2. 7 mm nodule on the left upper lobe. 3. Material filling the left mainstem 





 bronchus either related to purulent material or mucus. There is resulting 

volume 





 loss of the entire left lung. 4. 4.4 cm ascending aortic aneurysm.     Efrain Gomez Jr., MD 








Objective Remarks


GENERAL: Patient is 55 yo female who is intubated and awake follows commands


SKIN: Warm and dry.


HEAD: Normocephalic.


EYES: No scleral icterus. No injection or drainage. 


NECK: Supple, trachea midline. No JVD or lymphadenopathy.


CARDIOVASCULAR: Regular rate and rhythm with 3/6 systolic murmur in all areas, 

predominantly L and R upper sternal borders


RESPIRATORY: Diminished breath sounds on the left. L chest tube to suction 

draining 360 ml blood tinged fluid in last 24 hours. 


GASTROINTESTINAL: Abdomen soft, non-tender, nondistended. Bowel sounds present.

  


MUSCULOSKELETAL: No cyanosis. Trace edema


NEURO: Awake and alert, moving all extremities with no focal deficit. Follows 

commands x4





Procedures


CT guided thoracentesis with L chest tube placement 10/26


Left thoracoscopic exploration, left thoracotomy for decortication, evacuation 

of complex loculated effusion, adhesiolysis 11/8/17 11-12 INTUBATED


11-13 EXTUBATED


11-13 BRONCHOSCOPY WITH BAL OF LEFT SIDE





A/P


Problem List:  


(1) Acute hypoxemic respiratory failure


ICD Code:  J96.01 - Acute respiratory failure with hypoxia


(2) Atelectasis of left lung


ICD Code:  J98.11 - Atelectasis


(3) Pleural effusion on left


ICD Code:  J90 - Pleural effusion, not elsewhere classified


Status:  Acute


(4) Pain


ICD Code:  R52 - Pain, unspecified


Status:  Acute


(5) Tobacco abuse


ICD Code:  Z72.0 - Tobacco use


Status:  Chronic


(6) COPD (chronic obstructive pulmonary disease)


ICD Code:  J44.9 - Chronic obstructive pulmonary disease, unspecified


Status:  Chronic


(7) Hyponatremia


ICD Code:  E87.1 - Hypo-osmolality and hyponatremia


Status:  Acute


(8) Respiratory distress


ICD Code:  R06.00 - Dyspnea, unspecified


Status:  Resolved


(9) UTI (urinary tract infection)


ICD Code:  N39.0 - Urinary tract infection, site not specified


Status:  Acute


(10) Physical deconditioning


ICD Code:  R53.81 - Other malaise


Status:  Chronic


Assessment and Plan


ASSESSMENT 


Acute hypoxemic respiratory failure


Complete left lung atelectasis


Left-sided consolidation with pleural effusion


Left thoracotomy with decortication, evacuation of complex loculated effusion, 

adhesiolysis


Pulmonary hypertension


COPD exacerbation


Hyponatremia/hypophosphatemia


Hypokalemia.


Urinary tract infection on abx








PLAN:


Neuro:


Hold propofol and reduce fentanyl to facilitate weaning trials


Pain control with fentanyl





Pulm: 


Acute hypoxemic respiratory failure


Complete L lung atelectasis


S/p decortication for L hemothorax


COPD


Tobacco abuse


?L lung mass vs loculated effusion/


Emergently intubated and placed on mechanical ventilation 11/12


s/p Bronchoscopy by Dr. Grey.  Left lung fields reexpanded, persistent 

volume loss involving left lower lung field


extubated on 11/13, tolerating nasal cannula.


Status post left thoracotomy and decortication 11/8/17-postop diagnosis was 

hemothorax


Bronchodilators, Solumedrol 60 mg IV q8 hours 


s/p CT guided left-sided thoracentesis and CT placement by IR 10/26/17


CT chest: Either highly complex fluid or soft tissue mass involving the left 

lung base with mass effect. This measures 12.2 x 12.6 x 7.4 cm. 


4.4 cm ascending aortic aneurysm.





CV:  


Pulmonary hypertension, most likely secondary from chronic hypoxia by echo


Moderate aortic stenosis mild to moderate aortic regurgitation


Monitor HR and BP and maintain MAP>65mmHg


Echo 10/27 - systolic function grossly normal. Moderate AS/AI.  Moderate to 

severe pulmonary hypertension.. 





GI: 


PO diet as tolerated.





: 


Hyponatremia ?chronic but chronicity not truly known


Hypokalemia, resolved





ID: 


UTI


Possible pneumonia


Continue Rocephin for now


UTI present on admission with culture positive for pansensitive Klebsiella.


Influenza screen, blood and pleural fluid cultures negative to date. 


Temporal fluid studies from November 8, on cultures negative to date





Endo: 


SSI with Accu-Cheks for glycemic control.





Heme: 


Macrocytic anemia 


Anemia of chronic disease and iron deficiency


Monitor CBC.  Normal B12, 


Supplement iron due to her lab findings consistent with iron deficiency





DVT prophylaxis with  SCDs. Lovenox 40 mg sq daily. IV Famotidine 20 mg IV q12h





Level 2





Problem Qualifiers





(1) COPD (chronic obstructive pulmonary disease):  


Qualified Codes:  J43.1 - Panlobular emphysema








Tremaine Benitez MD Nov 14, 2017 13:18

## 2017-11-14 NOTE — PD.CAR.PN
CVT Progress Note


Subjective/Hospital Course:


56-year-old white female with a history of COPD, chronic bronchitis who 

apparently was suffering from food poisoning.  The patient states that she was 

unable to pass urine and became very weak, had lost weight with diarrhea, 

abdominal discomfort, dehydration and presented to ER for evaluation. She c/o 

20 lb weight loss over the past 4 months. Chest CT done showed evidence of a 

loculated  mass-like infiltrate in the left lung base 12 x 12 cm and a left 

upper lobe lung nodule 7 mm.  The patient was started on Solu-Medrol, IV 

antibiotics including Rocephin and Zithromax.  Denies chest pains or abdominal 

pains or nausea or vomiting. She underwent chest tube placement in IR 10/26


with minimal drainage of this effusion.. Chest tube was removed. Repeat chest x-

ray shows no change in the mod pleural effusion but does shows some mild patchy 

opacities in the right lung base. 


cardiology Dr Sebastián de la torre pt   TTE shows preserved EF and at least moderate AS/AI.





present diagnosis : Respiratory Insufficiency, Left-sided consolidation , 

Exudative pleural effusion, COPD exacerbation, Urinary tract infection, x 

tobacco use


Anemia, thrombocytopenia, Moderate aortic valve regurgitation, Moderate 

calcific aortic valve stenosis





pleural fluid : exudative effusion. Cytology neg for malignant cells. Fluid cx 

neg 


CT chest 10/26 : Either highly complex fluid or soft tissue mass involving the 

left lung base with mass effect. This measures 12.2 x 12.6 x 7.4 cm.  4.4 cm 

ascending aortic aneurysm.





pt is now agreeable for surgery : plan is for left thoracoscopic exploration 

for loculated pleural effusion , possible  thoracotomy , decortication in am 





11/8


on nasal cannula 


surgery: Left thoracoscopic exploration, left thoracotomy for decortication, 

evacuation of complex loculated effusion, adhesiolysis





11/9


pain controlled with PCA morphine 


needs aggressive pulm toileting 


OOB, ambulate 


recheck UA / pleural fluid cultures and path pending 





11/10


 chest tubes drained 130cc/ 12 hrs serous drainage / leave chest tube in for 

now 


CXR noted , from 11/9, mucus plugging, aggressive pulm toileting 


add ezpap and acapella 


now on Rocephin, will dc ancef, await on UA culture 





OOB, ambulate, 





11/11/17


No complaints today.  Denies dyspnea, but CXR shows complete opacification of 

left lung





11/12/17


Hypoxic this morning despite 100% FiO2.  Transferred to CVICU and reintubated.  

Bronchoscopy pending.





11/13


remains in CVICU, hematology consulted 


s/p Bronch 


chest tube to water seal 





11/14


now on nasal cannula 2 liters 


continue pulm toileting 


appreciate Hematology 





will transfer to stepdown  


continue PT/OT


Objective:





Vital Signs








  Date Time  Temp Pulse Resp B/P (MAP) Pulse Ox O2 Delivery O2 Flow Rate FiO2


 


11/14/17 11:17  120      


 


11/14/17 11:17 97.9 120 20 95/68 (77) 99   


 


11/14/17 11:16     99 Nasal Cannula 2.00 


 


11/14/17 08:37 98.2 104 16 104/62    


 


11/14/17 08:36 98.2 106 20 104/68    


 


11/14/17 08:31     95 Nasal Cannula 2.00 


 


11/14/17 08:31   20     


 


11/14/17 08:26 97.9 110 20 114/76 95   


 


11/14/17 07:23     95 Nasal Cannula 2.00 


 


11/14/17 07:22  104      


 


11/14/17 07:20 97.9 120 24 107/74 (85) 95   


 


11/14/17 03:19  85      


 


11/14/17 03:07 98.3 78 16 89/58 (68) 99   


 


11/14/17 03:07     99 Nasal Cannula  


 


11/13/17 23:42 97.8 91 16 88/57 (67) 99   


 


11/13/17 23:42     99 Nasal Cannula  


 


11/13/17 23:30  88      


 


11/13/17 21:58     99 Nasal Cannula 2.00 


 


11/13/17 21:29     97 Nasal Cannula 2.00 


 


11/13/17 21:15     87 Nasal Cannula 2.00 


 


11/13/17 19:49 98.2 113 18 102/70 (81) 93   


 


11/13/17 19:49     93 Room Air  


 


11/13/17 19:00  105      








Labs:





Laboratory Tests








Test


  11/14/17


04:18


 


White Blood Count


  3.3 TH/MM3


(4.0-11.0)


 


Red Blood Count


  1.99 MIL/MM3


(4.00-5.30)


 


Hemoglobin


  7.0 GM/DL


(11.6-15.3)


 


Hematocrit


  20.5 %


(35.0-46.0)


 


Mean Corpuscular Volume


  103.4 FL


(80.0-100.0)


 


Mean Corpuscular Hemoglobin


  35.1 PG


(27.0-34.0)


 


Mean Corpuscular Hemoglobin


Concent 33.9 %


(32.0-36.0)


 


Red Cell Distribution Width


  21.8 %


(11.6-17.2)


 


Platelet Count


  45 TH/MM3


(150-450)


 


Mean Platelet Volume


  9.4 FL


(7.0-11.0)


 


Neutrophils (%) (Auto)


  84.6 %


(16.0-70.0)


 


Lymphocytes (%) (Auto)


  10.5 %


(9.0-44.0)


 


Monocytes (%) (Auto)


  4.9 %


(0.0-8.0)


 


Eosinophils (%) (Auto)


  0.0 %


(0.0-4.0)


 


Basophils (%) (Auto)


  0.0 %


(0.0-2.0)


 


Neutrophils # (Auto)


  2.8 TH/MM3


(1.8-7.7)


 


Lymphocytes # (Auto)


  0.3 TH/MM3


(1.0-4.8)


 


Monocytes # (Auto)


  0.2 TH/MM3


(0-0.9)


 


Eosinophils # (Auto)


  0.0 TH/MM3


(0-0.4)


 


Basophils # (Auto)


  0.0 TH/MM3


(0-0.2)


 


CBC Comment AUTO DIFF 


 


Differential Comment


  AUTO DIFF


CONFIRMED


 


Platelet Estimate LOW (NORMAL) 


 


Platelet Morphology Comment


  NORMAL


(NORMAL)


 


Target Cells  (NORMAL) 


 


Blood Urea Nitrogen 9 MG/DL (7-18) 


 


Creatinine


  0.41 MG/DL


(0.50-1.00)


 


Random Glucose


  131 MG/DL


()


 


Total Protein


  4.2 GM/DL


(6.4-8.2)


 


Albumin


  1.8 GM/DL


(3.4-5.0)


 


Calcium Level


  7.7 MG/DL


(8.5-10.1)


 


Phosphorus Level


  1.9 MG/DL


(2.5-4.9)


 


Magnesium Level


  2.1 MG/DL


(1.5-2.5)


 


Alkaline Phosphatase


  123 U/L


()


 


Aspartate Amino Transf


(AST/SGOT) 19 U/L (15-37) 


 


 


Alanine Aminotransferase


(ALT/SGPT) 19 U/L (10-53) 


 


 


Total Bilirubin


  0.4 MG/DL


(0.2-1.0)


 


Sodium Level


  136 MEQ/L


(136-145)


 


Potassium Level


  3.6 MEQ/L


(3.5-5.1)


 


Chloride Level


  102 MEQ/L


()


 


Carbon Dioxide Level


  28.1 MEQ/L


(21.0-32.0)


 


Anion Gap 6 MEQ/L (5-15) 


 


Estimat Glomerular Filtration


Rate 160 ML/MIN


(>89)


 


Lactic Acid Level


  0.7 mmol/L


(0.4-2.0)


 


Random Cortisol 11.9 MCG/DL 








Result Diagram:  


11/14/17 0418 11/14/17 0418





Telemetry:


NSR





(1) COPD (chronic obstructive pulmonary disease)


Plan:   


I





(2) Tobacco abuse


Plan:  smoking cessation





(3) Physical deconditioning


Plan:  pt 





(4) Pleural effusion on left


Plan:  s/p left thoracoscopic exploration for loculated pleural effusion 


path negative for malignant cells 





cultures neg 





OOB PT/OT 





eval for rehab


remove chest tube in am 


allow pt to ambulate more today





s/p Bronch 11/13





cxr 11/14: 1. Increasing atelectasis/consolidation of the left lung.


2. Left chest tube and central venous catheter in good position.





 














(5) Pancytopenia


Plan:  appreciate hematology input 





--Pancytopenia with macrocytosis and normal B12.  


--suspect myelodysplastic syndrome until proven otherwise.


--will need bone marrow biopsy and aspirate. (declining at this time)


--iron studies are consistent with anemia of chronic disease 








--blood transfusion if the hemoglobin is less than 8 and platelet transfusion 

if the platelet count is less than 15 or any bleeding / per Heme 








Problem Qualifiers





(1) COPD (chronic obstructive pulmonary disease):  


Qualified Codes:  J43.1 - Panlobular emphysema








Terwilliger,Jacqueline R. ARNP Nov 14, 2017 15:18

## 2017-11-14 NOTE — RADRPT
EXAM DATE/TIME:  11/14/2017 09:04 

 

[facility] COMPARISON:     

CHEST SINGLE AP, November 13, 2017, 4:58.

 

                     

INDICATIONS :     

Atelectasis.

                     

 

MEDICAL HISTORY :     

Chronic obstructive pulmonary disease.          

 

SURGICAL HISTORY :        

Thoracotamy

 

ENCOUNTER:     

Initial                                        

 

ACUITY:     

2 weeks      

 

PAIN SCORE:     

0/10

 

LOCATION:     

Bilateral chest 

 

FINDINGS:     

The heart is mildly enlarged. There is a chest tube in place on the left. There is left pleural effus
ion and consolidation. The central venous catheter on the right is in satisfactory position. The ET t
ube has been removed. When compared to previous study there appears to be more atelectasis and effusi
on in the left lung than previous. There is mild hyperinflation of the right lung.

 

CONCLUSION:     

1. Increasing atelectasis/consolidation of the left lung.

2. Left chest tube and central venous catheter in good position.

3. The ET tube has been removed

 

 

 

 Elias Oswald MD on November 14, 2017 at 9:32           

Board Certified Radiologist.

 This report was verified electronically.

## 2017-11-14 NOTE — HHI.PR
Subjective


Remarks


The patient is a 56-year-old female with past medical history of COPD, who 

presented to [facility] ED with complaints of coughing, wheezing. 

The patient states that she had food poisoning last week where she had nausea 

and diarrhea, after she ate suspicious food. She denies any worsening of 

dyspnea from baseline. In addition, she denies any constitutional symptoms. She 

quit smoking five and a half years ago and used to smoke one to two packs per 

day for about 30 years.  Chest x-ray in the ER showed consolidation with large 

effusion and volume loss on the left.  On further history she denies any 

hemoptysis, however, she reports 20 pounds weight loss in the last 4 months and 

decreased p.o. intake.  She is being followed up by Dr. Grey her outpatient 

pulmonologist.  The patient denies any use of oxygen at home, however, she is 

on bronchodilators p.r.n. Her laboratory data is significant for hyponatremia 

with sodium level 125, lactic acid level 2.1.  On arrival to the ER she was 

tachycardic and tachypneic.  When seen the patient is on 4 liters nasal cannula 

with saturation ranges between %.  In the ER she was given Rocephin, 

azithromycin, Solu-Medrol, bronchodilator treatment and IV fluids.  Her current 

blood pressure is 107/61.





10/27 Patient s/p CT guided thoracentesis and CT placement by IR yesterday. She 

is feeling better. 





Subjective:


10/28 She complains of pain and requests increased pain meds. Repeat imaging 

ordered per pulmonology to evaluate mass vs loculated effusion. On NC. 





[facility] RECONSULT NOTE 11/12/17


Critical care consulted for severe hypoxemic respiratory failure.  On 11/8/17 

patient had left thoracotomy for decortication, evacuation of complex loculated 

effusion, adhesiolysis by Dr. Rodriguez.  CXR 11/11/17 showed near complete 

whiteout of the left lung-pulmonary Dr. Khalil is following.  Today a HALICAT 

was called as the patient was found profoundly hypoxemic in the 70s oxygen 

saturation.  She was placed on 100% percent nonrebreather with improvement in 

saturation only to 81%.  Stat ABG on 100% nonrebreather showed pH of 7.29 PCO2 

of 62 pO2 of 53 and oxygen saturation 79%.  Stat chest x-ray showed complete 

whiteout of the left lung.  I discussed with the patient briefly, she is 

agreeable for intubation.  I proceeded with endotracheal intubation and placed 

on mechanical ventilation -patient saturation gradually improved to 100%.  

Discussed with pulmonology Dr. Khalil.  He plans to do bronchoscopy today 

afternoon. 





SUBJ 11/13: Patient had bronchoscopy by Dr. Khalil yesterday.  Chest x-ray 

today shows improvement in aeration but still with volume loss left lower lung 

fields which is unchanged from the post thoracotomy chest x-rays.  Wide-awake 

on vent tolerating CPAP will proceed with weaning trials





11-14 TRANSFERRED BACK TO OUR SERVICE


EXTUBATED YESTERDAY


GETTING BLOOD 1 UNIT PRBC TODAY


PT AND OT


DW RN AND PT


LESS SOB THAN YESTERDAY





Objective


Vitals





Vital Signs








  Date Time  Temp Pulse Resp B/P (MAP) Pulse Ox O2 Delivery O2 Flow Rate FiO2


 


11/14/17 08:37 98.2 104 16 104/62    


 


11/14/17 08:36 98.2 106 20 104/68    


 


11/14/17 08:31     95 Nasal Cannula 2.00 


 


11/14/17 08:31   20     


 


11/14/17 08:26 97.9 110 20 114/76 95   


 


11/14/17 07:23     95 Nasal Cannula 2.00 


 


11/14/17 07:22  104      


 


11/14/17 07:20 97.9 120 24 107/74 (85) 95   


 


11/14/17 03:19  85      


 


11/14/17 03:07 98.3 78 16 89/58 (68) 99   


 


11/14/17 03:07     99 Nasal Cannula  


 


11/13/17 23:42 97.8 91 16 88/57 (67) 99   


 


11/13/17 23:42     99 Nasal Cannula  


 


11/13/17 23:30  88      


 


11/13/17 21:58     99 Nasal Cannula 2.00 


 


11/13/17 21:29     97 Nasal Cannula 2.00 


 


11/13/17 21:15     87 Nasal Cannula 2.00 


 


11/13/17 19:49 98.2 113 18 102/70 (81) 93   


 


11/13/17 19:49     93 Room Air  


 


11/13/17 19:00  105      


 


11/13/17 15:00  99      


 


11/13/17 15:00     97 Room Air  


 


11/13/17 15:00 97.9 98 20 113/77 (89) 99   


 


11/13/17 11:25     98 Nasal Cannula 3.00 


 


11/13/17 11:04  106      


 


11/13/17 11:03     100 Nasal Cannula 3.00 


 


11/13/17 11:02 98.1 106 19 125/79 (94) 99   














I/O      


 


 11/13/17 11/13/17 11/13/17 11/14/17 11/14/17 11/14/17





 07:00 15:00 23:00 07:00 15:00 23:00


 


Intake Total 1372 ml 850 ml 880 ml 420 ml 510 ml 


 


Output Total 380 ml  340 ml 750 ml  


 


Balance 992 ml 850 ml 540 ml -330 ml 510 ml 


 


      


 


Intake Oral 0 ml  300 ml 420 ml  


 


IV Total 1312 ml 850 ml 500 ml  500 ml 


 


Blood Product IV Normal Saline Flush     10 ml 


 


Tube Irrigant 60 ml  80 ml   


 


Output Urine Total 300 ml  250 ml 750 ml  


 


Gastric Drainage Total 0 ml  0 ml   


 


Chest Tube Drainage Total 80 ml  90 ml   


 


# Bowel Movements 0  0 0  








Result Diagram:  


11/14/17 0418                                                                  

              11/14/17 0418





Other Results





 Laboratory Tests








Test


  11/12/17


03:25 11/12/17


09:49 11/12/17


11:20 11/12/17


12:50


 


White Blood Count 5.1 TH/MM3    


 


Red Blood Count 2.26 MIL/MM3    


 


Hemoglobin 7.9 GM/DL    


 


Hematocrit 23.0 %    


 


Mean Corpuscular Volume 101.8 FL    


 


Mean Corpuscular Hemoglobin 35.1 PG    


 


Mean Corpuscular Hemoglobin


Concent 34.5 % 


  


  


  


 


 


Red Cell Distribution Width 21.5 %    


 


Platelet Count 55 TH/MM3    


 


Mean Platelet Volume 8.9 FL    


 


Neutrophils (%) (Auto) 61.8 %    


 


Lymphocytes (%) (Auto) 26.3 %    


 


Monocytes (%) (Auto) 10.7 %    


 


Eosinophils (%) (Auto) 0.7 %    


 


Basophils (%) (Auto) 0.5 %    


 


Neutrophils # (Auto) 3.2 TH/MM3    


 


Lymphocytes # (Auto) 1.3 TH/MM3    


 


Monocytes # (Auto) 0.5 TH/MM3    


 


Eosinophils # (Auto) 0.0 TH/MM3    


 


Basophils # (Auto) 0.0 TH/MM3    


 


CBC Comment DIFF FINAL    


 


Differential Comment     


 


Reticulocyte Count 2.1 %    


 


Absolute Reticulocyte Count 48.0 MIL/L    


 


Blood Urea Nitrogen 7 MG/DL    


 


Creatinine 0.25 MG/DL    


 


Random Glucose 77 MG/DL    


 


Total Protein 4.6 GM/DL    


 


Albumin 1.9 GM/DL    


 


Calcium Level 7.5 MG/DL    


 


Alkaline Phosphatase 134 U/L    


 


Aspartate Amino Transf


(AST/SGOT) 21 U/L 


  


  


  


 


 


Alanine Aminotransferase


(ALT/SGPT) 13 U/L 


  


  


  


 


 


Total Bilirubin 0.5 MG/DL    


 


Sodium Level 135 MEQ/L    


 


Potassium Level 3.5 MEQ/L    


 


Chloride Level 99 MEQ/L    


 


Carbon Dioxide Level 31.7 MEQ/L    


 


Anion Gap 4 MEQ/L    


 


Estimat Glomerular Filtration


Rate 284 ML/MIN 


  


  


  


 


 


Vitamin B12 Level 500 PG/ML    


 


Blood Gas Puncture Site  RT RADIAL  LT RADIAL  


 


Blood Gas Patient Temperature  98.6  98.6  


 


Blood Gas HCO3  29 mmol/L  28 mmol/L  


 


Blood Gas Base Excess  2.6 mmol/L  2.8 mmol/L  


 


Blood Gas Oxygen Saturation  79 %  96 %  


 


Arterial Blood pH  7.29  7.37  


 


Arterial Blood Partial


Pressure CO2 


  62 mmHg 


  48 mmHg 


  


 


 


Arterial Blood Partial


Pressure O2 


  53 mmHg 


  128 mmHg 


  


 


 


Arterial Blood Oxygen Content  10.0 Vol %  11.3 Vol %  


 


Arterial Blood


Carboxyhemoglobin 


  1.7 % 


  1.5 % 


  


 


 


Arterial Blood Methemoglobin  1.1 %  1.1 %  


 


Blood Gas Hemoglobin  9.0 G/DL  8.2 G/DL  


 


Oxygen Delivery Device  NRB  VENTILATOR  


 


Blood Gas Inspired Oxygen  100 %  45 %  


 


Blood Gas Ventilator Setting   PRVC//16  


 


Iron Level    21 MCG/DL 


 


Total Iron Binding Capacity    134 MCG/DL 


 


Percent Iron Saturation    15.6 % 


 


Thyroid Stimulating Hormone


3rd Gen 


  


  


  3.800 uIU/ML 


 


 


Test


  11/13/17


04:45 11/13/17


06:00 11/14/17


04:18 


 


 


Blood Urea Nitrogen 7 MG/DL   9 MG/DL  


 


Creatinine 0.29 MG/DL   0.41 MG/DL  


 


Random Glucose 94 MG/DL   131 MG/DL  


 


Total Protein 4.2 GM/DL   4.2 GM/DL  


 


Albumin 1.7 GM/DL   1.8 GM/DL  


 


Calcium Level 7.3 MG/DL   7.7 MG/DL  


 


Magnesium Level 1.8 MG/DL   2.1 MG/DL  


 


Alkaline Phosphatase 118 U/L   123 U/L  


 


Aspartate Amino Transf


(AST/SGOT) 20 U/L 


  


  19 U/L 


  


 


 


Alanine Aminotransferase


(ALT/SGPT) 15 U/L 


  


  19 U/L 


  


 


 


Total Bilirubin 0.6 MG/DL   0.4 MG/DL  


 


Sodium Level 136 MEQ/L   136 MEQ/L  


 


Potassium Level 3.1 MEQ/L   3.6 MEQ/L  


 


Chloride Level 98 MEQ/L   102 MEQ/L  


 


Carbon Dioxide Level 26.2 MEQ/L   28.1 MEQ/L  


 


Anion Gap 12 MEQ/L   6 MEQ/L  


 


Estimat Glomerular Filtration


Rate 239 ML/MIN 


  


  160 ML/MIN 


  


 


 


Protein Corrected Calcium 9.0 MG/DL    


 


Ferritin 418 NG/ML    


 


Folate 3.9 NG/ML    


 


White Blood Count  3.4 TH/MM3  3.3 TH/MM3  


 


Red Blood Count  2.15 MIL/MM3  1.99 MIL/MM3  


 


Hemoglobin  7.4 GM/DL  7.0 GM/DL  


 


Hematocrit  22.1 %  20.5 %  


 


Mean Corpuscular Volume  102.5 FL  103.4 FL  


 


Mean Corpuscular Hemoglobin  34.4 PG  35.1 PG  


 


Mean Corpuscular Hemoglobin


Concent 


  33.6 % 


  33.9 % 


  


 


 


Red Cell Distribution Width  20.9 %  21.8 %  


 


Platelet Count  49 TH/MM3  45 TH/MM3  


 


Mean Platelet Volume  8.9 FL  9.4 FL  


 


Neutrophils (%) (Auto)  82.6 %  84.6 %  


 


Lymphocytes (%) (Auto)  13.5 %  10.5 %  


 


Monocytes (%) (Auto)  3.8 %  4.9 %  


 


Eosinophils (%) (Auto)  0.0 %  0.0 %  


 


Basophils (%) (Auto)  0.1 %  0.0 %  


 


Neutrophils # (Auto)  2.8 TH/MM3  2.8 TH/MM3  


 


Lymphocytes # (Auto)  0.5 TH/MM3  0.3 TH/MM3  


 


Monocytes # (Auto)  0.1 TH/MM3  0.2 TH/MM3  


 


Eosinophils # (Auto)  0.0 TH/MM3  0.0 TH/MM3  


 


Basophils # (Auto)  0.0 TH/MM3  0.0 TH/MM3  


 


CBC Comment  AUTO DIFF  AUTO DIFF  


 


Differential Comment


  


  AUTO DIFF


CONFIRMED AUTO DIFF


CONFIRMED 


 


 


Platelet Estimate  LOW  LOW  


 


Platelet Morphology Comment  NORMAL  NORMAL  


 


Target Cells     


 


Phosphorus Level   1.9 MG/DL  


 


Lactic Acid Level   0.7 mmol/L  


 


Random Cortisol   11.9 MCG/DL  








Imaging





Last Impressions








Chest X-Ray 11/14/17 0000 Signed





Impressions: 





 Service Date/Time:  Tuesday, November 14, 2017 09:04 - CONCLUSION:  1. 





 Increasing atelectasis/consolidation of the left lung. 2. Left chest tube and 





 central venous catheter in good position. 3. The ET tube has been removed     





 Elias Oswald MD 


 


Abdomen X-Ray 11/12/17 1820 Signed





Impressions: 





 Service Date/Time:  Sunday, November 12, 2017 18:30 - CONCLUSION:  1. No 

obvious 





 obstruction or pneumoperitoneum on this limited view of the abdomen. 2. 





 Nasogastric tube with the tip projecting over the proximal gastric body. 3. 





 Volume loss in the left hemithorax with stable position of 2 thoracostomy 

tubes 





 and left basilar consolidation. .     Chris Kumari MD 


 


Chest CT 10/28/17 0000 Signed





Impressions: 





 Service Date/Time:  Saturday, October 28, 2017 14:44 - CONCLUSION:  1. 

Interval 





 placement of left-sided chest tube with slight decrease in the complex 





 low-density fluid collection in the lower left hemithorax. 2. Volume loss 





 remains in the left hemithorax with consolidation in the left infrahilar 

region 





 3. New small right pleural effusion and consolidation in the right posterior 





 lower lobe 4. The previously noted fluid and debris in the left mainstem 





 bronchus is no longer present.     Ibrahima Mccarthy MD 


 


Chest Ultrasound 10/27/17 0000 Signed





Impressions: 





 Service Date/Time:  Friday, October 27, 2017 09:59 - CONCLUSION:  No focal 





 collections of anechoic free fluid.  Prominent amount of heterogeneous 





 echotexture material surrounding the left lower chest.     Efrain Samuels MD 


 


Chest Tube Insertion 10/26/17 1224 Signed





Impressions: 





 Service Date/Time:  Thursday, October 26, 2017 13:22 - CONCLUSION: 

Uncomplicated 





 CT-guided thoracentesis.     Jorge Carrillo MD 








Objective Remarks


GENERAL: Awake alert oriented 3 --talkative and cooperative


SKIN: Warm and dry.  No obvious rashes


HEAD: Atraumatic. Normocephalic. 


EYES: Pupils equal and round. No scleral icterus. No injection or drainage.  

Extraocular muscles intact


ENT: No nasal bleeding or discharge.  Mucous membranes pink and moist.  Tongue 

is midline


NECK: Trachea midline. No JVD.  Supple


CARDIOVASCULAR: Regular rate and rhythm.  S1-S2 no S3 or S4 no heave or thrill 

or rub


RESPIRATORY: No accessory muscle use.  Breath sounds equal bilaterally.  Coarse 

breath sounds bilaterally


GASTROINTESTINAL: Abdomen soft, non-tender, nondistended. Hepatic and splenic 

margins not palpable. 


MUSCULOSKELETAL: Extremities without clubbing, cyanosis, or edema. No obvious 

deformities.  Bilateral lower extremity weakness


NEUROLOGICAL: Awake and alert. No obvious cranial nerve deficits.  Motor 

grossly within normal limits.  4 out of 5  muscle strength in the arms and 3 

out of 5 muscle strength in the legs.  Normal speech.


PSYCHIATRIC: Appropriate mood and affect; insight and judgment ABnormal.


Procedures


CT guided thoracentesis with L chest tube placement 10/26


Left thoracoscopic exploration, left thoracotomy for decortication, evacuation 

of complex loculated effusion, adhesiolysis 11/8/17





11-12 INTUBATED


11-13 EXTUBATED


11-13 BRONCHOSCOPY WITH BAL OF LEFT SIDE


Medications and IVs





Current Medications


Sodium Chloride (NS Flush) 2 ml UNSCH  PRN IVF FLUSH AFTER USING IV ACCESS Last 

administered on 11/5/17at 21:04;  Start 10/26/17 at 10:15;  Stop 11/7/17 at 17:

47;  Status DC


Methylprednisolone Sodium Succinate (SoluMEDROL INJ) 125 mg ONCE  ONCE IV PUSH  

Last administered on 10/26/17at 10:17;  Start 10/26/17 at 10:15;  Stop 10/26/17 

at 10:16;  Status DC


Albuterol/ Ipratropium (Duoneb Neb) 1 ampule ONCE  ONCE INH  Last administered 

on 10/26/17at 10:13;  Start 10/26/17 at 10:15;  Stop 10/26/17 at 10:16;  Status 

DC


Albuterol Sulfate (Albuterol Neb) 2.5 mg Q15M INH  Last administered on 10/26/

17at 10:13;  Start 10/26/17 at 10:15;  Stop 10/26/17 at 10:31;  Status DC


Sodium Chloride 1,000 ml @  125 mls/hr Q8H IV  Last administered on 10/26/17at 

10:17;  Start 10/26/17 at 10:15;  Stop 10/26/17 at 11:35;  Status DC


Ondansetron HCl (Zofran Inj) 4 mg ONCE  ONCE IV PUSH  Last administered on 10/26

/17at 10:41;  Start 10/26/17 at 10:45;  Stop 10/26/17 at 10:46;  Status DC


Ceftriaxone Sodium 1000 mg/ Sodium Chloride 100 ml @  200 mls/hr ONCE  ONCE IV  

Last administered on 10/26/17at 11:54;  Start 10/26/17 at 11:15;  Stop 10/26/17 

at 11:44;  Status DC


Azithromycin 500 mg/Sodium Chloride 250 ml @  250 mls/hr ONCE  ONCE IV  Last 

administered on 10/26/17at 12:31;  Start 10/26/17 at 11:15;  Stop 10/26/17 at 12

:14;  Status DC


Pantoprazole Sodium (Protonix Inj) 40 mg DAILY IV PUSH  Last administered on 10/

28/17at 08:54;  Start 10/26/17 at 12:00;  Stop 10/28/17 at 14:05;  Status DC


Albuterol/ Ipratropium (Duoneb Neb) 1 ampule Q4HR  NEB INH  Last administered 

on 10/30/17at 07:58;  Start 10/26/17 at 12:00;  Stop 10/30/17 at 11:59;  Status 

DC


Albuterol/ Ipratropium (Duoneb Neb) 1 ampule Q2HR NEB  PRN INH WHEEZING Last 

administered on 11/1/17at 10:14;  Start 10/26/17 at 11:30;  Stop 11/8/17 at 20:

29;  Status DC


Miscellaneous Information 1 [id number] [id number]  Last administered on 10/26/17at 21:22;  

Start 10/26/17 at 11:30;  Stop 11/4/17 at 23:35;  Status DC


Chlorhexidine Gluconate (Chlorhexidine 2% Cloth) Taper DAILY@04 TOP  Last 

administered on 10/29/17at 04:00;  Start 10/27/17 at 04:00;  Stop 11/4/17 at 23:

35;  Status DC


Chlorhexidine Gluconate (Chlorhexidine 2% Cloth) 3 pack UNSCH  PRN TOP HYGIENIC 

CARE;  Start 10/26/17 at 11:30;  Stop 11/4/17 at 23:35;  Status DC


Senna/Docusate Sodium (Jess-Colace) 1 tab BID PO  Last administered on 11/8/ 17at 08:44;  Start 10/26/17 at 21:00;  Stop 11/8/17 at 20:29;  Status DC


Magnesium Hydroxide (Milk Of Magnesia Liq) 30 ml Q12H  PRN PO Mild constipation 

Last administered on 11/6/17at 20:33;  Start 10/26/17 at 11:30;  Stop 11/8/17 

at 20:29;  Status DC


Sennosides (Senokot) 17.2 mg Q12H  PRN PO Moderate constipation Last 

administered on 11/14/17at 02:19;  Start 10/26/17 at 11:30


Bisacodyl (Dulcolax Supp) 10 mg DAILY  PRN RECTAL SEVERE CONSITIPATION;  Start 

10/26/17 at 11:30


Lactulose (Lactulose Liq) 30 ml DAILY  PRN PO SEVERE CONSITIPATION;  Start 10/26

/17 at 11:30;  Status Cancel


Sodium Chloride 1,000 ml @  84 mls/hr [id number] IV  Last administered on 10/26/

17at 17:26;  Start 10/26/17 at 11:30;  Stop 10/27/17 at 08:37;  Status DC


Potassium Chloride 100 ml @  50 mls/hr Q2H  PRN IV For Potassium 2.8 - 3.2 mEq/L

;  Start 10/26/17 at 11:30;  Stop 10/28/17 at 14:05;  Status DC


Potassium Chloride 100 ml @  50 mls/hr Q2H  PRN IV For Potassium 2.8 - 3.2 mEq/L

;  Start 10/26/17 at 11:30;  Stop 10/28/17 at 14:05;  Status DC


Potassium Bicarb/ Potassium Chloride (K-Lyte Cl  Eff) 50 meq UNSCH  PRN PO For 

Potassium 3.3 - 3.5 mEq/L;  Start 10/26/17 at 11:30;  Stop 10/28/17 at 14:05;  

Status DC


Potassium Chloride 100 ml @  25 mls/hr UNSCH  PRN IV For Potassium 3.3 - 3.5 mEq

/L;  Start 10/26/17 at 11:30;  Stop 10/28/17 at 14:05;  Status DC


Potassium Chloride 100 ml @  50 mls/hr Q2H  PRN IV For Potassium 3.3 - 3.5 mEq/L

;  Start 10/26/17 at 11:30;  Stop 10/28/17 at 14:05;  Status DC


Magnesium Sulfate 4 gm/Sodium Chloride 100 ml @  50 mls/hr UNSCH  PRN IV For 

Magnesium 0.9 - 1.1 mg/dL;  Start 10/26/17 at 11:30;  Stop 10/28/17 at 14:05;  

Status DC


Magnesium Oxide (Mag-Ox) 800 mg UNSCH  PRN PO For Magnesium 1.2 - 1.6 mg/dL;  

Start 10/26/17 at 11:30;  Stop 10/28/17 at 14:05;  Status DC


Magnesium Sulfate 2 gm/Sodium Chloride 100 ml @  50 mls/hr UNSCH  PRN IV For 

Magnesium 1.2 - 1.6 mg/dL;  Start 10/26/17 at 11:30;  Stop 10/28/17 at 14:05;  

Status DC


Potassium Phosphate (K-Phos) 2,000 mg Q4H  PRN PO For Phosphorus < 2.5 mg/dL;  

Start 10/26/17 at 11:30;  Stop 10/28/17 at 14:05;  Status DC


Sodium Phosphate 30 mmol/Sodium Chloride 250 ml @  42 mls/hr UNSCH  PRN IV For 

Phosphorus < 2.5 mg/dL;  Start 10/26/17 at 11:30;  Stop 10/28/17 at 14:05;  

Status DC


Potassium Phosphate (K-Phos) 2,000 mg UNSCH  PRN PO/TUBE SEE LABEL COMMENTS;  

Start 10/26/17 at 11:30;  Stop 10/28/17 at 14:05;  Status DC


Potassium Phosphate 30 mmol/ Sodium Chloride 260 ml @  42 mls/hr UNSCH  PRN IV 

SEE LABEL COMMENTS Last administered on 10/27/17at 11:46;  Start 10/26/17 at 11:

30;  Stop 10/28/17 at 14:05;  Status DC


Dextrose (D50w (Vial) Inj) 50 ml UNSCH  PRN IV PUSH HYPOGLYCEMIA-SEE COMMENTS;  

Start 10/26/17 at 11:30;  Stop 11/4/17 at 23:34;  Status DC


Glucagon (Glucagon Inj) 1 mg UNSCH  PRN OTHER HYPOGLYCEMIA-SEE COMMENTS;  Start 

10/26/17 at 11:30;  Stop 11/4/17 at 23:34;  Status DC


Insulin Human Regular (NovoLIN R SUPPLEMENTAL SCALE) 1 Q6H SQ  Last 

administered on 10/29/17at 17:30;  Start 10/26/17 at 11:30;  Stop 11/4/17 at 23:

34;  Status DC


Ceftriaxone Sodium 1000 mg/ Sodium Chloride 100 ml @  200 mls/hr Q24H IV  Last 

administered on 10/31/17at 12:20;  Start 10/27/17 at 12:00;  Stop 11/1/17 at 11:

59;  Status DC


Azithromycin 500 mg/Sodium Chloride 250 ml @  250 mls/hr Q24H IV  Last 

administered on 10/28/17at 11:48;  Start 10/27/17 at 13:00;  Stop 10/28/17 at 15

:20;  Status DC


Methylprednisolone Sodium Succinate (SoluMEDROL INJ) 60 mg Q6HR IV PUSH  Last 

administered on 10/27/17at 12:31;  Start 10/26/17 at 16:00;  Stop 10/27/17 at 13

:03;  Status DC


Lidocaine/ Epinephrine (Xylocaine-Epi 1%-1:100,000 Inj) 20 ml STK-MED ONCE 

.ROUTE  Last administered on 10/26/17at 12:38;  Start 10/26/17 at 12:38;  Stop 

10/26/17 at 12:39;  Status DC


Fentanyl Citrate (fentaNYL INJ) 100 mcg STK-MED ONCE .ROUTE  Last administered 

on 10/26/17at 12:47;  Start 10/26/17 at 12:47;  Stop 10/26/17 at 12:48;  Status 

DC


Midazolam HCl (Versed Inj) 2 mg STK-MED ONCE .ROUTE  Last administered on 10/26/

17at 12:47;  Start 10/26/17 at 12:47;  Stop 10/26/17 at 12:48;  Status DC


Iohexol (Omnipaque 350 Inj) 69 ml STK-MED ONCE IVCONTRAST  Last administered on 

10/26/17at 13:33;  Start 10/26/17 at 13:33;  Stop 10/26/17 at 13:34;  Status DC


Ephedrine Sulfate (ePHEDrine/NS 25 MG/5 ML SYR) 25 mg STK-MED ONCE .ROUTE ;  

Start 10/26/17 at 13:40;  Stop 10/26/17 at 13:41;  Status DC


Acetaminophen (Tylenol) 650 mg Q6H  PRN PO PAIN Last administered on 10/27/17at 

04:13;  Start 10/27/17 at 04:15;  Stop 10/30/17 at 11:16;  Status DC


Oxycodone/ Acetaminophen (Percocet  5-325 Mg) 1 tab ONCE  ONCE PO  Last 

administered on 10/27/17at 09:39;  Start 10/27/17 at 09:30;  Stop 10/27/17 at 09

:31;  Status DC


Methylprednisolone Sodium Succinate (SoluMEDROL INJ) 40 mg Q6HR IV PUSH  Last 

administered on 10/29/17at 05:12;  Start 10/27/17 at 18:00;  Stop 10/29/17 at 11

:40;  Status DC


Morphine Sulfate (Morphine Inj) 1 mg ONCE  ONCE IV PUSH  Last administered on 10

/27/17at 15:06;  Start 10/27/17 at 15:00;  Stop 10/27/17 at 15:01;  Status DC


Acetaminophen (Tylenol) 650 mg Q6H  PRN PO PAIN 1-2;  Start 10/27/17 at 22:30


Morphine Sulfate (Morphine Inj) 1 mg Q4H  PRN IV PAIN 5-10 Last administered on 

10/28/17at 11:12;  Start 10/27/17 at 22:30;  Stop 10/28/17 at 13:46;  Status DC


Morphine Sulfate (Morphine Inj) 2 mg Q4H  PRN IV BREAKTHROUGH PAIN 6-10 Last 

administered on 11/8/17at 13:33;  Start 10/28/17 at 14:30;  Stop 11/8/17 at 19:

27;  Status DC


Acetaminophen/ Hydrocodone Bitart (Norco  5-325 Mg) 1 tab Q6H  PRN PO PAIN 3-5 

Last administered on 10/30/17at 05:18;  Start 10/28/17 at 13:45;  Stop 11/6/17 

at 09:01;  Status DC


Acetaminophen/ Hydrocodone Bitart (Norco  5-325 Mg) 2 tab Q6H  PRN PO PAIN 6-10 

Last administered on 11/6/17at 08:39;  Start 10/28/17 at 13:45;  Stop 11/6/17 

at 09:01;  Status DC


Pantoprazole Sodium (Protonix) 40 mg DAILY PO  Last administered on 11/8/17at 08

:41;  Start 10/29/17 at 09:00;  Stop 11/8/17 at 20:29;  Status DC


Iohexol (Omnipaque 350 Inj) 70 ml STK-MED ONCE IVCONTRAST  Last administered on 

10/28/17at 14:59;  Start 10/28/17 at 14:59;  Stop 10/28/17 at 15:00;  Status DC


Azithromycin 500 mg/Sodium Chloride 250 ml @  250 mls/hr Q24H IV  Last 

administered on 11/1/17at 11:57;  Start 10/30/17 at 13:00;  Stop 11/1/17 at 14:

46;  Status DC


Methylprednisolone Sodium Succinate (SoluMEDROL INJ) 40 mg Q12HR IV PUSH  Last 

administered on 10/31/17at 07:37;  Start 10/29/17 at 21:00;  Stop 10/31/17 at 13

:51;  Status DC


Methylprednisolone Sodium Succinate (SoluMEDROL INJ) 40 mg DAILY IV PUSH  Last 

administered on 11/2/17at 08:15;  Start 11/1/17 at 09:00;  Stop 11/2/17 at 18:19

;  Status DC


Nifedipine (Procardia Xl) 60 mg ONCE  ONCE PO  Last administered on 11/2/17at 17

:49;  Start 11/2/17 at 17:30;  Stop 11/2/17 at 17:32;  Status DC


Nifedipine (Procardia Xl) 30 mg DAILY PO  Last administered on 11/14/17at 08:32

;  Start 11/3/17 at 09:00


Furosemide (Lasix) 20 mg DAILY PO  Last administered on 11/12/17at 09:21;  

Start 11/2/17 at 17:35;  Status Future Hold


Prednisone (Deltasone) 10 mg DAILY PO  Last administered on 11/12/17at 09:21;  

Start 11/5/17 at 09:00;  Status Future Hold


Acetaminophen/ Hydrocodone Bitart (Norco  5-325 Mg) 1 tab Q4H  PRN PO PAIN 3-5;

  Start 11/6/17 at 09:00;  Stop 11/8/17 at 19:27;  Status DC


Acetaminophen/ Hydrocodone Bitart (Norco  5-325 Mg) 2 tab Q4H  PRN PO PAIN 6-10 

Last administered on 11/8/17at 08:47;  Start 11/6/17 at 09:15;  Stop 11/8/17 at 

19:27;  Status DC


Lactulose (Lactulose Liq) 30 ml ONCE  ONCE PO ;  Start 11/7/17 at 13:00;  Stop 

11/7/17 at 13:19;  Status DC


Lactulose (Lactulose Liq) 30 ml QID  PRN PO severe constipation  Last 

administered on 11/8/17at 08:42;  Start 11/7/17 at 13:00


Simethicone (Phazyme Chew) 125 mg ONCE  ONCE PO  Last administered on 11/7/17at 

14:59;  Start 11/7/17 at 13:30;  Stop 11/7/17 at 13:31;  Status DC


Sodium Chloride (NS Flush) 2 ml BID IV FLUSH  Last administered on 11/8/17at 09:

00;  Start 11/7/17 at 21:00;  Stop 11/8/17 at 20:29;  Status DC


Sodium Chloride (NS Flush) 2 ml UNSCH  PRN IV FLUSH FLUSH AFTER USING IV ACCESS

;  Start 11/7/17 at 16:15;  Stop 11/8/17 at 20:29;  Status DC


Cefazolin Sodium 500 mg/Sodium Chloride 505 ml @ 0 mls/hr ON  CALL IRRIGATION ;

  Start 11/7/17 at 16:15;  Stop 11/14/17 at 16:14


Cefazolin Sodium/ Dextrose 50 ml @  150 mls/hr ON  CALL IV  Last administered 

on 11/8/17at 16:17;  Start 11/7/17 at 16:15;  Stop 11/14/17 at 16:14


Chlorhexidine Gluconate (Hibiclens 4% Top Soln) 1 applic ON  CALL TOPICAL  Last 

administered on 11/8/17at 08:42;  Start 11/7/17 at 16:15;  Stop 11/9/17 at 08:40

;  Status DC


Dextrose (D50w (Vial) Inj) 50 ml UNSCH  PRN IV PUSH HYPOGLYCEMIA-SEE COMMENTS;  

Start 11/7/17 at 16:15


Lactated Ringer's 1,000 ml @  30 mls/hr Q24H PRN IV SEE LABEL COMMENTS Last 

administered on 11/8/17at 14:53;  Start 11/7/17 at 18:30;  Stop 11/8/17 at 20:23

;  Status DC


Sodium Chloride 500 ml @  30 mls/hr [id number] PRN IV SEE LABEL COMMENTS;  Start 11 /7/17 at 18:30;  Stop 11/8/17 at 20:23;  Status DC


Metoprolol Tartrate (Lopressor) 25 mg ON CALL  PRN PO SEE LABEL COMMENTS;  

Start 11/7/17 at 18:30;  Stop 11/8/17 at 20:23;  Status DC


Povidone Iodine (Betadine 5% Antisepsis Kit) 1 applic ON CALL  PRN EACH NARE 

SEE LABEL COMMENTS;  Start 11/7/17 at 18:30;  Stop 11/8/17 at 20:23;  Status DC


Chlorhexidine Gluconate (Chlorhexidine 2% Cloth) 3 pack ON CALL  PRN TOPICAL 

SEE LABEL COMMENTS;  Start 11/7/17 at 18:30;  Stop 11/9/17 at 08:40;  Status DC


Insulin Human Regular (NovoLIN R INJ) See Protocol Table ... ON CALL  PRN SQ 

SEE PROTOCOL TABLE;  Start 11/7/17 at 18:30;  Stop 11/8/17 at 20:23;  Status DC


Albuterol/ Ipratropium (Duoneb Neb) 1 ampule Q6HR  NEB NEB  Last administered 

on 11/8/17at 09:40;  Start 11/7/17 at 22:00;  Stop 11/8/17 at 20:29;  Status DC


Bupivacaine HCl (Marcaine Pf 0.5% Inj) 30 ml ONCE  ONCE INFIL  Last 

administered on 11/8/17at 17:51;  Start 11/8/17 at 17:51;  Stop 11/8/17 at 17:53

;  Status DC


Albuterol Sulfate (Albuterol Neb) 2.5 mg Q6HR  NEB NEB  Last administered on 11/

10/17at 07:08;  Start 11/8/17 at 22:00;  Stop 11/10/17 at 12:20;  Status DC


Albuterol Sulfate (Albuterol Neb) 2.5 mg Q2HR NEB  PRN NEB WHEEZING Last 

administered on 11/12/17at 10:55;  Start 11/8/17 at 19:30


IV Flush (NS Flush) 2 ml BID IV FLUSH  Last administered on 11/14/17at 08:32;  

Start 11/8/17 at 21:00


IV Flush (NS Flush) 2 ml UNSCH  PRN IV FLUSH FLUSH AFTER USING IV ACCESS;  

Start 11/8/17 at 19:30


Miscellaneous Information (Post-op Orders (for Pharmacy))  STAT  ONCE OTHER ;  

Start 11/8/17 at 19:30;  Stop 11/8/17 at 20:27;  Status DC


Pantoprazole Sodium (Protonix) 40 mg HS PO  Last administered on 11/13/17at 20:

34;  Start 11/8/17 at 21:00


Ondansetron HCl (Zofran Inj) 4 mg Q6H  PRN IV PUSH NAUSEA OR VOMITING Last 

administered on 11/12/17at 05:58;  Start 11/8/17 at 19:30


Docusate Calcium (Surfak) 240 mg HS PO  Last administered on 11/8/17at 23:35;  

Start 11/8/17 at 21:00;  Stop 11/9/17 at 08:40;  Status DC


Magnesium Hydroxide (Milk Of Magnesia Liq) 30 ml DAILY  PRN PO MILD CONSTIPATION

;  Start 11/8/17 at 19:30


Acetaminophen 100 ml @  400 mls/hr Q6H IV  Last administered on 11/9/17at 19:24

;  Start 11/8/17 at 21:00;  Stop 11/9/17 at 15:14;  Status DC


Naloxone HCl (Narcan Inj) 0.4 mg UNSCH  PRN IV PUSH RESPIRATORY RATE LESS THAN 

10;  Start 11/8/17 at 19:30;  Stop 11/13/17 at 18:00;  Status DC


Morphine Sulfate (Morphine 1 Mg/ ml PCA) 30 mg UNSCH IV  Last administered on 11 /12/17at 09:29;  Start 11/8/17 at 19:30;  Stop 11/13/17 at 18:00;  Status DC


PCA Dosage Infused (Pha) 1 Q8HR .XX  Last administered on 11/12/17at 06:00;  

Start 11/8/17 at 22:00;  Stop 11/13/17 at 18:00;  Status DC


Morphine Sulfate (*morphine INJ PERIprocedure ONLY) 8 mg STK-MED ONCE .ROUTE  

Last administered on 11/8/17at 20:10;  Start 11/8/17 at 20:08;  Stop 11/9/17 at 

08:40;  Status DC


Morphine Sulfate (*morphine INJ PERIprocedure ONLY) 8 mg STK-MED ONCE .ROUTE  

Last administered on 11/8/17at 20:30;  Start 11/8/17 at 20:22;  Stop 11/9/17 at 

08:40;  Status DC


Meperidine HCl (*DEMEROL INJ PERIprocedural ONLY) 25 mg STK-MED ONCE .ROUTE  

Last administered on 11/8/17at 20:23;  Start 11/8/17 at 20:22;  Stop 11/9/17 at 

08:40;  Status DC


Morphine Sulfate (Morphine 1 Mg/ ml PCA) 30 mg STK-MED ONCE .ROUTE ;  Start 11/8 /17 at 20:25;  Stop 11/8/17 at 20:26;  Status DC


Sodium Chloride 1,000 ml @  30 mls/hr Q24H IV  Last administered on 11/11/17at 

21:00;  Start 11/8/17 at 21:00


Miscellaneous Information ALL NURSING DEPARTME... UNSCH  PRN .XX SEE LABEL 

COMMENTS;  Start 11/8/17 at 19:55;  Stop 11/9/17 at 19:54;  Status DC


Polyethylene Glycol (Miralax) 17 gm DAILY PO  Last administered on 11/14/17at 08

:32;  Start 11/9/17 at 09:00


Potassium Chloride (KCl) 30 meq ONCE  ONCE PO  Last administered on 11/9/17at 10

:07;  Start 11/9/17 at 08:45;  Stop 11/9/17 at 08:46;  Status DC


Potassium Chloride (KCl) 10 meq DAILY PO  Last administered on 11/14/17at 08:32

;  Start 11/9/17 at 09:00


Cefazolin Sodium 1000 mg/Sodium Chloride 100 ml @  200 mls/hr Q8H IV ;  Start 11 /9/17 at 15:45;  Stop 11/9/17 at 15:45;  Status DC


Cefazolin Sodium 1000 mg/Sodium Chloride 100 ml @  200 mls/hr Q8H IV  Last 

administered on 11/10/17at 08:04;  Start 11/9/17 at 16:00;  Stop 11/10/17 at 08:

19;  Status DC


Ceftriaxone Sodium 1000 mg/ Sodium Chloride 100 ml @  200 mls/hr Q24H IV  Last 

administered on 11/13/17at 23:27;  Start 11/9/17 at 23:00


Furosemide (Lasix Inj) 20 mg ONCE  ONCE IV PUSH  Last administered on 11/10/

17at 13:08;  Start 11/10/17 at 14:00;  Stop 11/10/17 at 14:01;  Status DC


Potassium Chloride (KCl) 20 meq ONCE  ONCE PO  Last administered on 11/10/17at 

13:07;  Start 11/10/17 at 14:00;  Stop 11/10/17 at 14:01;  Status DC


Albuterol Sulfate (Albuterol Neb) 2.5 mg Q6HR WHILE AWAKE  NEB INH  Last 

administered on 11/12/17at 09:42;  Start 11/10/17 at 14:00;  Stop 11/12/17 at 10

:45;  Status DC


Furosemide (Lasix Inj) 20 mg ONCE  ONCE IV PUSH  Last administered on 11/10/

17at 18:49;  Start 11/10/17 at 17:00;  Stop 11/10/17 at 17:01;  Status DC


Potassium Chloride 100 ml @  50 mls/hr Q2H IV  Last administered on 11/11/17at 

11:26;  Start 11/11/17 at 08:00;  Stop 11/11/17 at 11:59;  Status DC


Etomidate (Amidate Inj) 40 mg STK-MED ONCE .ROUTE  Last administered on 11/12/ 17at 10:58;  Start 11/12/17 at 10:08;  Stop 11/12/17 at 10:09;  Status DC


Midazolam HCl (Versed Inj) 5 mg STK-MED ONCE .ROUTE  Last administered on 11/12/ 17at 10:57;  Start 11/12/17 at 10:09;  Stop 11/12/17 at 10:10;  Status DC


Albuterol Sulfate (Albuterol Neb) 2.5 mg Q4HR NEB  PRN NEB DYSPNEA;  Start 11/12 /17 at 10:30


Propofol 50 ml @ As Directed STK-MED ONCE .ROUTE  Last administered on 11/12/ 17at 10:59;  Start 11/12/17 at 10:18;  Stop 11/12/17 at 10:19;  Status DC


Chlorhexidine Gluconate (Peridex 0.12% Liq) 15 ml BID@08,20 MT  Last 

administered on 11/12/17at 21:32;  Start 11/12/17 at 20:00


Propofol 100 ml @  2.034 mls/ hr TITRATE  PRN IV SEDATION Last administered on 

11/13/17at 05:49;  Start 11/12/17 at 10:30;  Stop 11/13/17 at 18:00;  Status DC


Fentanyl Citrate 250 ml @ 5 mls/hr TITRATE  PRN IV SEDATION Last administered 

on 11/12/17at 18:25;  Start 11/12/17 at 10:30;  Stop 11/13/17 at 18:00;  Status 

DC


Albuterol/ Ipratropium (Duoneb Neb) 1 ampule Q4HR  NEB NEB  Last administered 

on 11/14/17at 08:28;  Start 11/12/17 at 12:00


Methylprednisolone Sodium Succinate (SoluMEDROL INJ) 60 mg Q12H IV PUSH ;  

Start 11/12/17 at 12:00;  Stop 11/12/17 at 12:00;  Status DC


Methylprednisolone Sodium Succinate (SoluMEDROL INJ) 60 mg Q8H IV PUSH  Last 

administered on 11/14/17at 04:29;  Start 11/12/17 at 12:00


Enoxaparin Sodium (Lovenox Inj) 40 mg Q24H SQ  Last administered on 11/12/17at 

12:54;  Start 11/12/17 at 12:00;  Status Future Hold


Ferrous Sulfate (Ferrous Sulfate Liq) 300 mg BID PO  Last administered on 11/13/ 17at 09:05;  Start 11/12/17 at 14:30;  Stop 11/13/17 at 17:41;  Status DC


Acetylcysteine (Mucomyst 20% Neb) 2 ml Q6HR  NEB NEB ;  Start 11/12/17 at 16:00

;  Stop 11/12/17 at 17:41;  Status DC


Non-Formulary Medication 2 ML NEB EVERY 6 HOURS Q6HR  NEB NEB  Last 

administered on 11/14/17at 04:25;  Start 11/12/17 at 18:00;  Stop 11/15/17 at 15

:59


Potassium Bicarb/ Potassium Chloride (K-Lyte Cl  Eff) 25 meq ONCE  ONCE PO  

Last administered on 11/13/17at 07:26;  Start 11/13/17 at 06:45;  Stop 11/13/17 

at 06:51;  Status DC


Lactulose (Lactulose Liq) 30 ml DAILY NG  Last administered on 11/14/17at 08:32

;  Start 11/13/17 at 09:00


Docusate Sodium (Colace) 100 mg BID PO  Last administered on 11/14/17at 08:32;  

Start 11/13/17 at 09:00


Magnesium Sulfate/ Dextrose 100 ml @  100 mls/hr ONCE  ONCE IV  Last 

administered on 11/13/17at 08:57;  Start 11/13/17 at 08:45;  Stop 11/13/17 at 09

:44;  Status DC


Sodium Chloride 250 ml @ 0 mls/hr BOLUS  ONCE IV  Last administered on 11/13/ 17at 10:30;  Start 11/13/17 at 10:30;  Stop 11/13/17 at 10:31;  Status DC


Sodium Chloride 500 ml @  500 mls/hr BOLUS  ONCE IV  Last administered on 11/13/ 17at 13:11;  Start 11/13/17 at 13:15;  Stop 11/13/17 at 14:14;  Status DC


Sodium Chloride 500 ml @  500 mls/hr Q1H ONCE IV  Last administered on 11/13/ 17at 17:36;  Start 11/13/17 at 17:30;  Stop 11/13/17 at 18:29;  Status DC


Ferrous Sulfate (Ferrous Sulfate) 325 mg BID PO  Last administered on 11/14/ 17at 08:32;  Start 11/13/17 at 21:00


Oxycodone/ Acetaminophen (Percocet 7.5-325 Mg) 1 tab Q6H  PRN PO pain 5-10 Last 

administered on 11/14/17at 07:29;  Start 11/13/17 at 18:15


Albumin Human 500 ml @  250 mls/hr ONCE  ONCE IV  Last administered on 11/14/ 17at 04:26;  Start 11/14/17 at 03:45;  Stop 11/14/17 at 05:44;  Status DC


Furosemide (Lasix Inj) 40 mg STK-MED ONCE .ROUTE  Last administered on 11/14/ 17at 09:04;  Start 11/14/17 at 09:04;  Stop 11/14/17 at 09:05;  Status DC





A/P


Problem List:  


(1) Pleural effusion on left


ICD Code:  J90 - Pleural effusion, not elsewhere classified


Status:  Acute


(2) Respiratory distress


ICD Code:  R06.00 - Dyspnea, unspecified


Status:  Resolved


(3) Hyponatremia


ICD Code:  E87.1 - Hypo-osmolality and hyponatremia


Status:  Acute


(4) COPD (chronic obstructive pulmonary disease)


ICD Code:  J44.9 - Chronic obstructive pulmonary disease, unspecified


Status:  Chronic


(5) Tobacco abuse


ICD Code:  Z72.0 - Tobacco use


Status:  Chronic


Assessment and Plan


ASSESSMENT 


Acute hypoxemic respiratory failure


Complete left lung atelectasis


Left-sided consolidation with pleural effusion


Left thoracotomy with decortication, evacuation of complex loculated effusion, 

adhesiolysis


Pulmonary hypertension


COPD exacerbation


Hyponatremia/hypophosphatemia


Hypokalemia.


Urinary tract infection on abx








PLAN:


Neuro:


Hold propofol and reduce fentanyl to facilitate weaning trials


Pain control with fentanyl--off all sedation at this time





Pulm: 


Acute hypoxemic respiratory failure


Complete L lung atelectasis


S/p decortication for L hemothorax


COPD


Tobacco abuse


?L lung mass vs loculated effusion/


Emergently intubated and placed on mechanical ventilation 11/12


s/p Bronchoscopy by Dr. Grey.  Left lung fields reexpanded, persistent 

volume loss involving left lower lung field


Start weaning trials with possible extubation--extubated November 13


Status post left thoracotomy and decortication 11/8/17-postop diagnosis was 

hemothorax


Bronchodilators, Solumedrol 60 mg IV q8 hours 


s/p CT guided left-sided thoracentesis and CT placement by IR 10/26/17


CT chest: Either highly complex fluid or soft tissue mass involving the left 

lung base with mass effect. This measures 12.2 x 12.6 x 7.4 cm. 


4.4 cm ascending aortic aneurysm.


--Status post bronchoscopy with left sided bronchial alveolar lavage by 

pulmonary on November 13





CV:  


Pulmonary hypertension, most likely secondary from chronic hypoxia by echo


Moderate aortic stenosis mild to moderate aortic regurgitation


Monitor HR and BP and maintain MAP>65mmHg


Echo 10/27 - systolic function grossly normal. Moderate AS/AI.  Moderate to 

severe pulmonary hypertension.. 





GI: 


Nothing by mouth-has been extubated


Diet as tolerated





: 


Hyponatremia ?chronic but chronicity not truly known


Hypokalemia, resolved





ID: 


UTI


Possible pneumonia


Continue Rocephin for now


UTI present on admission with culture positive for pansensitive Klebsiella.


Influenza screen, blood and pleural fluid cultures negative to date. 


Temporal fluid studies from November 8, on cultures negative to date





Endo: 


SSI with Accu-Cheks for glycemic control.





Heme: 


Macrocytic anemia 


Anemia of chronic disease and iron deficiency


Monitor CBC.  Normal B12, 


Supplement iron due to her lab findings consistent with iron deficiency


Being transfused 1 unit packed red blood cells today





DVT prophylaxis with  SCDs. Lovenox 40 mg sq daily. IV Famotidine 20 mg IV q12h


Being transfused





Needs physical therapy and occupational therapy aggressively


We'll move out of ICU later today


Discharge Planning


Needs to be more mobile


May require SNF





Problem Qualifiers





(1) COPD (chronic obstructive pulmonary disease):  


Qualified Codes:  J43.1 - Panlobular emphysema








Roland Foote DO Nov 14, 2017 10:41

## 2017-11-14 NOTE — MR
cc:

NATA GREY M.D.

****

 

 

DATE:  11/12/2017

 

PROCEDURE PERFORMED:

Fiberoptic bronchoscopy with bronchial lavage and therapeutic aspiration for

complete atelectasis left lung.

 

POSTOPERATIVE DIAGNOSIS:

Fiberoptic bronchoscopy with bronchial lavage and therapeutic aspiration for

complete atelectasis left lung.

 

PROCEDURE PERFORMED:

Fiberoptic bronchoscopy.

 

ANESTHESIA

IV propofol and intubation.

 

SURGEON:

Wayne Grey MD.

 

DESCRIPTION OF THE PROCEDURE AND FINDINGS:

The patient was already intubated with a size #8 endotracheal tube and the

Olympus IT-180 bronchoscope was used to visualize the bronchi. The scope was

advanced via the endotracheal tube into the trachea.  The trachea and lakeisha

appeared normal. The scope was then advanced into the left mainstem and left

upper lobe segmental bronchi.  There were thick mucoid secretions noted here

occluding the bronchial lumen which were suctioned out and saline washings were

done.  The left upper lobe bronchi demonstrated mild endobronchitis but no

endobronchial lesions were seen.  Saline washings and lavage were carried out

until clear.  The scope was also advanced into the left lower lobe segmental

bronchi which had thick mucoid secretions with mucous plugs; these were

suctioned out.  Saline washings and lavage were done until clear.  There was

mild endobronchitis with mucosal edema but no endobronchial mass was seen.

Washings were done and the mucous plugs were dislodged and aspirated and saline

lavage was carried out until clear.  The scope was also advanced into the right

mainstem and right upper lobe segmental bronchi, which demonstrated a few

mucoid secretions and these were suctioned out.  The right middle and right

lower lobe segmental bronchi were visualized.  These bronchi demonstrated

mucoid secretions with mild endobronchitis and there were a few mucous plugs

and these were suctioned out and saline washings and lavage were done.

 

The procedure was then terminated.  The patient tolerated the procedure well.

 

 

 

                              _________________________________

                              MD ANA Callejas/YECENIA

D:  11/12/2017/3:36 PM

T:  11/14/2017/8:13 AM

Visit #:  [id number]

Job #:  [id number]

## 2017-11-14 NOTE — HHI.PR
Subjective


Remarks


S/P thoracotomy  and  decortication . Has  a chest tube in place. Seems  better

  today  and was weaned  off the vent .


C XR  showed improved  aeration on left. Chest tube is  draining.


No fever.Hgb is 7.3





Objective





Vital Signs








  Date Time  Temp Pulse Resp B/P (MAP) Pulse Ox O2 Delivery O2 Flow Rate FiO2


 


11/14/17 18:05  101      


 


11/14/17 17:36   17     


 


11/14/17 17:00  94      


 


11/14/17 15:00  115      


 


11/14/17 15:00     99 Nasal Cannula 2.00 


 


11/14/17 15:00 98.1 109 17 104/65 (78) 99   


 


11/14/17 11:17  120      


 


11/14/17 11:17 97.9 120 20 95/68 (77) 99   


 


11/14/17 11:16     99 Nasal Cannula 2.00 


 


11/14/17 08:37 98.2 104 16 104/62    


 


11/14/17 08:36 98.2 106 20 104/68    


 


11/14/17 08:31     95 Nasal Cannula 2.00 


 


11/14/17 08:26 97.9 110 20 114/76 95   


 


11/14/17 07:23     95 Nasal Cannula 2.00 


 


11/14/17 07:22  104      


 


11/14/17 07:20 97.9 120 24 107/74 (85) 95   


 


11/14/17 03:19  85      


 


11/14/17 03:07 98.3 78 16 89/58 (68) 99   


 


11/14/17 03:07     99 Nasal Cannula  


 


11/13/17 23:42 97.8 91 16 88/57 (67) 99   


 


11/13/17 23:42     99 Nasal Cannula  


 


11/13/17 23:30  88      


 


11/13/17 21:58     99 Nasal Cannula 2.00 


 


11/13/17 21:29     97 Nasal Cannula 2.00 


 


11/13/17 21:15     87 Nasal Cannula 2.00 


 


11/13/17 19:49 98.2 113 18 102/70 (81) 93   


 


11/13/17 19:49     93 Room Air  


 


11/13/17 19:00  105      














I/O      


 


 11/13/17 11/13/17 11/13/17 11/14/17 11/14/17 11/14/17





 07:00 15:00 23:00 07:00 15:00 23:00


 


Intake Total 1372 ml 850 ml 880 ml 420 ml 1545 ml 480 ml


 


Output Total 380 ml  340 ml 750 ml 1560 ml 80 ml


 


Balance 992 ml 850 ml 540 ml -330 ml -15 ml 400 ml


 


      


 


Intake Oral 0 ml  300 ml 420 ml 625 ml 480 ml


 


IV Total 1312 ml 850 ml 500 ml  500 ml 


 


Packed Cells     400 ml 


 


Blood Product IV Normal Saline Flush     20 ml 


 


Tube Irrigant 60 ml  80 ml   


 


Output Urine Total 300 ml  250 ml 750 ml 1500 ml 


 


Gastric Drainage Total 0 ml  0 ml   


 


Chest Tube Drainage Total 80 ml  90 ml  60 ml 80 ml


 


# Voids      1


 


# Bowel Movements 0  0 0 0 








Result Diagram:  


11/14/17 0418 11/14/17 0418





Objective Remarks


GENERAL:  This is an averagely built middle-aged white female who is alert


HEENT:  Head normocephalic.  Pupils are reactive and equal.  Tongue moist.


Throat is clear.  Nasal mucosa clear.


NECK:  Supple.  No bruits, thyroid enlargement or lymphadenopathy.


CHEST:  Distant breath sounds at the left  lung base and occ wheeze and  

crackles left  chest .


HEART:  The heart sounds are irregular, S1-S2.  No murmur.  No S3.


ABDOMEN:  Soft and nontender.  No organomegaly.  Bowel sounds are active.


EXTREMITIES:  Edema 1+ with diminished peripheral pulses. Bilateral Foot  

deformity.  NEUROLOGIC:


Reflexes are 1+ . No deficits


SKIN: Dry and scaly.





Assessment and Plan


Assessment and Plan





 


IMPRESSION


1.  COPD with acute exacerbation, resolved


2.  S/p decortication  left  Chest


3.  Atelectasis left lower lobe with mucus plugging.


4.  Hyponatremia.


5.  Abnormal liver enzymes


6.  Anemia of chronic disease.











Plan :








1. O2  2 L.





2. IS at bedside q3h





3. Cont  antibiotics.





4. Duoneb   nebs tid.





5. Transfuse 1 U Packed  red  cells.





6. Mucomyst 20 % nebs q6h.





7. Cont Lasix  20 mg daily.





8. Anemia W/U and Bone  marrow  biopsy











NATA Grey MD Nov 14, 2017 18:59

## 2017-11-14 NOTE — MB
cc:

DEONTE MOJICA ABDUL J. M.D.

****

 

 

DATE OF CONSULTATION

2017

 

 

 

REASON FOR CONSULTATION

Consult requested by intensivist, Dr. Mojica, for evaluation of pancytopenia with

macrocytosis.

 

HISTORY OF PRESENT ILLNESS

Chloe is a pleasant 56-year-old female.  She has a history of cigarette

smoking which she quit about 5 years ago.  She has developed COPD and she is

under the care of Dr. Grey.  The patient was admitted to the hospital with

cough and shortness of breath.  The workup revealed highly complex fluid or

soft tissue mass involving the left lung base with mass effect.  This measures

12.2 cm.  The patient was admitted to the hospital.  She initially underwent

thoracentesis and the cytology was negative.  Subsequently the patient

underwent thoracotomy and decortication.  The pathology of the pleura is benign

with no malignancy noted.

 

The patient was doing fine up until yesterday.  She went into respiratory

failure requiring intubation.  She had a bronchoscopy by Dr. Grey yesterday.

The patient was extubated today.  I have been asked to see the patient for

pancytopenia with macrocytosis and normal B12.

 

The patient denies any previous history of pancytopenia.  She stated that when

she was in [place] she was in the hospital due to diarrhea and nausea and

abdominal pain.  She does not recall whether her blood counts were low at that

time.  She denies any bleeding episodes.  Her appetite is okay.  She has been

complaining of shortness of breath and cough.

 

PAST MEDICAL HISTORY

COPD.

 

PAST SURGICAL HISTORY

None.

 

ALLERGIES

None.

 

MEDICATIONS

Please see EMR.

 

FAMILY HISTORY

Father  from lung cancer.

 

SOCIAL HISTORY

The patient used to smoke cigarettes one to two packs a day for 30 years, quit

5 years ago.  She drinks alcohol occasionally.

 

PHYSICAL EXAMINATION

GENERAL:  This a well-developed, well-nourished white female in no apparent

distress.

VITAL SIGNS: Temperature 98.2, heart rate is 113, blood pressure 102/70, O2

suture saturation 93%.

HEENT:  PERRLA.  EOMI.  Anicteric.  No oral lesions are noted.  NECK:  Supple.

 

LYMPHATICS:  There is no cervical, supraclavicular or axillary lymphadenopathy

noted.

LUNGS:  Decreased breath sounds on both sides.

HEART:  Tachycardic with no murmur.

ABDOMEN:  Soft, nontender.

EXTREMITIES:  No pedal edema.

NEUROLOGY:  Awake, alert, oriented x 3.

SKIN:  No significant lesions noted.

 

ASSESSMENT

Pancytopenia with macrocytosis and normal B12.  I suspect that she has

myelodysplastic syndrome until proven otherwise.

 

PLAN

I have reviewed her available records and I had an extensive discussion with

the patient regarding the pancytopenia.  She has macrocytosis.  The B12 is

normal.  I suspect that she has myelodysplastic syndrome and I recommended bone

marrow aspirate and biopsy.  However, the patient has declined the bone marrow

biopsy at this time.  She states that she has been through a lot.  She recently

underwent thoracotomy and decortication.  Yesterday she went on the ventilator

and today she was extubated.  She stated she needs more time to recover from

this.  She does not want to go through the bone marrow biopsy at this time.

 

I will check the serum folate to evaluate for any folate deficiency.  The iron

studies are consistent with anemia of chronic disease but I will check the

serum ferritin.  Her serum iron is low at 21, TIBC is low at 134 and iron

saturation is 15.6.  This is not due to iron deficiency.  In iron deficiency I

expect the TIBC to be high but her TIBC is low but we need to check the serum

ferritin as well.  Her serum B12 is 500.  TSH is 3.8.

 

I recommend to monitor her CBC and give her blood transfusion if the hemoglobin

is less than 8 and platelet transfusion  if the platelet count is less than 15 
or any

bleeding .

 

Thank you for asking my opinion.

 

 

 

                              _________________________________

                              MD CONSTANCE Salinas/DEVEN

D:  2017/9:49 PM

T:  2017/6:41 AM

Visit #:  [id number]

Job #:  [id number]

LORETA

## 2017-11-15 NOTE — HHI.PR
Subjective


Remarks


The patient is a 56-year-old female with past medical history of COPD, who 

presented to [facility] ED with complaints of coughing, wheezing. 

The patient states that she had food poisoning last week where she had nausea 

and diarrhea, after she ate suspicious food. She denies any worsening of 

dyspnea from baseline. In addition, she denies any constitutional symptoms. She 

quit smoking five and a half years ago and used to smoke one to two packs per 

day for about 30 years.  Chest x-ray in the ER showed consolidation with large 

effusion and volume loss on the left.  On further history she denies any 

hemoptysis, however, she reports 20 pounds weight loss in the last 4 months and 

decreased p.o. intake.  She is being followed up by Dr. Grey her outpatient 

pulmonologist.  The patient denies any use of oxygen at home, however, she is 

on bronchodilators p.r.n. Her laboratory data is significant for hyponatremia 

with sodium level 125, lactic acid level 2.1.  On arrival to the ER she was 

tachycardic and tachypneic.  When seen the patient is on 4 liters nasal cannula 

with saturation ranges between %.  In the ER she was given Rocephin, 

azithromycin, Solu-Medrol, bronchodilator treatment and IV fluids.  Her current 

blood pressure is 107/61.





10/27 Patient s/p CT guided thoracentesis and CT placement by IR yesterday. She 

is feeling better. 





Subjective:


10/28 She complains of pain and requests increased pain meds. Repeat imaging 

ordered per pulmonology to evaluate mass vs loculated effusion. On NC. 





[facility] RECONSULT NOTE 11/12/17


Critical care consulted for severe hypoxemic respiratory failure.  On 11/8/17 

patient had left thoracotomy for decortication, evacuation of complex loculated 

effusion, adhesiolysis by Dr. Rodriguez.  CXR 11/11/17 showed near complete 

whiteout of the left lung-pulmonary Dr. Khalil is following.  Today a HALICAT 

was called as the patient was found profoundly hypoxemic in the 70s oxygen 

saturation.  She was placed on 100% percent nonrebreather with improvement in 

saturation only to 81%.  Stat ABG on 100% nonrebreather showed pH of 7.29 PCO2 

of 62 pO2 of 53 and oxygen saturation 79%.  Stat chest x-ray showed complete 

whiteout of the left lung.  I discussed with the patient briefly, she is 

agreeable for intubation.  I proceeded with endotracheal intubation and placed 

on mechanical ventilation -patient saturation gradually improved to 100%.  

Discussed with pulmonology Dr. Khalil.  He plans to do bronchoscopy today 

afternoon. 





SUBJ 11/13: Patient had bronchoscopy by Dr. Khalil yesterday.  Chest x-ray 

today shows improvement in aeration but still with volume loss left lower lung 

fields which is unchanged from the post thoracotomy chest x-rays.  Wide-awake 

on vent tolerating CPAP will proceed with weaning trials





11-14 TRANSFERRED BACK TO OUR SERVICE


EXTUBATED YESTERDAY


GETTING BLOOD 1 UNIT PRBC TODAY


PT AND OT


DW RN AND PT


LESS SOB THAN YESTERDAY








11-15 REFUSING BONE MARROW BIOPSY


NO BLOOD TRANSFUSION TODAY


NEED PT AND OT


BECOMING MOBILE


HAD CHEST TUBES OUT


AM LABS


HAD BM


DW RN AND PT





Objective


Vitals





Vital Signs








  Date Time  Temp Pulse Resp B/P (MAP) Pulse Ox O2 Delivery O2 Flow Rate FiO2


 


11/15/17 14:00  89      


 


11/15/17 13:00  103      


 


11/15/17 12:15     96   21


 


11/15/17 12:04   22     


 


11/15/17 12:00  96      


 


11/15/17 11:00     100 Nasal Cannula 2.00 


 


11/15/17 11:00  99      


 


11/15/17 11:00 97.5 99 22 111/65 (80) 100   


 


11/15/17 10:00  110      


 


11/15/17 09:00  120      


 


11/15/17 08:00  96      


 


11/15/17 07:27     96 Nasal Cannula 2.00 


 


11/15/17 07:00     98 Nasal Cannula 2.00 


 


11/15/17 07:00 97.8 108 20 105/56 (72) 98   


 


11/15/17 07:00  108      


 


11/15/17 06:00  89      


 


11/15/17 05:00  87      


 


11/15/17 04:00  103      


 


11/15/17 03:45     96 Nasal Cannula 2.00 


 


11/15/17 03:45 97.7 106 18 114/67 (83) 96   


 


11/15/17 03:00  80      


 


11/15/17 02:00  87      


 


11/15/17 01:00  85      


 


11/15/17 00:00  93      


 


11/14/17 23:20     97 Nasal Cannula 2.00 


 


11/14/17 23:20 97.7 98 16 108/58 (75) 97   


 


11/14/17 23:00  87      


 


11/14/17 22:00  100      


 


11/14/17 21:00  110      


 


11/14/17 20:30     98 Nasal Cannula 2.00 


 


11/14/17 20:30 97.9 104 18 108/69 (82) 98   


 


11/14/17 20:00  96      


 


11/14/17 19:56     98 Nasal Cannula 2.00 


 


11/14/17 19:00  95      


 


11/14/17 18:05  101      


 


11/14/17 17:00  94      


 


11/14/17 15:00  115      


 


11/14/17 15:00     99 Nasal Cannula 2.00 


 


11/14/17 15:00 98.1 109 17 104/65 (78) 99   














I/O      


 


 11/14/17 11/14/17 11/14/17 11/15/17 11/15/17 11/15/17





 07:00 15:00 23:00 07:00 15:00 23:00


 


Intake Total 420 ml 1545 ml 580 ml 300 ml  


 


Output Total 750 ml 1560 ml 80 ml 60 ml 800 ml 


 


Balance -330 ml -15 ml 500 ml 240 ml -800 ml 


 


      


 


Intake Oral 420 ml 625 ml 480 ml 300 ml  


 


IV Total  500 ml 100 ml   


 


Packed Cells  400 ml    


 


Blood Product IV Normal Saline Flush  20 ml    


 


Output Urine Total 750 ml 1500 ml  0 ml 800 ml 


 


Chest Tube Drainage Total  60 ml 80 ml 60 ml  


 


Bladder Scan Volume Amount    200 ml  





    200 ml  





    730 ml  





    730 ml  


 


# Voids   1   


 


# Bowel Movements 0 0  0  








Result Diagram:  


11/15/17 0437                                                                  

              11/15/17 0437





Other Results





 Laboratory Tests








Test


  11/13/17


04:45 11/13/17


06:00 11/14/17


04:18 11/15/17


04:37


 


Blood Urea Nitrogen 7 MG/DL   9 MG/DL  8 MG/DL 


 


Creatinine 0.29 MG/DL   0.41 MG/DL  0.35 MG/DL 


 


Random Glucose 94 MG/DL   131 MG/DL  138 MG/DL 


 


Total Protein 4.2 GM/DL   4.2 GM/DL  4.9 GM/DL 


 


Albumin 1.7 GM/DL   1.8 GM/DL  2.4 GM/DL 


 


Calcium Level 7.3 MG/DL   7.7 MG/DL  8.1 MG/DL 


 


Magnesium Level 1.8 MG/DL   2.1 MG/DL  2.1 MG/DL 


 


Alkaline Phosphatase 118 U/L   123 U/L  162 U/L 


 


Aspartate Amino Transf


(AST/SGOT) 20 U/L 


  


  19 U/L 


  24 U/L 


 


 


Alanine Aminotransferase


(ALT/SGPT) 15 U/L 


  


  19 U/L 


  29 U/L 


 


 


Total Bilirubin 0.6 MG/DL   0.4 MG/DL  0.8 MG/DL 


 


Sodium Level 136 MEQ/L   136 MEQ/L  139 MEQ/L 


 


Potassium Level 3.1 MEQ/L   3.6 MEQ/L  3.1 MEQ/L 


 


Chloride Level 98 MEQ/L   102 MEQ/L  102 MEQ/L 


 


Carbon Dioxide Level 26.2 MEQ/L   28.1 MEQ/L  29.5 MEQ/L 


 


Anion Gap 12 MEQ/L   6 MEQ/L  8 MEQ/L 


 


Estimat Glomerular Filtration


Rate 239 ML/MIN 


  


  160 ML/MIN 


  193 ML/MIN 


 


 


Protein Corrected Calcium 9.0 MG/DL    


 


Ferritin 418 NG/ML    


 


Folate 3.9 NG/ML    


 


White Blood Count  3.4 TH/MM3  3.3 TH/MM3  3.4 TH/MM3 


 


Red Blood Count  2.15 MIL/MM3  1.99 MIL/MM3  2.64 MIL/MM3 


 


Hemoglobin  7.4 GM/DL  7.0 GM/DL  8.9 GM/DL 


 


Hematocrit  22.1 %  20.5 %  26.5 % 


 


Mean Corpuscular Volume  102.5 FL  103.4 FL  100.6 FL 


 


Mean Corpuscular Hemoglobin  34.4 PG  35.1 PG  33.9 PG 


 


Mean Corpuscular Hemoglobin


Concent 


  33.6 % 


  33.9 % 


  33.7 % 


 


 


Red Cell Distribution Width  20.9 %  21.8 %  20.9 % 


 


Platelet Count  49 TH/MM3  45 TH/MM3  48 TH/MM3 


 


Mean Platelet Volume  8.9 FL  9.4 FL  9.5 FL 


 


Neutrophils (%) (Auto)  82.6 %  84.6 %  86.6 % 


 


Lymphocytes (%) (Auto)  13.5 %  10.5 %  8.0 % 


 


Monocytes (%) (Auto)  3.8 %  4.9 %  5.3 % 


 


Eosinophils (%) (Auto)  0.0 %  0.0 %  0.0 % 


 


Basophils (%) (Auto)  0.1 %  0.0 %  0.1 % 


 


Neutrophils # (Auto)  2.8 TH/MM3  2.8 TH/MM3  2.9 TH/MM3 


 


Lymphocytes # (Auto)  0.5 TH/MM3  0.3 TH/MM3  0.3 TH/MM3 


 


Monocytes # (Auto)  0.1 TH/MM3  0.2 TH/MM3  0.2 TH/MM3 


 


Eosinophils # (Auto)  0.0 TH/MM3  0.0 TH/MM3  0.0 TH/MM3 


 


Basophils # (Auto)  0.0 TH/MM3  0.0 TH/MM3  0.0 TH/MM3 


 


CBC Comment  AUTO DIFF  AUTO DIFF  AUTO DIFF 


 


Differential Comment


  


  AUTO DIFF


CONFIRMED AUTO DIFF


CONFIRMED AUTO DIFF


CONFIRMED


 


Platelet Estimate  LOW  LOW  LOW 


 


Platelet Morphology Comment  NORMAL  NORMAL  NORMAL 


 


Target Cells     


 


Phosphorus Level   1.9 MG/DL  1.8 MG/DL 


 


Lactic Acid Level   0.7 mmol/L  


 


Random Cortisol   11.9 MCG/DL  


 


Free Thyroxine    0.98 NG/DL 


 


Thyroid Stimulating Hormone


3rd Gen 


  


  


  1.590 uIU/ML 


 


 


Test


  11/15/17


14:00 


  


  


 








Imaging





Last Impressions








Chest X-Ray 11/15/17 1100 Signed





Impressions: 





 Service Date/Time:  Wednesday, November 15, 2017 11:08 - CONCLUSION:  1. Small 





 left subpulmonic pneumothorax. 2. Bilateral pulmonary infiltrates.     Efrain Gomez Jr., MD 


 


Abdomen X-Ray 11/12/17 1820 Signed





Impressions: 





 Service Date/Time:  Sunday, November 12, 2017 18:30 - CONCLUSION:  1. No 

obvious 





 obstruction or pneumoperitoneum on this limited view of the abdomen. 2. 





 Nasogastric tube with the tip projecting over the proximal gastric body. 3. 





 Volume loss in the left hemithorax with stable position of 2 thoracostomy 

tubes 





 and left basilar consolidation. .     Chris Kumari MD 


 


Chest CT 10/28/17 0000 Signed





Impressions: 





 Service Date/Time:  Saturday, October 28, 2017 14:44 - CONCLUSION:  1. 

Interval 





 placement of left-sided chest tube with slight decrease in the complex 





 low-density fluid collection in the lower left hemithorax. 2. Volume loss 





 remains in the left hemithorax with consolidation in the left infrahilar 

region 





 3. New small right pleural effusion and consolidation in the right posterior 





 lower lobe 4. The previously noted fluid and debris in the left mainstem 





 bronchus is no longer present.     Ibrahima Mccarthy MD 


 


Chest Ultrasound 10/27/17 0000 Signed





Impressions: 





 Service Date/Time:  Friday, October 27, 2017 09:59 - CONCLUSION:  No focal 





 collections of anechoic free fluid.  Prominent amount of heterogeneous 





 echotexture material surrounding the left lower chest.     Efrain Samuels MD 


 


Chest Tube Insertion 10/26/17 1224 Signed





Impressions: 





 Service Date/Time:  Thursday, October 26, 2017 13:22 - CONCLUSION: 

Uncomplicated 





 CT-guided thoracentesis.     Jorge Carrillo MD 








Objective Remarks


GENERAL: Awake alert oriented 3 --talkative and cooperative


SKIN: Warm and dry.  No obvious rashes


HEAD: Atraumatic. Normocephalic. 


EYES: Pupils equal and round. No scleral icterus. No injection or drainage.  

Extraocular muscles intact


ENT: No nasal bleeding or discharge.  Mucous membranes pink and moist.  Tongue 

is midline


NECK: Trachea midline. No JVD.  Supple


CARDIOVASCULAR: Regular rate and rhythm.  S1-S2 no S3 or S4 no heave or thrill 

or rub


RESPIRATORY: No accessory muscle use.  Breath sounds equal bilaterally.  Coarse 

breath sounds bilaterally


GASTROINTESTINAL: Abdomen soft, non-tender, nondistended. Hepatic and splenic 

margins not palpable. 


MUSCULOSKELETAL: Extremities without clubbing, cyanosis, or edema. No obvious 

deformities.  Bilateral lower extremity weakness


NEUROLOGICAL: Awake and alert. No obvious cranial nerve deficits.  Motor 

grossly within normal limits.  4 out of 5  muscle strength in the arms and 3 

out of 5 muscle strength in the legs.  Normal speech.


PSYCHIATRIC: Appropriate mood and affect; insight and judgment ABnormal.


Procedures


CT guided thoracentesis with L chest tube placement 10/26


Left thoracoscopic exploration, left thoracotomy for decortication, evacuation 

of complex loculated effusion, adhesiolysis 11/8/17 11-12 INTUBATED


11-13 EXTUBATED


11-13 BRONCHOSCOPY WITH BAL OF LEFT SIDE


Medications and IVs





Current Medications


Sodium Chloride (NS Flush) 2 ml UNSCH  PRN IVF FLUSH AFTER USING IV ACCESS Last 

administered on 11/5/17at 21:04;  Start 10/26/17 at 10:15;  Stop 11/7/17 at 17:

47;  Status DC


Methylprednisolone Sodium Succinate (SoluMEDROL INJ) 125 mg ONCE  ONCE IV PUSH  

Last administered on 10/26/17at 10:17;  Start 10/26/17 at 10:15;  Stop 10/26/17 

at 10:16;  Status DC


Albuterol/ Ipratropium (Duoneb Neb) 1 ampule ONCE  ONCE INH  Last administered 

on 10/26/17at 10:13;  Start 10/26/17 at 10:15;  Stop 10/26/17 at 10:16;  Status 

DC


Albuterol Sulfate (Albuterol Neb) 2.5 mg Q15M INH  Last administered on 10/26/

17at 10:13;  Start 10/26/17 at 10:15;  Stop 10/26/17 at 10:31;  Status DC


Sodium Chloride 1,000 ml @  125 mls/hr Q8H IV  Last administered on 10/26/17at 

10:17;  Start 10/26/17 at 10:15;  Stop 10/26/17 at 11:35;  Status DC


Ondansetron HCl (Zofran Inj) 4 mg ONCE  ONCE IV PUSH  Last administered on 10/26

/17at 10:41;  Start 10/26/17 at 10:45;  Stop 10/26/17 at 10:46;  Status DC


Ceftriaxone Sodium 1000 mg/ Sodium Chloride 100 ml @  200 mls/hr ONCE  ONCE IV  

Last administered on 10/26/17at 11:54;  Start 10/26/17 at 11:15;  Stop 10/26/17 

at 11:44;  Status DC


Azithromycin 500 mg/Sodium Chloride 250 ml @  250 mls/hr ONCE  ONCE IV  Last 

administered on 10/26/17at 12:31;  Start 10/26/17 at 11:15;  Stop 10/26/17 at 12

:14;  Status DC


Pantoprazole Sodium (Protonix Inj) 40 mg DAILY IV PUSH  Last administered on 10/

28/17at 08:54;  Start 10/26/17 at 12:00;  Stop 10/28/17 at 14:05;  Status DC


Albuterol/ Ipratropium (Duoneb Neb) 1 ampule Q4HR  NEB INH  Last administered 

on 10/30/17at 07:58;  Start 10/26/17 at 12:00;  Stop 10/30/17 at 11:59;  Status 

DC


Albuterol/ Ipratropium (Duoneb Neb) 1 ampule Q2HR NEB  PRN INH WHEEZING Last 

administered on 11/1/17at 10:14;  Start 10/26/17 at 11:30;  Stop 11/8/17 at 20:

29;  Status DC


Miscellaneous Information 1 Q361D XX  Last administered on 10/26/17at 21:22;  

Start 10/26/17 at 11:30;  Stop 11/4/17 at 23:35;  Status DC


Chlorhexidine Gluconate (Chlorhexidine 2% Cloth) Taper DAILY@04 TOP  Last 

administered on 10/29/17at 04:00;  Start 10/27/17 at 04:00;  Stop 11/4/17 at 23:

35;  Status DC


Chlorhexidine Gluconate (Chlorhexidine 2% Cloth) 3 pack UNSCH  PRN TOP HYGIENIC 

CARE;  Start 10/26/17 at 11:30;  Stop 11/4/17 at 23:35;  Status DC


Senna/Docusate Sodium (Jess-Colace) 1 tab BID PO  Last administered on 11/8/ 17at 08:44;  Start 10/26/17 at 21:00;  Stop 11/8/17 at 20:29;  Status DC


Magnesium Hydroxide (Milk Of Magnesia Liq) 30 ml Q12H  PRN PO Mild constipation 

Last administered on 11/6/17at 20:33;  Start 10/26/17 at 11:30;  Stop 11/8/17 

at 20:29;  Status DC


Sennosides (Senokot) 17.2 mg Q12H  PRN PO Moderate constipation Last 

administered on 11/14/17at 02:19;  Start 10/26/17 at 11:30


Bisacodyl (Dulcolax Supp) 10 mg DAILY  PRN RECTAL SEVERE CONSITIPATION;  Start 

10/26/17 at 11:30;  Stop 11/14/17 at 14:59;  Status DC


Lactulose (Lactulose Liq) 30 ml DAILY  PRN PO SEVERE CONSITIPATION;  Start 10/26

/17 at 11:30;  Status Cancel


Sodium Chloride 1,000 ml @  84 mls/hr Q65S42H IV  Last administered on 10/26/

17at 17:26;  Start 10/26/17 at 11:30;  Stop 10/27/17 at 08:37;  Status DC


Potassium Chloride 100 ml @  50 mls/hr Q2H  PRN IV For Potassium 2.8 - 3.2 mEq/L

;  Start 10/26/17 at 11:30;  Stop 10/28/17 at 14:05;  Status DC


Potassium Chloride 100 ml @  50 mls/hr Q2H  PRN IV For Potassium 2.8 - 3.2 mEq/L

;  Start 10/26/17 at 11:30;  Stop 10/28/17 at 14:05;  Status DC


Potassium Bicarb/ Potassium Chloride (K-Lyte Cl  Eff) 50 meq UNSCH  PRN PO For 

Potassium 3.3 - 3.5 mEq/L;  Start 10/26/17 at 11:30;  Stop 10/28/17 at 14:05;  

Status DC


Potassium Chloride 100 ml @  25 mls/hr UNSCH  PRN IV For Potassium 3.3 - 3.5 mEq

/L;  Start 10/26/17 at 11:30;  Stop 10/28/17 at 14:05;  Status DC


Potassium Chloride 100 ml @  50 mls/hr Q2H  PRN IV For Potassium 3.3 - 3.5 mEq/L

;  Start 10/26/17 at 11:30;  Stop 10/28/17 at 14:05;  Status DC


Magnesium Sulfate 4 gm/Sodium Chloride 100 ml @  50 mls/hr UNSCH  PRN IV For 

Magnesium 0.9 - 1.1 mg/dL;  Start 10/26/17 at 11:30;  Stop 10/28/17 at 14:05;  

Status DC


Magnesium Oxide (Mag-Ox) 800 mg UNSCH  PRN PO For Magnesium 1.2 - 1.6 mg/dL;  

Start 10/26/17 at 11:30;  Stop 10/28/17 at 14:05;  Status DC


Magnesium Sulfate 2 gm/Sodium Chloride 100 ml @  50 mls/hr UNSCH  PRN IV For 

Magnesium 1.2 - 1.6 mg/dL;  Start 10/26/17 at 11:30;  Stop 10/28/17 at 14:05;  

Status DC


Potassium Phosphate (K-Phos) 2,000 mg Q4H  PRN PO For Phosphorus < 2.5 mg/dL;  

Start 10/26/17 at 11:30;  Stop 10/28/17 at 14:05;  Status DC


Sodium Phosphate 30 mmol/Sodium Chloride 250 ml @  42 mls/hr UNSCH  PRN IV For 

Phosphorus < 2.5 mg/dL;  Start 10/26/17 at 11:30;  Stop 10/28/17 at 14:05;  

Status DC


Potassium Phosphate (K-Phos) 2,000 mg UNSCH  PRN PO/TUBE SEE LABEL COMMENTS;  

Start 10/26/17 at 11:30;  Stop 10/28/17 at 14:05;  Status DC


Potassium Phosphate 30 mmol/ Sodium Chloride 260 ml @  42 mls/hr UNSCH  PRN IV 

SEE LABEL COMMENTS Last administered on 10/27/17at 11:46;  Start 10/26/17 at 11:

30;  Stop 10/28/17 at 14:05;  Status DC


Dextrose (D50w (Vial) Inj) 50 ml UNSCH  PRN IV PUSH HYPOGLYCEMIA-SEE COMMENTS;  

Start 10/26/17 at 11:30;  Stop 11/4/17 at 23:34;  Status DC


Glucagon (Glucagon Inj) 1 mg UNSCH  PRN OTHER HYPOGLYCEMIA-SEE COMMENTS;  Start 

10/26/17 at 11:30;  Stop 11/4/17 at 23:34;  Status DC


Insulin Human Regular (NovoLIN R SUPPLEMENTAL SCALE) 1 Q6H SQ  Last 

administered on 10/29/17at 17:30;  Start 10/26/17 at 11:30;  Stop 11/4/17 at 23:

34;  Status DC


Ceftriaxone Sodium 1000 mg/ Sodium Chloride 100 ml @  200 mls/hr Q24H IV  Last 

administered on 10/31/17at 12:20;  Start 10/27/17 at 12:00;  Stop 11/1/17 at 11:

59;  Status DC


Azithromycin 500 mg/Sodium Chloride 250 ml @  250 mls/hr Q24H IV  Last 

administered on 10/28/17at 11:48;  Start 10/27/17 at 13:00;  Stop 10/28/17 at 15

:20;  Status DC


Methylprednisolone Sodium Succinate (SoluMEDROL INJ) 60 mg Q6HR IV PUSH  Last 

administered on 10/27/17at 12:31;  Start 10/26/17 at 16:00;  Stop 10/27/17 at 13

:03;  Status DC


Lidocaine/ Epinephrine (Xylocaine-Epi 1%-1:100,000 Inj) 20 ml STK-MED ONCE 

.ROUTE  Last administered on 10/26/17at 12:38;  Start 10/26/17 at 12:38;  Stop 

10/26/17 at 12:39;  Status DC


Fentanyl Citrate (fentaNYL INJ) 100 mcg STK-MED ONCE .ROUTE  Last administered 

on 10/26/17at 12:47;  Start 10/26/17 at 12:47;  Stop 10/26/17 at 12:48;  Status 

DC


Midazolam HCl (Versed Inj) 2 mg STK-MED ONCE .ROUTE  Last administered on 10/26/

17at 12:47;  Start 10/26/17 at 12:47;  Stop 10/26/17 at 12:48;  Status DC


Iohexol (Omnipaque 350 Inj) 69 ml STK-MED ONCE IVCONTRAST  Last administered on 

10/26/17at 13:33;  Start 10/26/17 at 13:33;  Stop 10/26/17 at 13:34;  Status DC


Ephedrine Sulfate (ePHEDrine/NS 25 MG/5 ML SYR) 25 mg STK-MED ONCE .ROUTE ;  

Start 10/26/17 at 13:40;  Stop 10/26/17 at 13:41;  Status DC


Acetaminophen (Tylenol) 650 mg Q6H  PRN PO PAIN Last administered on 10/27/17at 

04:13;  Start 10/27/17 at 04:15;  Stop 10/30/17 at 11:16;  Status DC


Oxycodone/ Acetaminophen (Percocet  5-325 Mg) 1 tab ONCE  ONCE PO  Last 

administered on 10/27/17at 09:39;  Start 10/27/17 at 09:30;  Stop 10/27/17 at 09

:31;  Status DC


Methylprednisolone Sodium Succinate (SoluMEDROL INJ) 40 mg Q6HR IV PUSH  Last 

administered on 10/29/17at 05:12;  Start 10/27/17 at 18:00;  Stop 10/29/17 at 11

:40;  Status DC


Morphine Sulfate (Morphine Inj) 1 mg ONCE  ONCE IV PUSH  Last administered on 10

/27/17at 15:06;  Start 10/27/17 at 15:00;  Stop 10/27/17 at 15:01;  Status DC


Acetaminophen (Tylenol) 650 mg Q6H  PRN PO PAIN 1-2;  Start 10/27/17 at 22:30


Morphine Sulfate (Morphine Inj) 1 mg Q4H  PRN IV PAIN 5-10 Last administered on 

10/28/17at 11:12;  Start 10/27/17 at 22:30;  Stop 10/28/17 at 13:46;  Status DC


Morphine Sulfate (Morphine Inj) 2 mg Q4H  PRN IV BREAKTHROUGH PAIN 6-10 Last 

administered on 11/8/17at 13:33;  Start 10/28/17 at 14:30;  Stop 11/8/17 at 19:

27;  Status DC


Acetaminophen/ Hydrocodone Bitart (Norco  5-325 Mg) 1 tab Q6H  PRN PO PAIN 3-5 

Last administered on 10/30/17at 05:18;  Start 10/28/17 at 13:45;  Stop 11/6/17 

at 09:01;  Status DC


Acetaminophen/ Hydrocodone Bitart (Norco  5-325 Mg) 2 tab Q6H  PRN PO PAIN 6-10 

Last administered on 11/6/17at 08:39;  Start 10/28/17 at 13:45;  Stop 11/6/17 

at 09:01;  Status DC


Pantoprazole Sodium (Protonix) 40 mg DAILY PO  Last administered on 11/8/17at 08

:41;  Start 10/29/17 at 09:00;  Stop 11/8/17 at 20:29;  Status DC


Iohexol (Omnipaque 350 Inj) 70 ml STK-MED ONCE IVCONTRAST  Last administered on 

10/28/17at 14:59;  Start 10/28/17 at 14:59;  Stop 10/28/17 at 15:00;  Status DC


Azithromycin 500 mg/Sodium Chloride 250 ml @  250 mls/hr Q24H IV  Last 

administered on 11/1/17at 11:57;  Start 10/30/17 at 13:00;  Stop 11/1/17 at 14:

46;  Status DC


Methylprednisolone Sodium Succinate (SoluMEDROL INJ) 40 mg Q12HR IV PUSH  Last 

administered on 10/31/17at 07:37;  Start 10/29/17 at 21:00;  Stop 10/31/17 at 13

:51;  Status DC


Methylprednisolone Sodium Succinate (SoluMEDROL INJ) 40 mg DAILY IV PUSH  Last 

administered on 11/2/17at 08:15;  Start 11/1/17 at 09:00;  Stop 11/2/17 at 18:19

;  Status DC


Nifedipine (Procardia Xl) 60 mg ONCE  ONCE PO  Last administered on 11/2/17at 17

:49;  Start 11/2/17 at 17:30;  Stop 11/2/17 at 17:32;  Status DC


Nifedipine (Procardia Xl) 30 mg DAILY PO  Last administered on 11/14/17at 08:32

;  Start 11/3/17 at 09:00;  Status Future Hold


Furosemide (Lasix) 20 mg DAILY PO  Last administered on 11/12/17at 09:21;  

Start 11/2/17 at 17:35;  Status Future Hold


Prednisone (Deltasone) 10 mg DAILY PO  Last administered on 11/12/17at 09:21;  

Start 11/5/17 at 09:00;  Stop 11/14/17 at 14:59;  Status DC


Acetaminophen/ Hydrocodone Bitart (Norco  5-325 Mg) 1 tab Q4H  PRN PO PAIN 3-5;

  Start 11/6/17 at 09:00;  Stop 11/8/17 at 19:27;  Status DC


Acetaminophen/ Hydrocodone Bitart (Norco  5-325 Mg) 2 tab Q4H  PRN PO PAIN 6-10 

Last administered on 11/8/17at 08:47;  Start 11/6/17 at 09:15;  Stop 11/8/17 at 

19:27;  Status DC


Lactulose (Lactulose Liq) 30 ml ONCE  ONCE PO ;  Start 11/7/17 at 13:00;  Stop 

11/7/17 at 13:19;  Status DC


Lactulose (Lactulose Liq) 30 ml QID  PRN PO severe constipation  Last 

administered on 11/8/17at 08:42;  Start 11/7/17 at 13:00


Simethicone (Phazyme Chew) 125 mg ONCE  ONCE PO  Last administered on 11/7/17at 

14:59;  Start 11/7/17 at 13:30;  Stop 11/7/17 at 13:31;  Status DC


Sodium Chloride (NS Flush) 2 ml BID IV FLUSH  Last administered on 11/8/17at 09:

00;  Start 11/7/17 at 21:00;  Stop 11/8/17 at 20:29;  Status DC


Sodium Chloride (NS Flush) 2 ml UNSCH  PRN IV FLUSH FLUSH AFTER USING IV ACCESS

;  Start 11/7/17 at 16:15;  Stop 11/8/17 at 20:29;  Status DC


Cefazolin Sodium 500 mg/Sodium Chloride 505 ml @ 0 mls/hr ON  CALL IRRIGATION ;

  Start 11/7/17 at 16:15;  Stop 11/14/17 at 14:59;  Status DC


Cefazolin Sodium/ Dextrose 50 ml @  150 mls/hr ON  CALL IV  Last administered 

on 11/8/17at 16:17;  Start 11/7/17 at 16:15;  Stop 11/14/17 at 14:59;  Status DC


Chlorhexidine Gluconate (Hibiclens 4% Top Soln) 1 applic ON  CALL TOPICAL  Last 

administered on 11/8/17at 08:42;  Start 11/7/17 at 16:15;  Stop 11/9/17 at 08:40

;  Status DC


Dextrose (D50w (Vial) Inj) 50 ml UNSCH  PRN IV PUSH HYPOGLYCEMIA-SEE COMMENTS;  

Start 11/7/17 at 16:15


Lactated Ringer's 1,000 ml @  30 mls/hr Q24H PRN IV SEE LABEL COMMENTS Last 

administered on 11/8/17at 14:53;  Start 11/7/17 at 18:30;  Stop 11/8/17 at 20:23

;  Status DC


Sodium Chloride 500 ml @  30 mls/hr [id number] PRN IV SEE LABEL COMMENTS;  Start 11 /7/17 at 18:30;  Stop 11/8/17 at 20:23;  Status DC


Metoprolol Tartrate (Lopressor) 25 mg ON CALL  PRN PO SEE LABEL COMMENTS;  

Start 11/7/17 at 18:30;  Stop 11/8/17 at 20:23;  Status DC


Povidone Iodine (Betadine 5% Antisepsis Kit) 1 applic ON CALL  PRN EACH NARE 

SEE LABEL COMMENTS;  Start 11/7/17 at 18:30;  Stop 11/8/17 at 20:23;  Status DC


Chlorhexidine Gluconate (Chlorhexidine 2% Cloth) 3 pack ON CALL  PRN TOPICAL 

SEE LABEL COMMENTS;  Start 11/7/17 at 18:30;  Stop 11/9/17 at 08:40;  Status DC


Insulin Human Regular (NovoLIN R INJ) See Protocol Table ... ON CALL  PRN SQ 

SEE PROTOCOL TABLE;  Start 11/7/17 at 18:30;  Stop 11/8/17 at 20:23;  Status DC


Albuterol/ Ipratropium (Duoneb Neb) 1 ampule Q6HR  NEB NEB  Last administered 

on 11/8/17at 09:40;  Start 11/7/17 at 22:00;  Stop 11/8/17 at 20:29;  Status DC


Bupivacaine HCl (Marcaine Pf 0.5% Inj) 30 ml ONCE  ONCE INFIL  Last 

administered on 11/8/17at 17:51;  Start 11/8/17 at 17:51;  Stop 11/8/17 at 17:53

;  Status DC


Albuterol Sulfate (Albuterol Neb) 2.5 mg Q6HR  NEB NEB  Last administered on 11/

10/17at 07:08;  Start 11/8/17 at 22:00;  Stop 11/10/17 at 12:20;  Status DC


Albuterol Sulfate (Albuterol Neb) 2.5 mg Q2HR NEB  PRN NEB WHEEZING Last 

administered on 11/15/17at 12:05;  Start 11/8/17 at 19:30


IV Flush (NS Flush) 2 ml BID IV FLUSH  Last administered on 11/15/17at 09:00;  

Start 11/8/17 at 21:00


IV Flush (NS Flush) 2 ml UNSCH  PRN IV FLUSH FLUSH AFTER USING IV ACCESS;  

Start 11/8/17 at 19:30


Miscellaneous Information (Post-op Orders (for Pharmacy))  STAT  ONCE OTHER ;  

Start 11/8/17 at 19:30;  Stop 11/8/17 at 20:27;  Status DC


Pantoprazole Sodium (Protonix) 40 mg HS PO  Last administered on 11/14/17at 21:

38;  Start 11/8/17 at 21:00


Ondansetron HCl (Zofran Inj) 4 mg Q6H  PRN IV PUSH NAUSEA OR VOMITING Last 

administered on 11/12/17at 05:58;  Start 11/8/17 at 19:30


Docusate Calcium (Surfak) 240 mg HS PO  Last administered on 11/8/17at 23:35;  

Start 11/8/17 at 21:00;  Stop 11/9/17 at 08:40;  Status DC


Magnesium Hydroxide (Milk Of Magnesia Liq) 30 ml DAILY  PRN PO MILD CONSTIPATION

;  Start 11/8/17 at 19:30


Acetaminophen 100 ml @  400 mls/hr Q6H IV  Last administered on 11/9/17at 19:24

;  Start 11/8/17 at 21:00;  Stop 11/9/17 at 15:14;  Status DC


Naloxone HCl (Narcan Inj) 0.4 mg UNSCH  PRN IV PUSH RESPIRATORY RATE LESS THAN 

10;  Start 11/8/17 at 19:30;  Stop 11/13/17 at 18:00;  Status DC


Morphine Sulfate (Morphine 1 Mg/ ml PCA) 30 mg UNSCH IV  Last administered on 11 /12/17at 09:29;  Start 11/8/17 at 19:30;  Stop 11/13/17 at 18:00;  Status DC


PCA Dosage Infused (Pha) 1 Q8HR .XX  Last administered on 11/12/17at 06:00;  

Start 11/8/17 at 22:00;  Stop 11/13/17 at 18:00;  Status DC


Morphine Sulfate (*morphine INJ PERIprocedure ONLY) 8 mg STK-MED ONCE .ROUTE  

Last administered on 11/8/17at 20:10;  Start 11/8/17 at 20:08;  Stop 11/9/17 at 

08:40;  Status DC


Morphine Sulfate (*morphine INJ PERIprocedure ONLY) 8 mg STK-MED ONCE .ROUTE  

Last administered on 11/8/17at 20:30;  Start 11/8/17 at 20:22;  Stop 11/9/17 at 

08:40;  Status DC


Meperidine HCl (*DEMEROL INJ PERIprocedural ONLY) 25 mg STK-MED ONCE .ROUTE  

Last administered on 11/8/17at 20:23;  Start 11/8/17 at 20:22;  Stop 11/9/17 at 

08:40;  Status DC


Morphine Sulfate (Morphine 1 Mg/ ml PCA) 30 mg STK-MED ONCE .ROUTE ;  Start 11/8 /17 at 20:25;  Stop 11/8/17 at 20:26;  Status DC


Sodium Chloride 1,000 ml @  30 mls/hr Q24H IV  Last administered on 11/11/17at 

21:00;  Start 11/8/17 at 21:00


Miscellaneous Information ALL NURSING DEPARTME... UNSCH  PRN .XX SEE LABEL 

COMMENTS;  Start 11/8/17 at 19:55;  Stop 11/9/17 at 19:54;  Status DC


Polyethylene Glycol (Miralax) 17 gm DAILY PO  Last administered on 11/15/17at 09

:00;  Start 11/9/17 at 09:00


Potassium Chloride (KCl) 30 meq ONCE  ONCE PO  Last administered on 11/9/17at 10

:07;  Start 11/9/17 at 08:45;  Stop 11/9/17 at 08:46;  Status DC


Potassium Chloride (KCl) 10 meq DAILY PO  Last administered on 11/15/17at 09:00

;  Start 11/9/17 at 09:00


Cefazolin Sodium 1000 mg/Sodium Chloride 100 ml @  200 mls/hr Q8H IV ;  Start 11 /9/17 at 15:45;  Stop 11/9/17 at 15:45;  Status DC


Cefazolin Sodium 1000 mg/Sodium Chloride 100 ml @  200 mls/hr Q8H IV  Last 

administered on 11/10/17at 08:04;  Start 11/9/17 at 16:00;  Stop 11/10/17 at 08:

19;  Status DC


Ceftriaxone Sodium 1000 mg/ Sodium Chloride 100 ml @  200 mls/hr Q24H IV  Last 

administered on 11/14/17at 22:13;  Start 11/9/17 at 23:00


Furosemide (Lasix Inj) 20 mg ONCE  ONCE IV PUSH  Last administered on 11/10/

17at 13:08;  Start 11/10/17 at 14:00;  Stop 11/10/17 at 14:01;  Status DC


Potassium Chloride (KCl) 20 meq ONCE  ONCE PO  Last administered on 11/10/17at 

13:07;  Start 11/10/17 at 14:00;  Stop 11/10/17 at 14:01;  Status DC


Albuterol Sulfate (Albuterol Neb) 2.5 mg Q6HR WHILE AWAKE  NEB INH  Last 

administered on 11/12/17at 09:42;  Start 11/10/17 at 14:00;  Stop 11/12/17 at 10

:45;  Status DC


Furosemide (Lasix Inj) 20 mg ONCE  ONCE IV PUSH  Last administered on 11/10/

17at 18:49;  Start 11/10/17 at 17:00;  Stop 11/10/17 at 17:01;  Status DC


Potassium Chloride 100 ml @  50 mls/hr Q2H IV  Last administered on 11/11/17at 

11:26;  Start 11/11/17 at 08:00;  Stop 11/11/17 at 11:59;  Status DC


Etomidate (Amidate Inj) 40 mg STK-MED ONCE .ROUTE  Last administered on 11/12/ 17at 10:58;  Start 11/12/17 at 10:08;  Stop 11/12/17 at 10:09;  Status DC


Midazolam HCl (Versed Inj) 5 mg STK-MED ONCE .ROUTE  Last administered on 11/12/ 17at 10:57;  Start 11/12/17 at 10:09;  Stop 11/12/17 at 10:10;  Status DC


Albuterol Sulfate (Albuterol Neb) 2.5 mg Q4HR NEB  PRN NEB DYSPNEA;  Start 11/12 /17 at 10:30


Propofol 50 ml @ As Directed STK-MED ONCE .ROUTE  Last administered on 11/12/ 17at 10:59;  Start 11/12/17 at 10:18;  Stop 11/12/17 at 10:19;  Status DC


Chlorhexidine Gluconate (Peridex 0.12% Liq) 15 ml BID@08,20 MT  Last 

administered on 11/12/17at 21:32;  Start 11/12/17 at 20:00


Propofol 100 ml @  2.034 mls/ hr TITRATE  PRN IV SEDATION Last administered on 

11/13/17at 05:49;  Start 11/12/17 at 10:30;  Stop 11/13/17 at 18:00;  Status DC


Fentanyl Citrate 250 ml @ 5 mls/hr TITRATE  PRN IV SEDATION Last administered 

on 11/12/17at 18:25;  Start 11/12/17 at 10:30;  Stop 11/13/17 at 18:00;  Status 

DC


Albuterol/ Ipratropium (Duoneb Neb) 1 ampule Q4HR  NEB NEB  Last administered 

on 11/15/17at 07:22;  Start 11/12/17 at 12:00;  Stop 11/15/17 at 10:28;  Status 

DC


Methylprednisolone Sodium Succinate (SoluMEDROL INJ) 60 mg Q12H IV PUSH ;  

Start 11/12/17 at 12:00;  Stop 11/12/17 at 12:00;  Status DC


Methylprednisolone Sodium Succinate (SoluMEDROL INJ) 60 mg Q8H IV PUSH  Last 

administered on 11/15/17at 11:01;  Start 11/12/17 at 12:00;  Stop 11/15/17 at 12

:11;  Status DC


Enoxaparin Sodium (Lovenox Inj) 40 mg Q24H SQ  Last administered on 11/12/17at 

12:54;  Start 11/12/17 at 12:00;  Status Future Hold


Ferrous Sulfate (Ferrous Sulfate Liq) 300 mg BID PO  Last administered on 11/13/ 17at 09:05;  Start 11/12/17 at 14:30;  Stop 11/13/17 at 17:41;  Status DC


Acetylcysteine (Mucomyst 20% Neb) 2 ml Q6HR  NEB NEB ;  Start 11/12/17 at 16:00

;  Stop 11/12/17 at 17:41;  Status DC


Non-Formulary Medication 2 ML NEB EVERY 6 HOURS Q6HR  NEB NEB  Last 

administered on 11/14/17at 04:25;  Start 11/12/17 at 18:00;  Stop 11/15/17 at 10

:41;  Status DC


Potassium Bicarb/ Potassium Chloride (K-Lyte Cl  Eff) 25 meq ONCE  ONCE PO  

Last administered on 11/13/17at 07:26;  Start 11/13/17 at 06:45;  Stop 11/13/17 

at 06:51;  Status DC


Lactulose (Lactulose Liq) 30 ml DAILY NG  Last administered on 11/15/17at 09:00

;  Start 11/13/17 at 09:00


Docusate Sodium (Colace) 100 mg BID PO  Last administered on 11/15/17at 09:00;  

Start 11/13/17 at 09:00


Magnesium Sulfate/ Dextrose 100 ml @  100 mls/hr ONCE  ONCE IV  Last 

administered on 11/13/17at 08:57;  Start 11/13/17 at 08:45;  Stop 11/13/17 at 09

:44;  Status DC


Sodium Chloride 250 ml @ 0 mls/hr BOLUS  ONCE IV  Last administered on 11/13/ 17at 10:30;  Start 11/13/17 at 10:30;  Stop 11/13/17 at 10:31;  Status DC


Sodium Chloride 500 ml @  500 mls/hr BOLUS  ONCE IV  Last administered on 11/13/ 17at 13:11;  Start 11/13/17 at 13:15;  Stop 11/13/17 at 14:14;  Status DC


Sodium Chloride 500 ml @  500 mls/hr Q1H ONCE IV  Last administered on 11/13/ 17at 17:36;  Start 11/13/17 at 17:30;  Stop 11/13/17 at 18:29;  Status DC


Ferrous Sulfate (Ferrous Sulfate) 325 mg BID PO  Last administered on 11/15/

17at 09:00;  Start 11/13/17 at 21:00


Oxycodone/ Acetaminophen (Percocet 7.5-325 Mg) 1 tab Q6H  PRN PO pain 5-10 Last 

administered on 11/15/17at 11:02;  Start 11/13/17 at 18:15


Albumin Human 500 ml @  250 mls/hr ONCE  ONCE IV  Last administered on 11/14/ 17at 04:26;  Start 11/14/17 at 03:45;  Stop 11/14/17 at 05:44;  Status DC


Furosemide (Lasix Inj) 40 mg STK-MED ONCE .ROUTE  Last administered on 11/14/ 17at 09:04;  Start 11/14/17 at 09:04;  Stop 11/14/17 at 14:59;  Status DC


Potassium Bicarb/ Potassium Chloride (K-Lyte Cl  Eff) 25 meq ONCE  ONCE PO  

Last administered on 11/15/17at 09:00;  Start 11/15/17 at 09:00;  Stop 11/15/17 

at 09:01;  Status DC


Potassium Chloride (KCl) 30 meq ONCE  ONCE PO ;  Start 11/15/17 at 11:00;  Stop 

11/15/17 at 11:00;  Status DC


Potassium Phos/ Sodium Phos (K-Phos Neutral) 250 mg Q8HR PO  Last administered 

on 11/15/17at 13:40;  Start 11/15/17 at 08:30


Bisacodyl (Dulcolax Supp) 10 mg ONCE  ONCE RECTAL  Last administered on 11/15/

17at 11:01;  Start 11/15/17 at 10:00;  Stop 11/15/17 at 10:20;  Status DC


Sodium Biphosphate/ Sodium Phosphate (Fleets Enema (Adult)) 133 ml ONCE  ONCE 

NC ;  Start 11/15/17 at 10:00;  Stop 11/15/17 at 10:20;  Status DC


Potassium Bicarb/ Potassium Chloride (K-Lyte Cl  Eff) 25 meq ONCE  ONCE PO  

Last administered on 11/15/17at 11:01;  Start 11/15/17 at 10:00;  Stop 11/15/17 

at 10:20;  Status DC


Lidocaine HCl (Xylocaine-Mpf 1% Inj) 50 ml STK-MED ONCE OTHER ;  Start 11/8/17 

at 12:00;  Stop 11/15/17 at 10:58;  Status DC


Rocuronium Bromide (Zemuron Inj) 50 mg STK-MED ONCE IV PUSH ;  Start 11/8/17 at 

12:00;  Stop 11/15/17 at 10:58;  Status DC


Neostigmine Methylsulfate (Prostigmin Inj) 3 mg STK-MED ONCE IV ;  Start 11/8/ 17 at 12:00;  Stop 11/15/17 at 10:58;  Status DC


Glycopyrrolate (Robinul Inj) 1 mg STK-MED ONCE IV PUSH ;  Start 11/8/17 at 12:00

;  Stop 11/15/17 at 10:58;  Status DC


Phenylephrine HCl (Neosynephrine/ NS 1000 Mcg/10ml Syr) 1,000 mcg STK-MED ONCE 

IV ;  Start 11/8/17 at 12:00;  Stop 11/15/17 at 10:58;  Status DC


Ondansetron HCl (Zofran Inj) 4 mg STK-MED ONCE IV PUSH ;  Start 11/8/17 at 12:00

;  Stop 11/15/17 at 10:58;  Status DC


Fentanyl Citrate (fentaNYL INJ) 200 mcg STK-MED ONCE IV ;  Start 11/8/17 at 12:

00;  Stop 11/15/17 at 10:58;  Status DC


Morphine Sulfate (Morphine Inj) 4 mg STK-MED ONCE IV ;  Start 11/8/17 at 12:00;

  Stop 11/15/17 at 10:58;  Status DC


Propofol (Diprivan  200 Mg/20 ml Inj) 200 mg STK-MED ONCE IV ;  Start 11/8/17 

at 12:00;  Stop 11/15/17 at 10:58;  Status DC


Parenteral Electrolytes 1,000 ml @  As Directed STK-MED ONCE IV ;  Start 11/8/ 17 at 12:00;  Stop 11/15/17 at 10:58;  Status DC


Lactated Ringer's 1,000 ml @  As Directed STK-MED ONCE IV ;  Start 11/8/17 at 12

:00;  Stop 11/15/17 at 10:58;  Status DC


Methylprednisolone Sodium Succinate (SoluMEDROL INJ) 40 mg BID IV PUSH ;  Start 

11/15/17 at 21:00





A/P


Problem List:  


(1) Pleural effusion on left


ICD Code:  J90 - Pleural effusion, not elsewhere classified


Status:  Acute


(2) Respiratory distress


ICD Code:  R06.00 - Dyspnea, unspecified


Status:  Resolved


(3) Hyponatremia


ICD Code:  E87.1 - Hypo-osmolality and hyponatremia


Status:  Acute


(4) COPD (chronic obstructive pulmonary disease)


ICD Code:  J44.9 - Chronic obstructive pulmonary disease, unspecified


Status:  Chronic


(5) Tobacco abuse


ICD Code:  Z72.0 - Tobacco use


Status:  Chronic


Assessment and Plan


ASSESSMENT 


Acute hypoxemic respiratory failure


Complete left lung atelectasis


Left-sided consolidation with pleural effusion


Left thoracotomy with decortication, evacuation of complex loculated effusion, 

adhesiolysis


Pulmonary hypertension


COPD exacerbation


Hyponatremia/hypophosphatemia


Hypokalemia.


Urinary tract infection on abx








PLAN:


Neuro:


Hold propofol and reduce fentanyl to facilitate weaning trials


Pain control with fentanyl--off all sedation at this time


ORAL PAIN CONTROL





Pulm: 


Acute hypoxemic respiratory failure


Complete L lung atelectasis


S/p decortication for L hemothorax


COPD


Tobacco abuse


?L lung mass vs loculated effusion/


Emergently intubated and placed on mechanical ventilation 11/12


s/p Bronchoscopy by Dr. Grey.  Left lung fields reexpanded, persistent 

volume loss involving left lower lung field


Start weaning trials with possible extubation--extubated November 13


Status post left thoracotomy and decortication 11/8/17-postop diagnosis was 

hemothorax


Bronchodilators, Solumedrol 60 mg IV q8 hours 


s/p CT guided left-sided thoracentesis and CT placement by IR 10/26/17


CT chest: Either highly complex fluid or soft tissue mass involving the left 

lung base with mass effect. This measures 12.2 x 12.6 x 7.4 cm. 


4.4 cm ascending aortic aneurysm.


--Status post bronchoscopy with left sided bronchial alveolar lavage by 

pulmonary on November 13


CHEST TUBE REMOVED





CV:  


Pulmonary hypertension, most likely secondary from chronic hypoxia by echo


Moderate aortic stenosis mild to moderate aortic regurgitation


Monitor HR and BP and maintain MAP>65mmHg


Echo 10/27 - systolic function grossly normal. Moderate AS/AI.  Moderate to 

severe pulmonary hypertension.. 





GI: 


Nothing by mouth-has been extubated


Diet as tolerated





: 


Hyponatremia ?chronic but chronicity not truly known


Hypokalemia, resolved- STILL LOW WILL REPLACE





ID: 


UTI


Possible pneumonia


Continue Rocephin for now


UTI present on admission with culture positive for pansensitive Klebsiella.


Influenza screen, blood and pleural fluid cultures negative to date. 


Temporal fluid studies from November 8, on cultures negative to date





Endo: 


SSI with Accu-Cheks for glycemic control.





Heme: 


Macrocytic anemia 


Anemia of chronic disease and iron deficiency


Monitor CBC.  Normal B12, 


Supplement iron due to her lab findings consistent with iron deficiency


Being transfused 1 unit packed red blood cells today





DVT prophylaxis with  SCDs. Lovenox 40 mg sq daily. IV Famotidine 20 mg IV q12h


NOT BEING TRANSFUSED TODAY





Needs physical therapy and occupational therapy aggressively








NEEDS TO GET MOBILE





AM LABS


Discharge Planning


Needs to be more mobile


May require [facility] IF ABLE TO GET APPROVAL





Problem Qualifiers





(1) COPD (chronic obstructive pulmonary disease):  


Qualified Codes:  J43.1 - Panlobular emphysema








Roland Foote DO Nov 15, 2017 14:45

## 2017-11-15 NOTE — [email or web address]
CVT Progress Note


Subjective/Hospital Course:


56-year-old white female with a history of COPD, chronic bronchitis who 

apparently was suffering from food poisoning.  The patient states that she was 

unable to pass urine and became very weak, had lost weight with diarrhea, 

abdominal discomfort, dehydration and presented to ER for evaluation. She c/o 

20 lb weight loss over the past 4 months. Chest CT done showed evidence of a 

loculated  mass-like infiltrate in the left lung base 12 x 12 cm and a left 

upper lobe lung nodule 7 mm.  The patient was started on Solu-Medrol, IV 

antibiotics including Rocephin and Zithromax.  Denies chest pains or abdominal 

pains or nausea or vomiting. She underwent chest tube placement in IR 10/26


with minimal drainage of this effusion.. Chest tube was removed. Repeat chest x-

ray shows no change in the mod pleural effusion but does shows some mild patchy 

opacities in the right lung base. 


cardiology Dr Sebastián de la torre pt   TTE shows preserved EF and at least moderate AS/AI.





present diagnosis : Respiratory Insufficiency, Left-sided consolidation , 

Exudative pleural effusion, COPD exacerbation, Urinary tract infection, x 

tobacco use


Anemia, thrombocytopenia, Moderate aortic valve regurgitation, Moderate 

calcific aortic valve stenosis





pleural fluid : exudative effusion. Cytology neg for malignant cells. Fluid cx 

neg 


CT chest 10/26 : Either highly complex fluid or soft tissue mass involving the 

left lung base with mass effect. This measures 12.2 x 12.6 x 7.4 cm.  4.4 cm 

ascending aortic aneurysm.





pt is now agreeable for surgery : plan is for left thoracoscopic exploration 

for loculated pleural effusion , possible  thoracotomy , decortication in am 





11/8


on nasal cannula 


surgery: Left thoracoscopic exploration, left thoracotomy for decortication, 

evacuation of complex loculated effusion, adhesiolysis





11/9


pain controlled with PCA morphine 


needs aggressive pulm toileting 


OOB, ambulate 


recheck UA / pleural fluid cultures and path pending 





11/10


 chest tubes drained 130cc/ 12 hrs serous drainage / leave chest tube in for 

now 


CXR noted , from 11/9, mucus plugging, aggressive pulm toileting 


add ezpap and acapella 


now on Rocephin, will dc ancef, await on UA culture 





OOB, ambulate, 





11/11/17


No complaints today.  Denies dyspnea, but CXR shows complete opacification of 

left lung





11/12/17


Hypoxic this morning despite 100% FiO2.  Transferred to CVICU and reintubated.  

Bronchoscopy pending.





11/13


remains in CVICU, hematology consulted 


s/p Bronch 


chest tube to water seal 





11/14


now on nasal cannula 2 liters 


continue pulm toileting 


appreciate Hematology 





will transfer to stepdown  


continue PT/OT





11/15


chest tubes with minimal drainage


both chest tubes removed without difficulty 


on 2 liter nasal cannula 


need aggressive PT/OT


replace K+/ diuresis later if stable 


she will need SNF at discharge / very deconditioned 


will give ducolax supp / fleets today


Objective:


GENERAL: 


SKIN: Warm and dry. poor skin turgor 


HEAD: Normocephalic.


EYES: No scleral icterus. No injection or drainage. 


NECK: Supple, trachea midline. No JVD or lymphadenopathy.


CARDIOVASCULAR: Regular rate and rhythm without murmurs, gallops, or rubs. 


RESPIRATORY: Breath sounds equal bilaterally. No accessory muscle use.


coarse bilateral breath sounds / chest tubes removed without difficultly 


GASTROINTESTINAL: Abdomen soft, non-tender, nondistended. 


MUSCULOSKELETAL: No cyanosis, or edema. 


BACK: Nontender without obvious deformity. No CVA tenderness.








Vital Signs








  Date Time  Temp Pulse Resp B/P (MAP) Pulse Ox O2 Delivery O2 Flow Rate FiO2


 


11/15/17 07:27     96 Nasal Cannula 2.00 


 


11/15/17 06:00  89      


 


11/15/17 05:00  87      


 


11/15/17 04:00  103      


 


11/15/17 03:45     96 Nasal Cannula 2.00 


 


11/15/17 03:45 97.7 106 18 114/67 (83) 96   


 


11/15/17 03:00  80      


 


11/15/17 02:00  87      


 


11/15/17 01:00  85      


 


11/15/17 00:00  93      


 


11/14/17 23:20     97 Nasal Cannula 2.00 


 


11/14/17 23:20 97.7 98 16 108/58 (75) 97   


 


11/14/17 23:00  87      


 


11/14/17 22:00  100      


 


11/14/17 21:00  110      


 


11/14/17 20:30     98 Nasal Cannula 2.00 


 


11/14/17 20:30 97.9 104 18 108/69 (82) 98   


 


11/14/17 20:00  96      


 


11/14/17 19:56     98 Nasal Cannula 2.00 


 


11/14/17 19:00  95      


 


11/14/17 18:05  101      


 


11/14/17 17:36   17     


 


11/14/17 17:00  94      


 


11/14/17 15:00  115      


 


11/14/17 15:00     99 Nasal Cannula 2.00 


 


11/14/17 15:00 98.1 109 17 104/65 (78) 99   


 


11/14/17 11:17  120      


 


11/14/17 11:17 97.9 120 20 95/68 (77) 99   


 


11/14/17 11:16     99 Nasal Cannula 2.00 








Labs:





Laboratory Tests








Test


  11/15/17


04:37


 


White Blood Count


  3.4 TH/MM3


(4.0-11.0)


 


Red Blood Count


  2.64 MIL/MM3


(4.00-5.30)


 


Hemoglobin


  8.9 GM/DL


(11.6-15.3)


 


Hematocrit


  26.5 %


(35.0-46.0)


 


Mean Corpuscular Volume


  100.6 FL


(80.0-100.0)


 


Mean Corpuscular Hemoglobin


  33.9 PG


(27.0-34.0)


 


Mean Corpuscular Hemoglobin


Concent 33.7 %


(32.0-36.0)


 


Red Cell Distribution Width


  20.9 %


(11.6-17.2)


 


Platelet Count


  48 TH/MM3


(150-450)


 


Mean Platelet Volume


  9.5 FL


(7.0-11.0)


 


Neutrophils (%) (Auto)


  86.6 %


(16.0-70.0)


 


Lymphocytes (%) (Auto)


  8.0 %


(9.0-44.0)


 


Monocytes (%) (Auto)


  5.3 %


(0.0-8.0)


 


Eosinophils (%) (Auto)


  0.0 %


(0.0-4.0)


 


Basophils (%) (Auto)


  0.1 %


(0.0-2.0)


 


Neutrophils # (Auto)


  2.9 TH/MM3


(1.8-7.7)


 


Lymphocytes # (Auto)


  0.3 TH/MM3


(1.0-4.8)


 


Monocytes # (Auto)


  0.2 TH/MM3


(0-0.9)


 


Eosinophils # (Auto)


  0.0 TH/MM3


(0-0.4)


 


Basophils # (Auto)


  0.0 TH/MM3


(0-0.2)


 


CBC Comment AUTO DIFF 


 


Differential Comment


  AUTO DIFF


CONFIRMED


 


Platelet Estimate LOW (NORMAL) 


 


Platelet Morphology Comment


  NORMAL


(NORMAL)


 


Blood Urea Nitrogen 8 MG/DL (7-18) 


 


Creatinine


  0.35 MG/DL


(0.50-1.00)


 


Random Glucose


  138 MG/DL


()


 


Total Protein


  4.9 GM/DL


(6.4-8.2)


 


Albumin


  2.4 GM/DL


(3.4-5.0)


 


Calcium Level


  8.1 MG/DL


(8.5-10.1)


 


Phosphorus Level


  1.8 MG/DL


(2.5-4.9)


 


Magnesium Level


  2.1 MG/DL


(1.5-2.5)


 


Alkaline Phosphatase


  162 U/L


()


 


Aspartate Amino Transf


(AST/SGOT) 24 U/L (15-37) 


 


 


Alanine Aminotransferase


(ALT/SGPT) 29 U/L (10-53) 


 


 


Total Bilirubin


  0.8 MG/DL


(0.2-1.0)


 


Sodium Level


  139 MEQ/L


(136-145)


 


Potassium Level


  3.1 MEQ/L


(3.5-5.1)


 


Chloride Level


  102 MEQ/L


()


 


Carbon Dioxide Level


  29.5 MEQ/L


(21.0-32.0)


 


Anion Gap 8 MEQ/L (5-15) 


 


Estimat Glomerular Filtration


Rate 193 ML/MIN


(>89)


 


Free Thyroxine


  0.98 NG/DL


(0.76-1.46)


 


Thyroid Stimulating Hormone


3rd Gen 1.590 uIU/ML


(0.358-3.740)








Result Diagram:  


11/15/17 0437                                                                  

              11/15/17 0437





Telemetry:


NSR





(1) COPD (chronic obstructive pulmonary disease)


Plan:  on nebs, steroids  


I





(2) Tobacco abuse


Plan:  smoking cessation





(3) Physical deconditioning


Plan:  pt 





(4) Pleural effusion on left


Plan:  s/p left thoracoscopic exploration for loculated pleural effusion 


path negative for malignant cells 





cultures neg 





OOB PT/OT 





eval for rehab


chest tubes removed 


allow pt to ambulate more today





s/p Bronch 11/13





cxr 11/14: 1. Increasing atelectasis/consolidation of the left lung.


2. Left chest tube and central venous catheter in good position.





 














(5) Pancytopenia


Plan:  appreciate hematology input 





--Pancytopenia with macrocytosis and normal B12.  


--suspect myelodysplastic syndrome until proven otherwise.


--will need bone marrow biopsy and aspirate. (declining at this time)


--iron studies are consistent with anemia of chronic disease 








--blood transfusion if the hemoglobin is less than 8 and platelet transfusion 

if the platelet count is less than 15 or any bleeding / per Heme 








Problem Qualifiers





(1) COPD (chronic obstructive pulmonary disease):  


Qualified Codes:  J43.1 - Panlobular emphysema








Terwilliger,Jacqueline R. ARNP Nov 15, 2017 09:58

## 2017-11-15 NOTE — PD.ONC.PN
Subjective


Subjective


Remarks


Afebrile


Had chest tubes removed this am


Has just worked with physical therapy and is quite SOB


Continues to decline bone marrow biopsy


" They wanted me to do one of those 5 years ago"





Objective


Data











  Date Time  Temp Pulse Resp B/P (MAP) Pulse Ox O2 Delivery O2 Flow Rate FiO2


 


11/15/17 10:00  110      


 


11/15/17 09:00  120      


 


11/15/17 08:00  96      


 


11/15/17 07:27     96 Nasal Cannula 2.00 


 


11/15/17 07:00     98 Nasal Cannula 2.00 


 


11/15/17 07:00 97.8 108 20 105/56 (72) 98   


 


11/15/17 07:00  108      


 


11/15/17 06:00  89      


 


11/15/17 05:00  87      


 


11/15/17 04:00  103      


 


11/15/17 03:45     96 Nasal Cannula 2.00 


 


11/15/17 03:45 97.7 106 18 114/67 (83) 96   


 


11/15/17 03:00  80      


 


11/15/17 02:00  87      


 


11/15/17 01:00  85      


 


11/15/17 00:00  93      


 


11/14/17 23:20     97 Nasal Cannula 2.00 


 


11/14/17 23:20 97.7 98 16 108/58 (75) 97   


 


11/14/17 23:00  87      


 


11/14/17 22:00  100      


 


11/14/17 21:00  110      


 


11/14/17 20:30     98 Nasal Cannula 2.00 


 


11/14/17 20:30 97.9 104 18 108/69 (82) 98   


 


11/14/17 20:00  96      


 


11/14/17 19:56     98 Nasal Cannula 2.00 


 


11/14/17 19:00  95      


 


11/14/17 18:05  101      


 


11/14/17 17:36   17     


 


11/14/17 17:00  94      


 


11/14/17 15:00  115      


 


11/14/17 15:00     99 Nasal Cannula 2.00 


 


11/14/17 15:00 98.1 109 17 104/65 (78) 99   


 


11/14/17 11:17  120      


 


11/14/17 11:17 97.9 120 20 95/68 (77) 99   


 


11/14/17 11:16     99 Nasal Cannula 2.00 














 11/15/17 11/15/17 11/15/17





 07:00 15:00 23:00


 


Intake Total 300 ml  


 


Output Total 60 ml 800 ml 


 


Balance 240 ml -800 ml 








Result Diagram:  


11/15/17 0437                                                                  

              11/15/17 0437





Laboratory Results





Laboratory Tests








Test


  11/15/17


04:37


 


White Blood Count 3.4 TH/MM3 


 


Red Blood Count 2.64 MIL/MM3 


 


Hemoglobin 8.9 GM/DL 


 


Hematocrit 26.5 % 


 


Mean Corpuscular Volume 100.6 FL 


 


Mean Corpuscular Hemoglobin 33.9 PG 


 


Mean Corpuscular Hemoglobin


Concent 33.7 % 


 


 


Red Cell Distribution Width 20.9 % 


 


Platelet Count 48 TH/MM3 


 


Mean Platelet Volume 9.5 FL 


 


Neutrophils (%) (Auto) 86.6 % 


 


Lymphocytes (%) (Auto) 8.0 % 


 


Monocytes (%) (Auto) 5.3 % 


 


Eosinophils (%) (Auto) 0.0 % 


 


Basophils (%) (Auto) 0.1 % 


 


Neutrophils # (Auto) 2.9 TH/MM3 


 


Lymphocytes # (Auto) 0.3 TH/MM3 


 


Monocytes # (Auto) 0.2 TH/MM3 


 


Eosinophils # (Auto) 0.0 TH/MM3 


 


Basophils # (Auto) 0.0 TH/MM3 


 


CBC Comment AUTO DIFF 


 


Differential Comment


  AUTO DIFF


CONFIRMED


 


Platelet Estimate LOW 


 


Platelet Morphology Comment NORMAL 


 


Blood Urea Nitrogen 8 MG/DL 


 


Creatinine 0.35 MG/DL 


 


Random Glucose 138 MG/DL 


 


Total Protein 4.9 GM/DL 


 


Albumin 2.4 GM/DL 


 


Calcium Level 8.1 MG/DL 


 


Phosphorus Level 1.8 MG/DL 


 


Magnesium Level 2.1 MG/DL 


 


Alkaline Phosphatase 162 U/L 


 


Aspartate Amino Transf


(AST/SGOT) 24 U/L 


 


 


Alanine Aminotransferase


(ALT/SGPT) 29 U/L 


 


 


Total Bilirubin 0.8 MG/DL 


 


Sodium Level 139 MEQ/L 


 


Potassium Level 3.1 MEQ/L 


 


Chloride Level 102 MEQ/L 


 


Carbon Dioxide Level 29.5 MEQ/L 


 


Anion Gap 8 MEQ/L 


 


Estimat Glomerular Filtration


Rate 193 ML/MIN 


 


 


Free Thyroxine 0.98 NG/DL 


 


Thyroid Stimulating Hormone


3rd Gen 1.590 uIU/ML 


 








Culture Results





Microbiology








 Date/Time


Source Procedure


Growth Status


 


 


 11/12/17 11:15


Sputum Endotracheal Gram Stain - Final Complete


 


 11/12/17 11:15


Sputum Endotracheal Sputum Culture - Final


RARE GROWTH NORMAL RESPIRATORY JULIAN Complete











Administered Medications








 Medications


  (Trade)  Dose


 Ordered  Sig/Emre


 Route


 PRN Reason  Start Time


 Stop Time Status Last Admin


Dose Admin


 


 Sennosides


  (Senokot)  17.2 mg  Q12H  PRN


 PO


 Moderate constipation  10/26/17 11:30


    11/14/17 02:19


 


 


 Nifedipine


  (Procardia Xl)  30 mg  DAILY


 PO


   11/3/17 09:00


   Future Hold 11/14/17 08:32


 


 


 Furosemide


  (Lasix)  20 mg  DAILY


 PO


   11/2/17 17:35


   Future Hold 11/12/17 09:21


 


 


 Lactulose


  (Lactulose Liq)  30 ml  QID  PRN


 PO


 severe constipation   11/7/17 13:00


    11/8/17 08:42


 


 


 Albuterol Sulfate


  (Albuterol Neb)  2.5 mg  Q2HR NEB  PRN


 NEB


 WHEEZING  11/8/17 19:30


    11/12/17 10:55


 


 


 IV Flush


  (NS Flush)  2 ml  BID


 IV FLUSH


   11/8/17 21:00


    11/15/17 09:00


 


 


 Pantoprazole


 Sodium


  (Protonix)  40 mg  HS


 PO


   11/8/17 21:00


    11/14/17 21:38


 


 


 Ondansetron HCl


  (Zofran Inj)  4 mg  Q6H  PRN


 IV PUSH


 NAUSEA OR VOMITING  11/8/17 19:30


    11/12/17 05:58


 


 


 Sodium Chloride  1,000 ml @ 


 30 mls/hr  Q24H


 IV


   11/8/17 21:00


    11/11/17 21:00


 


 


 Polyethylene


 Glycol


  (Miralax)  17 gm  DAILY


 PO


   11/9/17 09:00


    11/15/17 09:00


 


 


 Potassium Chloride


  (KCl)  10 meq  DAILY


 PO


   11/9/17 09:00


    11/15/17 09:00


 


 


 Ceftriaxone


 Sodium 1000 mg/


 Sodium Chloride  100 ml @ 


 200 mls/hr  Q24H


 IV


   11/9/17 23:00


    11/14/17 22:13


 


 


 Chlorhexidine


 Gluconate


  (Peridex 0.12%


 Liq)  15 ml  BID@08,20


 MT


   11/12/17 20:00


    11/12/17 21:32


 


 


 Methylprednisolone


 Sodium Succinate


  (SoluMEDROL INJ)  60 mg  Q8H


 IV PUSH


   11/12/17 12:00


    11/15/17 04:01


 


 


 Enoxaparin Sodium


  (Lovenox Inj)  40 mg  Q24H


 SQ


   11/12/17 12:00


   Future Hold 11/12/17 12:54


 


 


 Lactulose


  (Lactulose Liq)  30 ml  DAILY


 NG


   11/13/17 09:00


    11/15/17 09:00


 


 


 Docusate Sodium


  (Colace)  100 mg  BID


 PO


   11/13/17 09:00


    11/15/17 09:00


 


 


 Ferrous Sulfate


  (Ferrous Sulfate)  325 mg  BID


 PO


   11/13/17 21:00


    11/15/17 09:00


 


 


 Oxycodone/


 Acetaminophen


  (Percocet


 7.5-325 Mg)  1 tab  Q6H  PRN


 PO


 pain 5-10  11/13/17 18:15


    11/15/17 04:00


 








Objective Remarks


GENERAL: Middle aged female working with therapy


SKIN: Warm and dry.


HEAD: Normocephalic.


EYES: No injection or drainage. 


NECK: Supple, trachea midline. 


CARDIOVASCULAR: +S1/S2


RESPIRATORY: diminished at left lung base. anterior fields clear. 


GASTROINTESTINAL: Abdomen soft, non-tender, nondistended. 


EXTREMITIES: No cyanosis. No edema


NEUROLOGICAL: No obvious focal deficit. Awake, alert, and oriented x3.





Assessment/Plan


Problem List:  


(1) Pancytopenia


ICD Codes:  D61.818 - Other pancytopenia


Plan:  11/15: Hemoglobin improved today after transfusion yesterday. Patient 

reports she will consider bone marrow biopsy as an outpatient. Plan to have her 

follow-up in clinic once discharged. Check CBC in a.m.


--Pancytopenia with macrocytosis and normal B12.  


--suspect that she has myelodysplastic syndrome until proven otherwise.


--will need bone marrow biopsy and aspirate. (declining at this time)


--iron studies are consistent with anemia of chronic disease 


--blood transfusion if the hemoglobin is less than 8 and platelet transfusion 

if the platelet count is less than 15 or any bleeding .





Assessment


55y/o female with pancytopenia with macrocytosis.


h/o COPD


Attending Statement


The exam, history, and the medical decision-making described in the above note 

were completed with the assistance of the mid-level provider. I reviewed and 

agree with the findings presented.  I attest that I had a face-to-face 

encounter with the patient on the same day, and personally performed and 

documented my assessment and findings in the medical record.


No new c/o


wants CT out and go home.


still decline BM bx











Janiya Ramirez Nov 15, 2017 10:50


Velma Kapoor MD Nov 15, 2017 18:33

## 2017-11-15 NOTE — HHI.PR
Subjective


Remarks


S/P thoracotomy  and  decortication . Chest tube is  out . Hgb 8.9G


C XR  showed  worse  aeration on left. 


No fever.





Objective





Vital Signs








  Date Time  Temp Pulse Resp B/P (MAP) Pulse Ox O2 Delivery O2 Flow Rate FiO2


 


11/15/17 12:04   22     


 


11/15/17 12:00  96      


 


11/15/17 11:00     100 Nasal Cannula 2.00 


 


11/15/17 11:00  99      


 


11/15/17 11:00 97.5 99 22 111/65 (80) 100   


 


11/15/17 10:00  110      


 


11/15/17 09:00  120      


 


11/15/17 08:00  96      


 


11/15/17 07:27     96 Nasal Cannula 2.00 


 


11/15/17 07:00     98 Nasal Cannula 2.00 


 


11/15/17 07:00 97.8 108 20 105/56 (72) 98   


 


11/15/17 07:00  108      


 


11/15/17 06:00  89      


 


11/15/17 05:00  87      


 


11/15/17 04:00  103      


 


11/15/17 03:45     96 Nasal Cannula 2.00 


 


11/15/17 03:45 97.7 106 18 114/67 (83) 96   


 


11/15/17 03:00  80      


 


11/15/17 02:00  87      


 


11/15/17 01:00  85      


 


11/15/17 00:00  93      


 


11/14/17 23:20     97 Nasal Cannula 2.00 


 


11/14/17 23:20 97.7 98 16 108/58 (75) 97   


 


11/14/17 23:00  87      


 


11/14/17 22:00  100      


 


11/14/17 21:00  110      


 


11/14/17 20:30     98 Nasal Cannula 2.00 


 


11/14/17 20:30 97.9 104 18 108/69 (82) 98   


 


11/14/17 20:00  96      


 


11/14/17 19:56     98 Nasal Cannula 2.00 


 


11/14/17 19:00  95      


 


11/14/17 18:05  101      


 


11/14/17 17:00  94      


 


11/14/17 15:00  115      


 


11/14/17 15:00     99 Nasal Cannula 2.00 


 


11/14/17 15:00 98.1 109 17 104/65 (78) 99   














I/O      


 


 11/14/17 11/14/17 11/14/17 11/15/17 11/15/17 11/15/17





 07:00 15:00 23:00 07:00 15:00 23:00


 


Intake Total 420 ml 1545 ml 580 ml 300 ml  


 


Output Total 750 ml 1560 ml 80 ml 60 ml 800 ml 


 


Balance -330 ml -15 ml 500 ml 240 ml -800 ml 


 


      


 


Intake Oral 420 ml 625 ml 480 ml 300 ml  


 


IV Total  500 ml 100 ml   


 


Packed Cells  400 ml    


 


Blood Product IV Normal Saline Flush  20 ml    


 


Output Urine Total 750 ml 1500 ml  0 ml 800 ml 


 


Chest Tube Drainage Total  60 ml 80 ml 60 ml  


 


Bladder Scan Volume Amount    200 ml  





    200 ml  





    730 ml  





    730 ml  


 


# Voids   1   


 


# Bowel Movements 0 0  0  








Result Diagram:  


11/15/17 0437                                                                  

              11/15/17 0437





Objective Remarks


GENERAL:  This is an averagely built middle-aged white female who is alert


HEENT:  Head normocephalic.  Pupils are reactive and equal.  Tongue moist.


Throat is clear.  Nasal mucosa clear.


NECK:  Supple.  No bruits, thyroid enlargement or lymphadenopathy.


CHEST:  Distant breath sounds at the left  lung base and occ wheeze with   

crackles left  chest .


HEART:  The heart sounds are irregular, S1-S2.  No murmur.  No S3.


ABDOMEN:  Soft and nontender.  No organomegaly.  Bowel sounds are active.


EXTREMITIES:  Edema 1+ with diminished peripheral pulses. Bilateral Foot  

deformity.  NEUROLOGIC:


Reflexes are 1+ . No deficits


SKIN: Dry and scaly.





Assessment and Plan


Assessment and Plan





 


IMPRESSION


1.  COPD with acute exacerbation, resolved


2.  S/p decortication  left  Chest


3.  Atelectasis left lower lobe with mucus plugging.


4.  Hyponatremia.


5.  Abnormal liver enzymes


6.  Anemia of chronic disease.











Plan :








1. O2  2 L.





2. IS at bedside q3h





3. Taper  solumedrol to 40 mg bid





4. Duoneb   nebs tid.





5. CXR ,CBC in am.





6. Mucomyst 20 % nebs q6h.





7. Up with help





8. Anemia W/U and Bone  marrow  biopsy











NATA Grey MD Nov 15, 2017 12:14

## 2017-11-15 NOTE — RADRPT
EXAM DATE/TIME:  11/15/2017 11:08 

 

[facility] COMPARISON:     

CHEST SINGLE AP, November 14, 2017, 9:04.

 

                     

INDICATIONS :     

Chest tube removal.

                     

 

MEDICAL HISTORY :     

Chronic obstructive pulmonary disease.          

 

SURGICAL HISTORY :        

Thoracotomy

 

ENCOUNTER:     

Initial                                        

 

ACUITY:     

1 week      

 

PAIN SCORE:     

4/10

 

LOCATION:     

Bilateral chest 

 

FINDINGS:     

A single portable frontal view the chest shows interval removal of the left thoracostomy tubes. A sma
ll subpulmonic pneumothorax is seen. Volume loss involving the left hemithorax is unchanged. Bilatera
l pulmonary consolidations most pronounced within the left base are relatively stable. The heart is a
t the upper limits of normal in terms of size. A right-sided central line remains.

 

CONCLUSION:     

1. Small left subpulmonic pneumothorax.

2. Bilateral pulmonary infiltrates.

 

 

 

 Efrain Gomez Jr., MD on November 15, 2017 at 11:45           

Board Certified Radiologist.

 This report was verified electronically.

## 2017-11-16 NOTE — HHI.PR
Subjective


Remarks


S/P thoracotomy  and  decortication . Chest tube is  out . Hgb 8.9G. Feels  

better .


No fever.





Objective





Vital Signs








  Date Time  Temp Pulse Resp B/P (MAP) Pulse Ox O2 Delivery O2 Flow Rate FiO2


 


11/16/17 16:59 98.2 101 18 110/76 (87) 99   


 


11/16/17 14:25  83      


 


11/16/17 13:00  86      


 


11/16/17 12:14      Room Air  


 


11/16/17 12:00  86      


 


11/16/17 11:47 97.9 85 18 162/86 (111) 95   


 


11/16/17 11:00  86      


 


11/16/17 11:00  87      


 


11/16/17 10:00  100      


 


11/16/17 09:17      Room Air  


 


11/16/17 09:13 97.9 98 18 143/84 (103) 94   


 


11/16/17 09:00  96      


 


11/16/17 08:00  88      


 


11/16/17 07:00  96      


 


11/16/17 06:00  82      


 


11/16/17 05:37     95   


 


11/16/17 05:00  94      


 


11/16/17 04:00     95 Room Air  


 


11/16/17 03:00  90      


 


11/16/17 02:00  92      


 


11/16/17 01:00  104      


 


11/16/17 00:00 98.0 106 18 147/87 (107) 95   


 


11/16/17 00:00  94      


 


11/16/17 00:00     97 Room Air  


 


11/15/17 23:00  92      


 


11/15/17 22:00  84      


 


11/15/17 21:00  90      


 


11/15/17 20:00     97 Room Air  


 


11/15/17 20:00 98.3 93 18 128/74 (92) 95   


 


11/15/17 20:00  108      


 


11/15/17 18:41   19     


 


11/15/17 18:03  97      














I/O      


 


 11/15/17 11/15/17 11/15/17 11/16/17 11/16/17 11/16/17





 07:00 15:00 23:00 07:00 15:00 23:00


 


Intake Total 300 ml  500 ml 960 ml  


 


Output Total 60 ml 800 ml 200 ml 1650 ml  


 


Balance 240 ml -800 ml 300 ml -690 ml  


 


      


 


Intake Oral 300 ml  500 ml 960 ml  


 


Output Urine Total 0 ml 800 ml 200 ml 1650 ml  


 


Chest Tube Drainage Total 60 ml     


 


Bladder Scan Volume Amount 200 ml 375 ml  669 ml  





 200 ml   669 ml  





 730 ml   669 ml  





 730 ml     


 


# Bowel Movements 0  1   








Result Diagram:  


11/16/17 0520 11/16/17 0520





Objective Remarks


GENERAL:  This is an averagely built middle-aged white female who is alert


HEENT:  Head normocephalic.  Pupils are reactive and equal.  Tongue moist.


Throat is clear.  Nasal mucosa clear.


NECK:  Supple.  No bruits, thyroid enlargement or lymphadenopathy.


CHEST:  Distant breath sounds at the left  lung base and occ wheeze with  

crackles left  chest .


HEART:  The heart sounds are irregular, S1-S2.  No murmur.  No S3.


ABDOMEN:  Soft and nontender.  No organomegaly.  Bowel sounds are active.


EXTREMITIES:  Edema 1+ with diminished peripheral pulses. Bilateral Foot  

deformity.  NEUROLOGIC:


Reflexes are 1+ . No deficits


SKIN: Dry and scaly.





Assessment and Plan


Assessment and Plan





 


IMPRESSION


1.  COPD with acute exacerbation, resolved


2.  S/p decortication  left  Chest


3.  Atelectasis left lower lobe with mucus plugging.


4.  Hyponatremia.


5.  Abnormal liver enzymes


6.  Anemia of chronic disease.











Plan :








1. O2  2 L.





2. IS at bedside q3h





3. D/C  solumedrol 





4. Duoneb   nebs tid.





5. Prednisone 10  mg bid





6. D/C Mucomyst 





7. Up with help





8. Anemia W/U and Bone  marrow  biopsy











NATA Grey MD Nov 16, 2017 18:01

## 2017-11-16 NOTE — RADRPT
EXAM DATE/TIME:  11/16/2017 03:46 

 

[facility] COMPARISON:     

CHEST SINGLE AP, November 14, 2017, 9:04.  CHEST SINGLE AP, November 13, 2017, 4:58.  CHEST SINGLE AP
, November 15, 2017, 11:08.

 

                     

INDICATIONS :     

Post chest tube removal. 

                     

 

MEDICAL HISTORY :     

Chronic obstructive pulmonary disease.          

 

SURGICAL HISTORY :        

Thoracotomy.

 

ENCOUNTER:     

Subsequent                                        

 

ACUITY:     

1 week      

 

PAIN SCORE:     

6/10

 

LOCATION:     

Bilateral chest 

 

FINDINGS:     

A single view of the chest demonstrates some residual air in the left lower costophrenic angle. The a
pical pleural fluid is stable. Right lung is relatively clear. Mild diffuse interstitial prominence. 
Right IJ Vas-Cath in good position. The heart remains enlarged.  Osseous structures are intact.

 

CONCLUSION:     

Stable examination. Residual pleural effusion on the left and residual pleural air similar to the pre
vious study.  

 

 

 

 Ernesto Wyman MD on November 16, 2017 at 5:02           

Board Certified Radiologist.

 This report was verified electronically.

## 2017-11-16 NOTE — HHI.PR
Subjective


Remarks


The patient is a 56-year-old female with past medical history of COPD, who 

presented to [facility] ED with complaints of coughing, wheezing. 

The patient states that she had food poisoning last week where she had nausea 

and diarrhea, after she ate suspicious food. She denies any worsening of 

dyspnea from baseline. In addition, she denies any constitutional symptoms. She 

quit smoking five and a half years ago and used to smoke one to two packs per 

day for about 30 years.  Chest x-ray in the ER showed consolidation with large 

effusion and volume loss on the left.  On further history she denies any 

hemoptysis, however, she reports 20 pounds weight loss in the last 4 months and 

decreased p.o. intake.  She is being followed up by Dr. Grey her outpatient 

pulmonologist.  The patient denies any use of oxygen at home, however, she is 

on bronchodilators p.r.n. Her laboratory data is significant for hyponatremia 

with sodium level 125, lactic acid level 2.1.  On arrival to the ER she was 

tachycardic and tachypneic.  When seen the patient is on 4 liters nasal cannula 

with saturation ranges between %.  In the ER she was given Rocephin, 

azithromycin, Solu-Medrol, bronchodilator treatment and IV fluids.  Her current 

blood pressure is 107/61.





10/27 Patient s/p CT guided thoracentesis and CT placement by IR yesterday. She 

is feeling better. 





Subjective:


10/28 She complains of pain and requests increased pain meds. Repeat imaging 

ordered per pulmonology to evaluate mass vs loculated effusion. On NC. 





[facility] RECONSULT NOTE 11/12/17


Critical care consulted for severe hypoxemic respiratory failure.  On 11/8/17 

patient had left thoracotomy for decortication, evacuation of complex loculated 

effusion, adhesiolysis by Dr. Rodriguez.  CXR 11/11/17 showed near complete 

whiteout of the left lung-pulmonary Dr. Khalil is following.  Today a HALICAT 

was called as the patient was found profoundly hypoxemic in the 70s oxygen 

saturation.  She was placed on 100% percent nonrebreather with improvement in 

saturation only to 81%.  Stat ABG on 100% nonrebreather showed pH of 7.29 PCO2 

of 62 pO2 of 53 and oxygen saturation 79%.  Stat chest x-ray showed complete 

whiteout of the left lung.  I discussed with the patient briefly, she is 

agreeable for intubation.  I proceeded with endotracheal intubation and placed 

on mechanical ventilation -patient saturation gradually improved to 100%.  

Discussed with pulmonology Dr. Khalil.  He plans to do bronchoscopy today 

afternoon. 





SUBJ 11/13: Patient had bronchoscopy by Dr. Khalil yesterday.  Chest x-ray 

today shows improvement in aeration but still with volume loss left lower lung 

fields which is unchanged from the post thoracotomy chest x-rays.  Wide-awake 

on vent tolerating CPAP will proceed with weaning trials





11-14 TRANSFERRED BACK TO OUR SERVICE


EXTUBATED YESTERDAY


GETTING BLOOD 1 UNIT PRBC TODAY


PT AND OT


DW RN AND PT


LESS SOB THAN YESTERDAY








11-15 REFUSING BONE MARROW BIOPSY


NO BLOOD TRANSFUSION TODAY


NEED PT AND OT


BECOMING MOBILE


HAD CHEST TUBES OUT


AM LABS


HAD BM


DW RN AND PT





11-16 still having urinary retention


WILL NEED NAVARRETE CATHETER SINCE NOT URINATING ON HER OWN IF NOT URINATING AFTER 

LASIX


DW RN AND PT


LOTS OF DRAINAGE FROM LEFT CHEST WOUND AREA


NO SOB


HAD GOOD BM TODAY


DW RN AND PT





Objective


Vitals





Vital Signs








  Date Time  Temp Pulse Resp B/P (MAP) Pulse Ox O2 Delivery O2 Flow Rate FiO2


 


11/16/17 09:17      Room Air  


 


11/16/17 09:13 97.9 98 18 143/84 (103) 94   


 


11/16/17 06:00  82      


 


11/16/17 05:37     95   


 


11/16/17 05:00  94      


 


11/16/17 04:00     95 Room Air  


 


11/16/17 03:00  90      


 


11/16/17 02:00  92      


 


11/16/17 01:00  104      


 


11/16/17 00:00 98.0 106 18 147/87 (107) 95   


 


11/16/17 00:00  94      


 


11/16/17 00:00     97 Room Air  


 


11/15/17 23:00  92      


 


11/15/17 22:00  84      


 


11/15/17 21:00  90      


 


11/15/17 20:00     97 Room Air  


 


11/15/17 20:00 98.3 93 18 128/74 (92) 95   


 


11/15/17 20:00  108      


 


11/15/17 18:41   19     


 


11/15/17 18:03  97      


 


11/15/17 17:00  93      


 


11/15/17 15:00  79      


 


11/15/17 15:00 98.2 90 19 128/74 (92) 96   


 


11/15/17 15:00     96 Room Air  


 


11/15/17 14:00  89      


 


11/15/17 13:00  103      


 


11/15/17 12:15     96   21


 


11/15/17 12:00  96      


 


11/15/17 11:00     100 Nasal Cannula 2.00 


 


11/15/17 11:00  99      


 


11/15/17 11:00 97.5 99 22 111/65 (80) 100   


 


11/15/17 10:00  110      














I/O      


 


 11/15/17 11/15/17 11/15/17 11/16/17 11/16/17 11/16/17





 07:00 15:00 23:00 07:00 15:00 23:00


 


Intake Total 300 ml  500 ml 960 ml  


 


Output Total 60 ml 800 ml 200 ml 1650 ml  


 


Balance 240 ml -800 ml 300 ml -690 ml  


 


      


 


Intake Oral 300 ml  500 ml 960 ml  


 


Output Urine Total 0 ml 800 ml 200 ml 1650 ml  


 


Chest Tube Drainage Total 60 ml     


 


Bladder Scan Volume Amount 200 ml 375 ml  669 ml  





 200 ml   669 ml  





 730 ml   669 ml  





 730 ml     


 


# Bowel Movements 0  1   








Result Diagram:  


11/16/17 0520                                                                  

              11/16/17 0520





Other Results





 Laboratory Tests








Test


  11/14/17


04:18 11/15/17


04:37 11/15/17


14:00 11/16/17


05:20


 


White Blood Count 3.3 TH/MM3  3.4 TH/MM3   4.3 TH/MM3 


 


Red Blood Count 1.99 MIL/MM3  2.64 MIL/MM3   2.90 MIL/MM3 


 


Hemoglobin 7.0 GM/DL  8.9 GM/DL   10.1 GM/DL 


 


Hematocrit 20.5 %  26.5 %   29.3 % 


 


Mean Corpuscular Volume 103.4 FL  100.6 FL   101.0 FL 


 


Mean Corpuscular Hemoglobin 35.1 PG  33.9 PG   34.9 PG 


 


Mean Corpuscular Hemoglobin


Concent 33.9 % 


  33.7 % 


  


  34.6 % 


 


 


Red Cell Distribution Width 21.8 %  20.9 %   21.2 % 


 


Platelet Count 45 TH/MM3  48 TH/MM3   67 TH/MM3 


 


Mean Platelet Volume 9.4 FL  9.5 FL   9.7 FL 


 


Neutrophils (%) (Auto) 84.6 %  86.6 %   82.9 % 


 


Lymphocytes (%) (Auto) 10.5 %  8.0 %   9.4 % 


 


Monocytes (%) (Auto) 4.9 %  5.3 %   7.6 % 


 


Eosinophils (%) (Auto) 0.0 %  0.0 %   0.0 % 


 


Basophils (%) (Auto) 0.0 %  0.1 %   0.1 % 


 


Neutrophils # (Auto) 2.8 TH/MM3  2.9 TH/MM3   3.6 TH/MM3 


 


Lymphocytes # (Auto) 0.3 TH/MM3  0.3 TH/MM3   0.4 TH/MM3 


 


Monocytes # (Auto) 0.2 TH/MM3  0.2 TH/MM3   0.3 TH/MM3 


 


Eosinophils # (Auto) 0.0 TH/MM3  0.0 TH/MM3   0.0 TH/MM3 


 


Basophils # (Auto) 0.0 TH/MM3  0.0 TH/MM3   0.0 TH/MM3 


 


CBC Comment AUTO DIFF  AUTO DIFF   AUTO DIFF 


 


Differential Comment


  AUTO DIFF


CONFIRMED AUTO DIFF


CONFIRMED 


  AUTO DIFF


CONFIRMED


 


Platelet Estimate LOW  LOW   LOW 


 


Platelet Morphology Comment NORMAL  NORMAL   NORMAL 


 


Target Cells     


 


Blood Urea Nitrogen 9 MG/DL  8 MG/DL   10 MG/DL 


 


Creatinine 0.41 MG/DL  0.35 MG/DL   0.52 MG/DL 


 


Random Glucose 131 MG/DL  138 MG/DL   121 MG/DL 


 


Total Protein 4.2 GM/DL  4.9 GM/DL   5.2 GM/DL 


 


Albumin 1.8 GM/DL  2.4 GM/DL   2.6 GM/DL 


 


Calcium Level 7.7 MG/DL  8.1 MG/DL   8.1 MG/DL 


 


Phosphorus Level 1.9 MG/DL  1.8 MG/DL   2.2 MG/DL 


 


Magnesium Level 2.1 MG/DL  2.1 MG/DL   2.0 MG/DL 


 


Alkaline Phosphatase 123 U/L  162 U/L   174 U/L 


 


Aspartate Amino Transf


(AST/SGOT) 19 U/L 


  24 U/L 


  


  38 U/L 


 


 


Alanine Aminotransferase


(ALT/SGPT) 19 U/L 


  29 U/L 


  


  41 U/L 


 


 


Total Bilirubin 0.4 MG/DL  0.8 MG/DL   0.7 MG/DL 


 


Sodium Level 136 MEQ/L  139 MEQ/L   140 MEQ/L 


 


Potassium Level 3.6 MEQ/L  3.1 MEQ/L  3.9 MEQ/L  3.9 MEQ/L 


 


Chloride Level 102 MEQ/L  102 MEQ/L   102 MEQ/L 


 


Carbon Dioxide Level 28.1 MEQ/L  29.5 MEQ/L   30.9 MEQ/L 


 


Anion Gap 6 MEQ/L  8 MEQ/L   7 MEQ/L 


 


Estimat Glomerular Filtration


Rate 160 ML/MIN 


  193 ML/MIN 


  


  122 ML/MIN 


 


 


Lactic Acid Level 0.7 mmol/L    


 


Random Cortisol 11.9 MCG/DL    


 


Hemoglobin A1c  4.9 %   


 


Free Thyroxine  0.98 NG/DL   


 


Thyroid Stimulating Hormone


3rd Gen 


  1.590 uIU/ML 


  


  


 








Imaging





Last Impressions








Chest X-Ray 11/16/17 0600 Signed





Impressions: 





 Service Date/Time:  Thursday, November 16, 2017 03:46 - CONCLUSION:  Stable 





 examination. Residual pleural effusion on the left and residual pleural air 





 similar to the previous study.       Ernesto Wyman MD 


 


Abdomen X-Ray 11/12/17 1820 Signed





Impressions: 





 Service Date/Time:  Sunday, November 12, 2017 18:30 - CONCLUSION:  1. No 

obvious 





 obstruction or pneumoperitoneum on this limited view of the abdomen. 2. 





 Nasogastric tube with the tip projecting over the proximal gastric body. 3. 





 Volume loss in the left hemithorax with stable position of 2 thoracostomy 

tubes 





 and left basilar consolidation. .     Chris Kumari MD 


 


Chest CT 10/28/17 0000 Signed





Impressions: 





 Service Date/Time:  Saturday, October 28, 2017 14:44 - CONCLUSION:  1. 

Interval 





 placement of left-sided chest tube with slight decrease in the complex 





 low-density fluid collection in the lower left hemithorax. 2. Volume loss 





 remains in the left hemithorax with consolidation in the left infrahilar 

region 





 3. New small right pleural effusion and consolidation in the right posterior 





 lower lobe 4. The previously noted fluid and debris in the left mainstem 





 bronchus is no longer present.     Ibrahima Mccarthy MD 


 


Chest Ultrasound 10/27/17 0000 Signed





Impressions: 





 Service Date/Time:  Friday, October 27, 2017 09:59 - CONCLUSION:  No focal 





 collections of anechoic free fluid.  Prominent amount of heterogeneous 





 echotexture material surrounding the left lower chest.     Efrain Samuels MD 


 


Chest Tube Insertion 10/26/17 1224 Signed





Impressions: 





 Service Date/Time:  Thursday, October 26, 2017 13:22 - CONCLUSION: 

Uncomplicated 





 CT-guided thoracentesis.     Jorge Carrillo MD 








Objective Remarks


GENERAL: Awake alert oriented 3 --talkative and cooperative


SKIN: Warm and dry.  No obvious rashes


HEAD: Atraumatic. Normocephalic. 


EYES: Pupils equal and round. No scleral icterus. No injection or drainage.  

Extraocular muscles intact


ENT: No nasal bleeding or discharge.  Mucous membranes pink and moist.  Tongue 

is midline


NECK: Trachea midline. No JVD.  Supple


CARDIOVASCULAR: Regular rate and rhythm.  S1-S2 no S3 or S4 no heave or thrill 

or rub


RESPIRATORY: No accessory muscle use.  Breath sounds equal bilaterally.  Coarse 

breath sounds bilaterally


GASTROINTESTINAL: Abdomen soft, non-tender, nondistended. Hepatic and splenic 

margins not palpable. 


MUSCULOSKELETAL: Extremities without clubbing, cyanosis, or edema. No obvious 

deformities.  Bilateral lower extremity weakness


NEUROLOGICAL: Awake and alert. No obvious cranial nerve deficits.  Motor 

grossly within normal limits.  4 out of 5  muscle strength in the arms and 3 

out of 5 muscle strength in the legs.  Normal speech.


PSYCHIATRIC: Appropriate mood and affect; insight and judgment ABnormal.


Procedures


CT guided thoracentesis with L chest tube placement 10/26


Left thoracoscopic exploration, left thoracotomy for decortication, evacuation 

of complex loculated effusion, adhesiolysis 11/8/17 11-12 INTUBATED


11-13 EXTUBATED


11-13 BRONCHOSCOPY WITH BAL OF LEFT SIDE


Medications and IVs





Current Medications


Sodium Chloride (NS Flush) 2 ml UNSCH  PRN IVF FLUSH AFTER USING IV ACCESS Last 

administered on 11/5/17at 21:04;  Start 10/26/17 at 10:15;  Stop 11/7/17 at 17:

47;  Status DC


Methylprednisolone Sodium Succinate (SoluMEDROL INJ) 125 mg ONCE  ONCE IV PUSH  

Last administered on 10/26/17at 10:17;  Start 10/26/17 at 10:15;  Stop 10/26/17 

at 10:16;  Status DC


Albuterol/ Ipratropium (Duoneb Neb) 1 ampule ONCE  ONCE INH  Last administered 

on 10/26/17at 10:13;  Start 10/26/17 at 10:15;  Stop 10/26/17 at 10:16;  Status 

DC


Albuterol Sulfate (Albuterol Neb) 2.5 mg Q15M INH  Last administered on 10/26/

17at 10:13;  Start 10/26/17 at 10:15;  Stop 10/26/17 at 10:31;  Status DC


Sodium Chloride 1,000 ml @  125 mls/hr Q8H IV  Last administered on 10/26/17at 

10:17;  Start 10/26/17 at 10:15;  Stop 10/26/17 at 11:35;  Status DC


Ondansetron HCl (Zofran Inj) 4 mg ONCE  ONCE IV PUSH  Last administered on 10/26

/17at 10:41;  Start 10/26/17 at 10:45;  Stop 10/26/17 at 10:46;  Status DC


Ceftriaxone Sodium 1000 mg/ Sodium Chloride 100 ml @  200 mls/hr ONCE  ONCE IV  

Last administered on 10/26/17at 11:54;  Start 10/26/17 at 11:15;  Stop 10/26/17 

at 11:44;  Status DC


Azithromycin 500 mg/Sodium Chloride 250 ml @  250 mls/hr ONCE  ONCE IV  Last 

administered on 10/26/17at 12:31;  Start 10/26/17 at 11:15;  Stop 10/26/17 at 12

:14;  Status DC


Pantoprazole Sodium (Protonix Inj) 40 mg DAILY IV PUSH  Last administered on 10/

28/17at 08:54;  Start 10/26/17 at 12:00;  Stop 10/28/17 at 14:05;  Status DC


Albuterol/ Ipratropium (Duoneb Neb) 1 ampule Q4HR  NEB INH  Last administered 

on 10/30/17at 07:58;  Start 10/26/17 at 12:00;  Stop 10/30/17 at 11:59;  Status 

DC


Albuterol/ Ipratropium (Duoneb Neb) 1 ampule Q2HR NEB  PRN INH WHEEZING Last 

administered on 11/1/17at 10:14;  Start 10/26/17 at 11:30;  Stop 11/8/17 at 20:

29;  Status DC


Miscellaneous Information 1 [id number]  Last administered on 10/26/17at 21:22;  

Start 10/26/17 at 11:30;  Stop 11/4/17 at 23:35;  Status DC


Chlorhexidine Gluconate (Chlorhexidine 2% Cloth) Taper DAILY@04 TOP  Last 

administered on 10/29/17at 04:00;  Start 10/27/17 at 04:00;  Stop 11/4/17 at 23:

35;  Status DC


Chlorhexidine Gluconate (Chlorhexidine 2% Cloth) 3 pack UNSCH  PRN TOP HYGIENIC 

CARE;  Start 10/26/17 at 11:30;  Stop 11/4/17 at 23:35;  Status DC


Senna/Docusate Sodium (Jess-Colace) 1 tab BID PO  Last administered on 11/8/ 17at 08:44;  Start 10/26/17 at 21:00;  Stop 11/8/17 at 20:29;  Status DC


Magnesium Hydroxide (Milk Of Magnesia Liq) 30 ml Q12H  PRN PO Mild constipation 

Last administered on 11/6/17at 20:33;  Start 10/26/17 at 11:30;  Stop 11/8/17 

at 20:29;  Status DC


Sennosides (Senokot) 17.2 mg Q12H  PRN PO Moderate constipation Last 

administered on 11/14/17at 02:19;  Start 10/26/17 at 11:30


Bisacodyl (Dulcolax Supp) 10 mg DAILY  PRN RECTAL SEVERE CONSITIPATION;  Start 

10/26/17 at 11:30;  Stop 11/14/17 at 14:59;  Status DC


Lactulose (Lactulose Liq) 30 ml DAILY  PRN PO SEVERE CONSITIPATION;  Start 10/26

/17 at 11:30;  Status Cancel


Sodium Chloride 1,000 ml @  84 mls/hr [id number] IV  Last administered on 10/26/

17at 17:26;  Start 10/26/17 at 11:30;  Stop 10/27/17 at 08:37;  Status DC


Potassium Chloride 100 ml @  50 mls/hr Q2H  PRN IV For Potassium 2.8 - 3.2 mEq/L

;  Start 10/26/17 at 11:30;  Stop 10/28/17 at 14:05;  Status DC


Potassium Chloride 100 ml @  50 mls/hr Q2H  PRN IV For Potassium 2.8 - 3.2 mEq/L

;  Start 10/26/17 at 11:30;  Stop 10/28/17 at 14:05;  Status DC


Potassium Bicarb/ Potassium Chloride (K-Lyte Cl  Eff) 50 meq UNSCH  PRN PO For 

Potassium 3.3 - 3.5 mEq/L;  Start 10/26/17 at 11:30;  Stop 10/28/17 at 14:05;  

Status DC


Potassium Chloride 100 ml @  25 mls/hr UNSCH  PRN IV For Potassium 3.3 - 3.5 mEq

/L;  Start 10/26/17 at 11:30;  Stop 10/28/17 at 14:05;  Status DC


Potassium Chloride 100 ml @  50 mls/hr Q2H  PRN IV For Potassium 3.3 - 3.5 mEq/L

;  Start 10/26/17 at 11:30;  Stop 10/28/17 at 14:05;  Status DC


Magnesium Sulfate 4 gm/Sodium Chloride 100 ml @  50 mls/hr UNSCH  PRN IV For 

Magnesium 0.9 - 1.1 mg/dL;  Start 10/26/17 at 11:30;  Stop 10/28/17 at 14:05;  

Status DC


Magnesium Oxide (Mag-Ox) 800 mg UNSCH  PRN PO For Magnesium 1.2 - 1.6 mg/dL;  

Start 10/26/17 at 11:30;  Stop 10/28/17 at 14:05;  Status DC


Magnesium Sulfate 2 gm/Sodium Chloride 100 ml @  50 mls/hr UNSCH  PRN IV For 

Magnesium 1.2 - 1.6 mg/dL;  Start 10/26/17 at 11:30;  Stop 10/28/17 at 14:05;  

Status DC


Potassium Phosphate (K-Phos) 2,000 mg Q4H  PRN PO For Phosphorus < 2.5 mg/dL;  

Start 10/26/17 at 11:30;  Stop 10/28/17 at 14:05;  Status DC


Sodium Phosphate 30 mmol/Sodium Chloride 250 ml @  42 mls/hr UNSCH  PRN IV For 

Phosphorus < 2.5 mg/dL;  Start 10/26/17 at 11:30;  Stop 10/28/17 at 14:05;  

Status DC


Potassium Phosphate (K-Phos) 2,000 mg UNSCH  PRN PO/TUBE SEE LABEL COMMENTS;  

Start 10/26/17 at 11:30;  Stop 10/28/17 at 14:05;  Status DC


Potassium Phosphate 30 mmol/ Sodium Chloride 260 ml @  42 mls/hr UNSCH  PRN IV 

SEE LABEL COMMENTS Last administered on 10/27/17at 11:46;  Start 10/26/17 at 11:

30;  Stop 10/28/17 at 14:05;  Status DC


Dextrose (D50w (Vial) Inj) 50 ml UNSCH  PRN IV PUSH HYPOGLYCEMIA-SEE COMMENTS;  

Start 10/26/17 at 11:30;  Stop 11/4/17 at 23:34;  Status DC


Glucagon (Glucagon Inj) 1 mg UNSCH  PRN OTHER HYPOGLYCEMIA-SEE COMMENTS;  Start 

10/26/17 at 11:30;  Stop 11/4/17 at 23:34;  Status DC


Insulin Human Regular (NovoLIN R SUPPLEMENTAL SCALE) 1 Q6H SQ  Last 

administered on 10/29/17at 17:30;  Start 10/26/17 at 11:30;  Stop 11/4/17 at 23:

34;  Status DC


Ceftriaxone Sodium 1000 mg/ Sodium Chloride 100 ml @  200 mls/hr Q24H IV  Last 

administered on 10/31/17at 12:20;  Start 10/27/17 at 12:00;  Stop 11/1/17 at 11:

59;  Status DC


Azithromycin 500 mg/Sodium Chloride 250 ml @  250 mls/hr Q24H IV  Last 

administered on 10/28/17at 11:48;  Start 10/27/17 at 13:00;  Stop 10/28/17 at 15

:20;  Status DC


Methylprednisolone Sodium Succinate (SoluMEDROL INJ) 60 mg Q6HR IV PUSH  Last 

administered on 10/27/17at 12:31;  Start 10/26/17 at 16:00;  Stop 10/27/17 at 13

:03;  Status DC


Lidocaine/ Epinephrine (Xylocaine-Epi 1%-1:100,000 Inj) 20 ml STK-MED ONCE 

.ROUTE  Last administered on 10/26/17at 12:38;  Start 10/26/17 at 12:38;  Stop 

10/26/17 at 12:39;  Status DC


Fentanyl Citrate (fentaNYL INJ) 100 mcg STK-MED ONCE .ROUTE  Last administered 

on 10/26/17at 12:47;  Start 10/26/17 at 12:47;  Stop 10/26/17 at 12:48;  Status 

DC


Midazolam HCl (Versed Inj) 2 mg STK-MED ONCE .ROUTE  Last administered on 10/26/

17at 12:47;  Start 10/26/17 at 12:47;  Stop 10/26/17 at 12:48;  Status DC


Iohexol (Omnipaque 350 Inj) 69 ml STK-MED ONCE IVCONTRAST  Last administered on 

10/26/17at 13:33;  Start 10/26/17 at 13:33;  Stop 10/26/17 at 13:34;  Status DC


Ephedrine Sulfate (ePHEDrine/NS 25 MG/5 ML SYR) 25 mg STK-MED ONCE .ROUTE ;  

Start 10/26/17 at 13:40;  Stop 10/26/17 at 13:41;  Status DC


Acetaminophen (Tylenol) 650 mg Q6H  PRN PO PAIN Last administered on 10/27/17at 

04:13;  Start 10/27/17 at 04:15;  Stop 10/30/17 at 11:16;  Status DC


Oxycodone/ Acetaminophen (Percocet  5-325 Mg) 1 tab ONCE  ONCE PO  Last 

administered on 10/27/17at 09:39;  Start 10/27/17 at 09:30;  Stop 10/27/17 at 09

:31;  Status DC


Methylprednisolone Sodium Succinate (SoluMEDROL INJ) 40 mg Q6HR IV PUSH  Last 

administered on 10/29/17at 05:12;  Start 10/27/17 at 18:00;  Stop 10/29/17 at 11

:40;  Status DC


Morphine Sulfate (Morphine Inj) 1 mg ONCE  ONCE IV PUSH  Last administered on 10

/27/17at 15:06;  Start 10/27/17 at 15:00;  Stop 10/27/17 at 15:01;  Status DC


Acetaminophen (Tylenol) 650 mg Q6H  PRN PO PAIN 1-2;  Start 10/27/17 at 22:30


Morphine Sulfate (Morphine Inj) 1 mg Q4H  PRN IV PAIN 5-10 Last administered on 

10/28/17at 11:12;  Start 10/27/17 at 22:30;  Stop 10/28/17 at 13:46;  Status DC


Morphine Sulfate (Morphine Inj) 2 mg Q4H  PRN IV BREAKTHROUGH PAIN 6-10 Last 

administered on 11/8/17at 13:33;  Start 10/28/17 at 14:30;  Stop 11/8/17 at 19:

27;  Status DC


Acetaminophen/ Hydrocodone Bitart (Norco  5-325 Mg) 1 tab Q6H  PRN PO PAIN 3-5 

Last administered on 10/30/17at 05:18;  Start 10/28/17 at 13:45;  Stop 11/6/17 

at 09:01;  Status DC


Acetaminophen/ Hydrocodone Bitart (Norco  5-325 Mg) 2 tab Q6H  PRN PO PAIN 6-10 

Last administered on 11/6/17at 08:39;  Start 10/28/17 at 13:45;  Stop 11/6/17 

at 09:01;  Status DC


Pantoprazole Sodium (Protonix) 40 mg DAILY PO  Last administered on 11/8/17at 08

:41;  Start 10/29/17 at 09:00;  Stop 11/8/17 at 20:29;  Status DC


Iohexol (Omnipaque 350 Inj) 70 ml STK-MED ONCE IVCONTRAST  Last administered on 

10/28/17at 14:59;  Start 10/28/17 at 14:59;  Stop 10/28/17 at 15:00;  Status DC


Azithromycin 500 mg/Sodium Chloride 250 ml @  250 mls/hr Q24H IV  Last 

administered on 11/1/17at 11:57;  Start 10/30/17 at 13:00;  Stop 11/1/17 at 14:

46;  Status DC


Methylprednisolone Sodium Succinate (SoluMEDROL INJ) 40 mg Q12HR IV PUSH  Last 

administered on 10/31/17at 07:37;  Start 10/29/17 at 21:00;  Stop 10/31/17 at 13

:51;  Status DC


Methylprednisolone Sodium Succinate (SoluMEDROL INJ) 40 mg DAILY IV PUSH  Last 

administered on 11/2/17at 08:15;  Start 11/1/17 at 09:00;  Stop 11/2/17 at 18:19

;  Status DC


Nifedipine (Procardia Xl) 60 mg ONCE  ONCE PO  Last administered on 11/2/17at 17

:49;  Start 11/2/17 at 17:30;  Stop 11/2/17 at 17:32;  Status DC


Nifedipine (Procardia Xl) 30 mg DAILY PO  Last administered on 11/14/17at 08:32

;  Start 11/3/17 at 09:00;  Status Future Hold


Furosemide (Lasix) 20 mg DAILY PO  Last administered on 11/12/17at 09:21;  

Start 11/2/17 at 17:35;  Status Future Hold


Prednisone (Deltasone) 10 mg DAILY PO  Last administered on 11/12/17at 09:21;  

Start 11/5/17 at 09:00;  Stop 11/14/17 at 14:59;  Status DC


Acetaminophen/ Hydrocodone Bitart (Norco  5-325 Mg) 1 tab Q4H  PRN PO PAIN 3-5;

  Start 11/6/17 at 09:00;  Stop 11/8/17 at 19:27;  Status DC


Acetaminophen/ Hydrocodone Bitart (Norco  5-325 Mg) 2 tab Q4H  PRN PO PAIN 6-10 

Last administered on 11/8/17at 08:47;  Start 11/6/17 at 09:15;  Stop 11/8/17 at 

19:27;  Status DC


Lactulose (Lactulose Liq) 30 ml ONCE  ONCE PO ;  Start 11/7/17 at 13:00;  Stop 

11/7/17 at 13:19;  Status DC


Lactulose (Lactulose Liq) 30 ml QID  PRN PO severe constipation  Last 

administered on 11/8/17at 08:42;  Start 11/7/17 at 13:00


Simethicone (Phazyme Chew) 125 mg ONCE  ONCE PO  Last administered on 11/7/17at 

14:59;  Start 11/7/17 at 13:30;  Stop 11/7/17 at 13:31;  Status DC


Sodium Chloride (NS Flush) 2 ml BID IV FLUSH  Last administered on 11/8/17at 09:

00;  Start 11/7/17 at 21:00;  Stop 11/8/17 at 20:29;  Status DC


Sodium Chloride (NS Flush) 2 ml UNSCH  PRN IV FLUSH FLUSH AFTER USING IV ACCESS

;  Start 11/7/17 at 16:15;  Stop 11/8/17 at 20:29;  Status DC


Cefazolin Sodium 500 mg/Sodium Chloride 505 ml @ 0 mls/hr ON  CALL IRRIGATION ;

  Start 11/7/17 at 16:15;  Stop 11/14/17 at 14:59;  Status DC


Cefazolin Sodium/ Dextrose 50 ml @  150 mls/hr ON  CALL IV  Last administered 

on 11/8/17at 16:17;  Start 11/7/17 at 16:15;  Stop 11/14/17 at 14:59;  Status DC


Chlorhexidine Gluconate (Hibiclens 4% Top Soln) 1 applic ON  CALL TOPICAL  Last 

administered on 11/8/17at 08:42;  Start 11/7/17 at 16:15;  Stop 11/9/17 at 08:40

;  Status DC


Dextrose (D50w (Vial) Inj) 50 ml UNSCH  PRN IV PUSH HYPOGLYCEMIA-SEE COMMENTS;  

Start 11/7/17 at 16:15


Lactated Ringer's 1,000 ml @  30 mls/hr Q24H PRN IV SEE LABEL COMMENTS Last 

administered on 11/8/17at 14:53;  Start 11/7/17 at 18:30;  Stop 11/8/17 at 20:23

;  Status DC


Sodium Chloride 500 ml @  30 mls/hr [id number] PRN IV SEE LABEL COMMENTS;  Start 11 /7/17 at 18:30;  Stop 11/8/17 at 20:23;  Status DC


Metoprolol Tartrate (Lopressor) 25 mg ON CALL  PRN PO SEE LABEL COMMENTS;  

Start 11/7/17 at 18:30;  Stop 11/8/17 at 20:23;  Status DC


Povidone Iodine (Betadine 5% Antisepsis Kit) 1 applic ON CALL  PRN EACH NARE 

SEE LABEL COMMENTS;  Start 11/7/17 at 18:30;  Stop 11/8/17 at 20:23;  Status DC


Chlorhexidine Gluconate (Chlorhexidine 2% Cloth) 3 pack ON CALL  PRN TOPICAL 

SEE LABEL COMMENTS;  Start 11/7/17 at 18:30;  Stop 11/9/17 at 08:40;  Status DC


Insulin Human Regular (NovoLIN R INJ) See Protocol Table ... ON CALL  PRN SQ 

SEE PROTOCOL TABLE;  Start 11/7/17 at 18:30;  Stop 11/8/17 at 20:23;  Status DC


Albuterol/ Ipratropium (Duoneb Neb) 1 ampule Q6HR  NEB NEB  Last administered 

on 11/8/17at 09:40;  Start 11/7/17 at 22:00;  Stop 11/8/17 at 20:29;  Status DC


Bupivacaine HCl (Marcaine Pf 0.5% Inj) 30 ml ONCE  ONCE INFIL  Last 

administered on 11/8/17at 17:51;  Start 11/8/17 at 17:51;  Stop 11/8/17 at 17:53

;  Status DC


Albuterol Sulfate (Albuterol Neb) 2.5 mg Q6HR  NEB NEB  Last administered on 11/

10/17at 07:08;  Start 11/8/17 at 22:00;  Stop 11/10/17 at 12:20;  Status DC


Albuterol Sulfate (Albuterol Neb) 2.5 mg Q2HR NEB  PRN NEB WHEEZING Last 

administered on 11/16/17at 05:35;  Start 11/8/17 at 19:30


IV Flush (NS Flush) 2 ml BID IV FLUSH  Last administered on 11/16/17at 09:29;  

Start 11/8/17 at 21:00


IV Flush (NS Flush) 2 ml UNSCH  PRN IV FLUSH FLUSH AFTER USING IV ACCESS;  

Start 11/8/17 at 19:30


Miscellaneous Information (Post-op Orders (for Pharmacy))  STAT  ONCE OTHER ;  

Start 11/8/17 at 19:30;  Stop 11/8/17 at 20:27;  Status DC


Pantoprazole Sodium (Protonix) 40 mg HS PO  Last administered on 11/15/17at 22:

06;  Start 11/8/17 at 21:00


Ondansetron HCl (Zofran Inj) 4 mg Q6H  PRN IV PUSH NAUSEA OR VOMITING Last 

administered on 11/12/17at 05:58;  Start 11/8/17 at 19:30


Docusate Calcium (Surfak) 240 mg HS PO  Last administered on 11/8/17at 23:35;  

Start 11/8/17 at 21:00;  Stop 11/9/17 at 08:40;  Status DC


Magnesium Hydroxide (Milk Of Magnesia Liq) 30 ml DAILY  PRN PO MILD CONSTIPATION

;  Start 11/8/17 at 19:30


Acetaminophen 100 ml @  400 mls/hr Q6H IV  Last administered on 11/9/17at 19:24

;  Start 11/8/17 at 21:00;  Stop 11/9/17 at 15:14;  Status DC


Naloxone HCl (Narcan Inj) 0.4 mg UNSCH  PRN IV PUSH RESPIRATORY RATE LESS THAN 

10;  Start 11/8/17 at 19:30;  Stop 11/13/17 at 18:00;  Status DC


Morphine Sulfate (Morphine 1 Mg/ ml PCA) 30 mg UNSCH IV  Last administered on 11 /12/17at 09:29;  Start 11/8/17 at 19:30;  Stop 11/13/17 at 18:00;  Status DC


PCA Dosage Infused (Pha) 1 Q8HR .XX  Last administered on 11/12/17at 06:00;  

Start 11/8/17 at 22:00;  Stop 11/13/17 at 18:00;  Status DC


Morphine Sulfate (*morphine INJ PERIprocedure ONLY) 8 mg STK-MED ONCE .ROUTE  

Last administered on 11/8/17at 20:10;  Start 11/8/17 at 20:08;  Stop 11/9/17 at 

08:40;  Status DC


Morphine Sulfate (*morphine INJ PERIprocedure ONLY) 8 mg STK-MED ONCE .ROUTE  

Last administered on 11/8/17at 20:30;  Start 11/8/17 at 20:22;  Stop 11/9/17 at 

08:40;  Status DC


Meperidine HCl (*DEMEROL INJ PERIprocedural ONLY) 25 mg STK-MED ONCE .ROUTE  

Last administered on 11/8/17at 20:23;  Start 11/8/17 at 20:22;  Stop 11/9/17 at 

08:40;  Status DC


Morphine Sulfate (Morphine 1 Mg/ ml PCA) 30 mg STK-MED ONCE .ROUTE ;  Start 11/8 /17 at 20:25;  Stop 11/8/17 at 20:26;  Status DC


Sodium Chloride 1,000 ml @  30 mls/hr Q24H IV  Last administered on 11/11/17at 

21:00;  Start 11/8/17 at 21:00


Miscellaneous Information ALL NURSING DEPARTME... UNSCH  PRN .XX SEE LABEL 

COMMENTS;  Start 11/8/17 at 19:55;  Stop 11/9/17 at 19:54;  Status DC


Polyethylene Glycol (Miralax) 17 gm DAILY PO  Last administered on 11/16/17at 09

:29;  Start 11/9/17 at 09:00


Potassium Chloride (KCl) 30 meq ONCE  ONCE PO  Last administered on 11/9/17at 10

:07;  Start 11/9/17 at 08:45;  Stop 11/9/17 at 08:46;  Status DC


Potassium Chloride (KCl) 10 meq DAILY PO  Last administered on 11/15/17at 09:00

;  Start 11/9/17 at 09:00;  Stop 11/16/17 at 08:51;  Status DC


Cefazolin Sodium 1000 mg/Sodium Chloride 100 ml @  200 mls/hr Q8H IV ;  Start 11 /9/17 at 15:45;  Stop 11/9/17 at 15:45;  Status DC


Cefazolin Sodium 1000 mg/Sodium Chloride 100 ml @  200 mls/hr Q8H IV  Last 

administered on 11/10/17at 08:04;  Start 11/9/17 at 16:00;  Stop 11/10/17 at 08:

19;  Status DC


Ceftriaxone Sodium 1000 mg/ Sodium Chloride 100 ml @  200 mls/hr Q24H IV  Last 

administered on 11/16/17at 00:26;  Start 11/9/17 at 23:00


Furosemide (Lasix Inj) 20 mg ONCE  ONCE IV PUSH  Last administered on 11/10/

17at 13:08;  Start 11/10/17 at 14:00;  Stop 11/10/17 at 14:01;  Status DC


Potassium Chloride (KCl) 20 meq ONCE  ONCE PO  Last administered on 11/10/17at 

13:07;  Start 11/10/17 at 14:00;  Stop 11/10/17 at 14:01;  Status DC


Albuterol Sulfate (Albuterol Neb) 2.5 mg Q6HR WHILE AWAKE  NEB INH  Last 

administered on 11/12/17at 09:42;  Start 11/10/17 at 14:00;  Stop 11/12/17 at 10

:45;  Status DC


Furosemide (Lasix Inj) 20 mg ONCE  ONCE IV PUSH  Last administered on 11/10/

17at 18:49;  Start 11/10/17 at 17:00;  Stop 11/10/17 at 17:01;  Status DC


Potassium Chloride 100 ml @  50 mls/hr Q2H IV  Last administered on 11/11/17at 

11:26;  Start 11/11/17 at 08:00;  Stop 11/11/17 at 11:59;  Status DC


Etomidate (Amidate Inj) 40 mg STK-MED ONCE .ROUTE  Last administered on 11/12/ 17at 10:58;  Start 11/12/17 at 10:08;  Stop 11/12/17 at 10:09;  Status DC


Midazolam HCl (Versed Inj) 5 mg STK-MED ONCE .ROUTE  Last administered on 11/12/ 17at 10:57;  Start 11/12/17 at 10:09;  Stop 11/12/17 at 10:10;  Status DC


Albuterol Sulfate (Albuterol Neb) 2.5 mg Q4HR NEB  PRN NEB DYSPNEA;  Start 11/12 /17 at 10:30


Propofol 50 ml @ As Directed STK-MED ONCE .ROUTE  Last administered on 11/12/ 17at 10:59;  Start 11/12/17 at 10:18;  Stop 11/12/17 at 10:19;  Status DC


Chlorhexidine Gluconate (Peridex 0.12% Liq) 15 ml BID@08,20 MT  Last 

administered on 11/12/17at 21:32;  Start 11/12/17 at 20:00


Propofol 100 ml @  2.034 mls/ hr TITRATE  PRN IV SEDATION Last administered on 

11/13/17at 05:49;  Start 11/12/17 at 10:30;  Stop 11/13/17 at 18:00;  Status DC


Fentanyl Citrate 250 ml @ 5 mls/hr TITRATE  PRN IV SEDATION Last administered 

on 11/12/17at 18:25;  Start 11/12/17 at 10:30;  Stop 11/13/17 at 18:00;  Status 

DC


Albuterol/ Ipratropium (Duoneb Neb) 1 ampule Q4HR  NEB NEB  Last administered 

on 11/15/17at 07:22;  Start 11/12/17 at 12:00;  Stop 11/15/17 at 10:28;  Status 

DC


Methylprednisolone Sodium Succinate (SoluMEDROL INJ) 60 mg Q12H IV PUSH ;  

Start 11/12/17 at 12:00;  Stop 11/12/17 at 12:00;  Status DC


Methylprednisolone Sodium Succinate (SoluMEDROL INJ) 60 mg Q8H IV PUSH  Last 

administered on 11/15/17at 11:01;  Start 11/12/17 at 12:00;  Stop 11/15/17 at 12

:11;  Status DC


Enoxaparin Sodium (Lovenox Inj) 40 mg Q24H SQ  Last administered on 11/12/17at 

12:54;  Start 11/12/17 at 12:00;  Status Future Hold


Ferrous Sulfate (Ferrous Sulfate Liq) 300 mg BID PO  Last administered on 11/13/ 17at 09:05;  Start 11/12/17 at 14:30;  Stop 11/13/17 at 17:41;  Status DC


Acetylcysteine (Mucomyst 20% Neb) 2 ml Q6HR  NEB NEB ;  Start 11/12/17 at 16:00

;  Stop 11/12/17 at 17:41;  Status DC


Non-Formulary Medication 2 ML NEB EVERY 6 HOURS Q6HR  NEB NEB  Last 

administered on 11/14/17at 04:25;  Start 11/12/17 at 18:00;  Stop 11/15/17 at 10

:41;  Status DC


Potassium Bicarb/ Potassium Chloride (K-Lyte Cl  Eff) 25 meq ONCE  ONCE PO  

Last administered on 11/13/17at 07:26;  Start 11/13/17 at 06:45;  Stop 11/13/17 

at 06:51;  Status DC


Lactulose (Lactulose Liq) 30 ml DAILY NG  Last administered on 11/15/17at 09:00

;  Start 11/13/17 at 09:00


Docusate Sodium (Colace) 100 mg BID PO  Last administered on 11/16/17at 09:29;  

Start 11/13/17 at 09:00


Magnesium Sulfate/ Dextrose 100 ml @  100 mls/hr ONCE  ONCE IV  Last 

administered on 11/13/17at 08:57;  Start 11/13/17 at 08:45;  Stop 11/13/17 at 09

:44;  Status DC


Sodium Chloride 250 ml @ 0 mls/hr BOLUS  ONCE IV  Last administered on 11/13/ 17at 10:30;  Start 11/13/17 at 10:30;  Stop 11/13/17 at 10:31;  Status DC


Sodium Chloride 500 ml @  500 mls/hr BOLUS  ONCE IV  Last administered on 11/13/ 17at 13:11;  Start 11/13/17 at 13:15;  Stop 11/13/17 at 14:14;  Status DC


Sodium Chloride 500 ml @  500 mls/hr Q1H ONCE IV  Last administered on 11/13/ 17at 17:36;  Start 11/13/17 at 17:30;  Stop 11/13/17 at 18:29;  Status DC


Ferrous Sulfate (Ferrous Sulfate) 325 mg BID PO  Last administered on 11/16/ 17at 09:28;  Start 11/13/17 at 21:00


Oxycodone/ Acetaminophen (Percocet 7.5-325 Mg) 1 tab Q6H  PRN PO pain 5-10 Last 

administered on 11/16/17at 09:27;  Start 11/13/17 at 18:15


Albumin Human 500 ml @  250 mls/hr ONCE  ONCE IV  Last administered on 11/14/ 17at 04:26;  Start 11/14/17 at 03:45;  Stop 11/14/17 at 05:44;  Status DC


Furosemide (Lasix Inj) 40 mg STK-MED ONCE .ROUTE  Last administered on 11/14/ 17at 09:04;  Start 11/14/17 at 09:04;  Stop 11/14/17 at 14:59;  Status DC


Potassium Bicarb/ Potassium Chloride (K-Lyte Cl  Eff) 25 meq ONCE  ONCE PO  

Last administered on 11/15/17at 09:00;  Start 11/15/17 at 09:00;  Stop 11/15/17 

at 09:01;  Status DC


Potassium Chloride (KCl) 30 meq ONCE  ONCE PO ;  Start 11/15/17 at 11:00;  Stop 

11/15/17 at 11:00;  Status DC


Potassium Phos/ Sodium Phos (K-Phos Neutral) 250 mg Q8HR PO  Last administered 

on 11/16/17at 05:28;  Start 11/15/17 at 08:30


Bisacodyl (Dulcolax Supp) 10 mg ONCE  ONCE RECTAL  Last administered on 11/15/

17at 11:01;  Start 11/15/17 at 10:00;  Stop 11/15/17 at 10:20;  Status DC


Sodium Biphosphate/ Sodium Phosphate (Fleets Enema (Adult)) 133 ml ONCE  ONCE 

NC ;  Start 11/15/17 at 10:00;  Stop 11/15/17 at 10:20;  Status DC


Potassium Bicarb/ Potassium Chloride (K-Lyte Cl  Eff) 25 meq ONCE  ONCE PO  

Last administered on 11/15/17at 11:01;  Start 11/15/17 at 10:00;  Stop 11/15/17 

at 10:20;  Status DC


Lidocaine HCl (Xylocaine-Mpf 1% Inj) 50 ml STK-MED ONCE OTHER ;  Start 11/8/17 

at 12:00;  Stop 11/15/17 at 10:58;  Status DC


Rocuronium Bromide (Zemuron Inj) 50 mg STK-MED ONCE IV PUSH ;  Start 11/8/17 at 

12:00;  Stop 11/15/17 at 10:58;  Status DC


Neostigmine Methylsulfate (Prostigmin Inj) 3 mg STK-MED ONCE IV ;  Start 11/8/ 17 at 12:00;  Stop 11/15/17 at 10:58;  Status DC


Glycopyrrolate (Robinul Inj) 1 mg STK-MED ONCE IV PUSH ;  Start 11/8/17 at 12:00

;  Stop 11/15/17 at 10:58;  Status DC


Phenylephrine HCl (Neosynephrine/ NS 1000 Mcg/10ml Syr) 1,000 mcg STK-MED ONCE 

IV ;  Start 11/8/17 at 12:00;  Stop 11/15/17 at 10:58;  Status DC


Ondansetron HCl (Zofran Inj) 4 mg STK-MED ONCE IV PUSH ;  Start 11/8/17 at 12:00

;  Stop 11/15/17 at 10:58;  Status DC


Fentanyl Citrate (fentaNYL INJ) 200 mcg STK-MED ONCE IV ;  Start 11/8/17 at 12:

00;  Stop 11/15/17 at 10:58;  Status DC


Morphine Sulfate (Morphine Inj) 4 mg STK-MED ONCE IV ;  Start 11/8/17 at 12:00;

  Stop 11/15/17 at 10:58;  Status DC


Propofol (Diprivan  200 Mg/20 ml Inj) 200 mg STK-MED ONCE IV ;  Start 11/8/17 

at 12:00;  Stop 11/15/17 at 10:58;  Status DC


Parenteral Electrolytes 1,000 ml @  As Directed STK-MED ONCE IV ;  Start 11/8/ 17 at 12:00;  Stop 11/15/17 at 10:58;  Status DC


Lactated Ringer's 1,000 ml @  As Directed STK-MED ONCE IV ;  Start 11/8/17 at 12

:00;  Stop 11/15/17 at 10:58;  Status DC


Methylprednisolone Sodium Succinate (SoluMEDROL INJ) 40 mg BID IV PUSH  Last 

administered on 11/16/17at 09:29;  Start 11/15/17 at 21:00


Bethanechol Chloride (Urecholine) 25 mg Q8H PO  Last administered on 11/16/17at 

04:00;  Start 11/15/17 at 20:00


Furosemide (Lasix Inj) 40 mg DAILY IV PUSH  Last administered on 11/16/17at 09:

28;  Start 11/16/17 at 09:00


Potassium Chloride (KCl) 30 meq DAILY PO  Last administered on 11/16/17at 09:29

;  Start 11/16/17 at 09:00





A/P


Problem List:  


(1) Pleural effusion on left


ICD Code:  J90 - Pleural effusion, not elsewhere classified


Status:  Acute


(2) Respiratory distress


ICD Code:  R06.00 - Dyspnea, unspecified


Status:  Resolved


(3) Hyponatremia


ICD Code:  E87.1 - Hypo-osmolality and hyponatremia


Status:  Acute


(4) COPD (chronic obstructive pulmonary disease)


ICD Code:  J44.9 - Chronic obstructive pulmonary disease, unspecified


Status:  Chronic


(5) Tobacco abuse


ICD Code:  Z72.0 - Tobacco use


Status:  Chronic


Assessment and Plan


ASSESSMENT 


Acute hypoxemic respiratory failure


Complete left lung atelectasis


Left-sided consolidation with pleural effusion


Left thoracotomy with decortication, evacuation of complex loculated effusion, 

adhesiolysis


Pulmonary hypertension


COPD exacerbation


Hyponatremia/hypophosphatemia


Hypokalemia.


Urinary tract infection on abx








PLAN:


Neuro:


Hold propofol and reduce fentanyl to facilitate weaning trials


Pain control with fentanyl--off all sedation at this time


ORAL PAIN CONTROL





Pulm: 


Acute hypoxemic respiratory failure


Complete L lung atelectasis


S/p decortication for L hemothorax


COPD


Tobacco abuse


?L lung mass vs loculated effusion/


Emergently intubated and placed on mechanical ventilation 11/12


s/p Bronchoscopy by Dr. Grey.  Left lung fields reexpanded, persistent 

volume loss involving left lower lung field


Start weaning trials with possible extubation--extubated November 13


Status post left thoracotomy and decortication 11/8/17-postop diagnosis was 

hemothorax


Bronchodilators, Solumedrol 60 mg IV q8 hours 


s/p CT guided left-sided thoracentesis and CT placement by IR 10/26/17


CT chest: Either highly complex fluid or soft tissue mass involving the left 

lung base with mass effect. This measures 12.2 x 12.6 x 7.4 cm. 


4.4 cm ascending aortic aneurysm.


--Status post bronchoscopy with left sided bronchial alveolar lavage by 

pulmonary on November 13


CHEST TUBE REMOVED


LOTS OF DRAINAGE FROM CHEST TUBE SITE





CV:  


Pulmonary hypertension, most likely secondary from chronic hypoxia by echo


Moderate aortic stenosis mild to moderate aortic regurgitation


Monitor HR and BP and maintain MAP>65mmHg


Echo 10/27 - systolic function grossly normal. Moderate AS/AI.  Moderate to 

severe pulmonary hypertension.. 





GI: 


Nothing by mouth-has been extubated


Diet as tolerated





: 


Hyponatremia ?chronic but chronicity not truly known


Hypokalemia, resolved- STILL LOW WILL REPLACE





ID: 


UTI


Possible pneumonia


Continue Rocephin for now


UTI present on admission with culture positive for pansensitive Klebsiella.


Influenza screen, blood and pleural fluid cultures negative to date. 


Temporal fluid studies from November 8, on cultures negative to date


URINARY RETENTION- STARTED ON BETHANECHOL YESTERDAY - WILL ADD FLOMAX





Endo: 


SSI with Accu-Cheks for glycemic control.





Heme: 


Macrocytic anemia 


Anemia of chronic disease and iron deficiency


Monitor CBC.  Normal B12, 


Supplement iron due to her lab findings consistent with iron deficiency


Being transfused 1 unit packed red blood cells today





DVT prophylaxis with  SCDs. Lovenox 40 mg sq daily. IV Famotidine 20 mg IV q12h


NOT BEING TRANSFUSED TODAY





Needs physical therapy and occupational therapy aggressively








NEEDS TO GET MOBILE





AM LABS


Discharge Planning


Needs to be more mobile


May require [facility] IF ABLE TO GET APPROVAL





Problem Qualifiers





(1) COPD (chronic obstructive pulmonary disease):  


Qualified Codes:  J43.1 - Panlobular emphysema








Roland Foote DO Nov 16, 2017 10:12

## 2017-11-16 NOTE — [email or web address]
CVT Progress Note


Subjective/Hospital Course:


56-year-old white female with a history of COPD, chronic bronchitis who 

apparently was suffering from food poisoning.  The patient states that she was 

unable to pass urine and became very weak, had lost weight with diarrhea, 

abdominal discomfort, dehydration and presented to ER for evaluation. She c/o 

20 lb weight loss over the past 4 months. Chest CT done showed evidence of a 

loculated  mass-like infiltrate in the left lung base 12 x 12 cm and a left 

upper lobe lung nodule 7 mm.  The patient was started on Solu-Medrol, IV 

antibiotics including Rocephin and Zithromax.  Denies chest pains or abdominal 

pains or nausea or vomiting. She underwent chest tube placement in IR 10/26


with minimal drainage of this effusion.. Chest tube was removed. Repeat chest x-

ray shows no change in the mod pleural effusion but does shows some mild patchy 

opacities in the right lung base. 


cardiology Dr Sebastián de la torre pt   TTE shows preserved EF and at least moderate AS/AI.





present diagnosis : Respiratory Insufficiency, Left-sided consolidation , 

Exudative pleural effusion, COPD exacerbation, Urinary tract infection, x 

tobacco use


Anemia, thrombocytopenia, Moderate aortic valve regurgitation, Moderate 

calcific aortic valve stenosis





pleural fluid : exudative effusion. Cytology neg for malignant cells. Fluid cx 

neg 


CT chest 10/26 : Either highly complex fluid or soft tissue mass involving the 

left lung base with mass effect. This measures 12.2 x 12.6 x 7.4 cm.  4.4 cm 

ascending aortic aneurysm.





pt is now agreeable for surgery : plan is for left thoracoscopic exploration 

for loculated pleural effusion , possible  thoracotomy , decortication in am 





11/8


on nasal cannula 


surgery: Left thoracoscopic exploration, left thoracotomy for decortication, 

evacuation of complex loculated effusion, adhesiolysis





11/9


pain controlled with PCA morphine 


needs aggressive pulm toileting 


OOB, ambulate 


recheck UA / pleural fluid cultures and path pending 





11/10


 chest tubes drained 130cc/ 12 hrs serous drainage / leave chest tube in for 

now 


CXR noted , from 11/9, mucus plugging, aggressive pulm toileting 


add ezpap and acapella 


now on Rocephin, will dc ancef, await on UA culture 





OOB, ambulate, 





11/11/17


No complaints today.  Denies dyspnea, but CXR shows complete opacification of 

left lung





11/12/17


Hypoxic this morning despite 100% FiO2.  Transferred to CVICU and reintubated.  

Bronchoscopy pending.





11/13


remains in CVICU, hematology consulted 


s/p Bronch 


chest tube to water seal 





11/14


now on nasal cannula 2 liters 


continue pulm toileting 


appreciate Hematology 





will transfer to stepdown  


continue PT/OT





11/15


chest tubes with minimal drainage


both chest tubes removed without difficulty 


on 2 liter nasal cannula 


need aggressive PT/OT


replace K+/ diuresis later if stable 


she will need SNF at discharge / very deconditioned 


will give ducolax supp / fleets today 





11/16


need to be OOB, ambulate 


CXR noted , PTX stable


Objective:


GENERAL: 


SKIN: Warm and dry. dressing to left postero lateral chest intact 


HEAD: Normocephalic.


EYES: No scleral icterus. No injection or drainage. 


NECK: Supple, trachea midline. No JVD or lymphadenopathy.


CARDIOVASCULAR: Regular rate and rhythm without murmurs, gallops, or rubs. 


RESPIRATORY: Breath sounds equal bilaterally. No accessory muscle use.


GASTROINTESTINAL: Abdomen soft, non-tender, nondistended. 


MUSCULOSKELETAL: No cyanosis, or edema. 


BACK: Nontender without obvious deformity. No CVA tenderness.








Vital Signs








  Date Time  Temp Pulse Resp B/P (MAP) Pulse Ox O2 Delivery O2 Flow Rate FiO2


 


11/16/17 14:25  83      


 


11/16/17 13:00  86      


 


11/16/17 12:14      Room Air  


 


11/16/17 12:00  86      


 


11/16/17 11:47 97.9 85 18 162/86 (111) 95   


 


11/16/17 11:00  86      


 


11/16/17 11:00  87      


 


11/16/17 10:00  100      


 


11/16/17 09:17      Room Air  


 


11/16/17 09:13 97.9 98 18 143/84 (103) 94   


 


11/16/17 09:00  96      


 


11/16/17 08:00  88      


 


11/16/17 07:00  96      


 


11/16/17 06:00  82      


 


11/16/17 05:37     95   


 


11/16/17 05:00  94      


 


11/16/17 04:00     95 Room Air  


 


11/16/17 03:00  90      


 


11/16/17 02:00  92      


 


11/16/17 01:00  104      


 


11/16/17 00:00 98.0 106 18 147/87 (107) 95   


 


11/16/17 00:00  94      


 


11/16/17 00:00     97 Room Air  


 


11/15/17 23:00  92      


 


11/15/17 22:00  84      


 


11/15/17 21:00  90      


 


11/15/17 20:00     97 Room Air  


 


11/15/17 20:00 98.3 93 18 128/74 (92) 95   


 


11/15/17 20:00  108      


 


11/15/17 18:41   19     


 


11/15/17 18:03  97      


 


11/15/17 17:00  93      








Labs:





Laboratory Tests








Test


  11/16/17


05:20


 


White Blood Count


  4.3 TH/MM3


(4.0-11.0)


 


Red Blood Count


  2.90 MIL/MM3


(4.00-5.30)


 


Hemoglobin


  10.1 GM/DL


(11.6-15.3)


 


Hematocrit


  29.3 %


(35.0-46.0)


 


Mean Corpuscular Volume


  101.0 FL


(80.0-100.0)


 


Mean Corpuscular Hemoglobin


  34.9 PG


(27.0-34.0)


 


Mean Corpuscular Hemoglobin


Concent 34.6 %


(32.0-36.0)


 


Red Cell Distribution Width


  21.2 %


(11.6-17.2)


 


Platelet Count


  67 TH/MM3


(150-450)


 


Mean Platelet Volume


  9.7 FL


(7.0-11.0)


 


Neutrophils (%) (Auto)


  82.9 %


(16.0-70.0)


 


Lymphocytes (%) (Auto)


  9.4 %


(9.0-44.0)


 


Monocytes (%) (Auto)


  7.6 %


(0.0-8.0)


 


Eosinophils (%) (Auto)


  0.0 %


(0.0-4.0)


 


Basophils (%) (Auto)


  0.1 %


(0.0-2.0)


 


Neutrophils # (Auto)


  3.6 TH/MM3


(1.8-7.7)


 


Lymphocytes # (Auto)


  0.4 TH/MM3


(1.0-4.8)


 


Monocytes # (Auto)


  0.3 TH/MM3


(0-0.9)


 


Eosinophils # (Auto)


  0.0 TH/MM3


(0-0.4)


 


Basophils # (Auto)


  0.0 TH/MM3


(0-0.2)


 


CBC Comment AUTO DIFF 


 


Differential Comment


  AUTO DIFF


CONFIRMED


 


Platelet Estimate LOW (NORMAL) 


 


Platelet Morphology Comment


  NORMAL


(NORMAL)


 


Blood Urea Nitrogen


  10 MG/DL


(7-18)


 


Creatinine


  0.52 MG/DL


(0.50-1.00)


 


Random Glucose


  121 MG/DL


()


 


Total Protein


  5.2 GM/DL


(6.4-8.2)


 


Albumin


  2.6 GM/DL


(3.4-5.0)


 


Calcium Level


  8.1 MG/DL


(8.5-10.1)


 


Phosphorus Level


  2.2 MG/DL


(2.5-4.9)


 


Magnesium Level


  2.0 MG/DL


(1.5-2.5)


 


Alkaline Phosphatase


  174 U/L


()


 


Aspartate Amino Transf


(AST/SGOT) 38 U/L (15-37) 


 


 


Alanine Aminotransferase


(ALT/SGPT) 41 U/L (10-53) 


 


 


Total Bilirubin


  0.7 MG/DL


(0.2-1.0)


 


Sodium Level


  140 MEQ/L


(136-145)


 


Potassium Level


  3.9 MEQ/L


(3.5-5.1)


 


Chloride Level


  102 MEQ/L


()


 


Carbon Dioxide Level


  30.9 MEQ/L


(21.0-32.0)


 


Anion Gap 7 MEQ/L (5-15) 


 


Estimat Glomerular Filtration


Rate 122 ML/MIN


(>89)








Result Diagram:  


11/16/17 0520 11/16/17 0520





Telemetry:


NSR





(1) COPD (chronic obstructive pulmonary disease)


Plan:  on nebs, steroids  


I





(2) Tobacco abuse


Plan:  smoking cessation





(3) Physical deconditioning


Plan:  pt 





(4) Pleural effusion on left


Plan:  s/p left thoracoscopic exploration for loculated pleural effusion 


path negative for malignant cells 





cultures neg 





OOB PT/OT 





eval for rehab


chest tubes removed 


allow pt to ambulate more today





s/p Bronch 11/13











 














(5) Pancytopenia


Plan:  appreciate hematology input 





--Pancytopenia with macrocytosis and normal B12.  


--suspect myelodysplastic syndrome until proven otherwise.


--will need bone marrow biopsy and aspirate. (declining at this time)


--iron studies are consistent with anemia of chronic disease 








--blood transfusion if the hemoglobin is less than 8 and platelet transfusion 

if the platelet count is less than 15 or any bleeding / per Heme 








Problem Qualifiers





(1) COPD (chronic obstructive pulmonary disease):  


Qualified Codes:  J43.1 - Panlobular emphysema








Terwilliger,Jacqueline R. ARNP Nov 16, 2017 15:58

## 2017-11-16 NOTE — PD.ONC.PN
Subjective


Subjective


Remarks


Afebrile


"It keeps draining where I had the chest tube removed"


Per RN she will be having a catheter placed as she is unable to void.





Objective


Data











  Date Time  Temp Pulse Resp B/P (MAP) Pulse Ox O2 Delivery O2 Flow Rate FiO2


 


11/16/17 12:14      Room Air  


 


11/16/17 11:47 97.9 85 18 162/86 (111) 95   


 


11/16/17 09:17      Room Air  


 


11/16/17 09:13 97.9 98 18 143/84 (103) 94   


 


11/16/17 06:00  82      


 


11/16/17 05:37     95   


 


11/16/17 05:00  94      


 


11/16/17 04:00     95 Room Air  


 


11/16/17 03:00  90      


 


11/16/17 02:00  92      


 


11/16/17 01:00  104      


 


11/16/17 00:00 98.0 106 18 147/87 (107) 95   


 


11/16/17 00:00  94      


 


11/16/17 00:00     97 Room Air  


 


11/15/17 23:00  92      


 


11/15/17 22:00  84      


 


11/15/17 21:00  90      


 


11/15/17 20:00     97 Room Air  


 


11/15/17 20:00 98.3 93 18 128/74 (92) 95   


 


11/15/17 20:00  108      


 


11/15/17 18:41   19     


 


11/15/17 18:03  97      


 


11/15/17 17:00  93      


 


11/15/17 15:00  79      


 


11/15/17 15:00 98.2 90 19 128/74 (92) 96   


 


11/15/17 15:00     96 Room Air  


 


11/15/17 14:00  89      


 


11/15/17 13:00  103      














 11/16/17 11/16/17 11/16/17





 07:00 15:00 23:00


 


Intake Total 960 ml  


 


Output Total 1650 ml  


 


Balance -690 ml  








Result Diagram:  


11/16/17 0520 11/16/17 0520





Laboratory Results





Laboratory Tests








Test


  11/15/17


14:00 11/16/17


05:20


 


Potassium Level 3.9 MEQ/L  3.9 MEQ/L 


 


White Blood Count  4.3 TH/MM3 


 


Red Blood Count  2.90 MIL/MM3 


 


Hemoglobin  10.1 GM/DL 


 


Hematocrit  29.3 % 


 


Mean Corpuscular Volume  101.0 FL 


 


Mean Corpuscular Hemoglobin  34.9 PG 


 


Mean Corpuscular Hemoglobin


Concent 


  34.6 % 


 


 


Red Cell Distribution Width  21.2 % 


 


Platelet Count  67 TH/MM3 


 


Mean Platelet Volume  9.7 FL 


 


Neutrophils (%) (Auto)  82.9 % 


 


Lymphocytes (%) (Auto)  9.4 % 


 


Monocytes (%) (Auto)  7.6 % 


 


Eosinophils (%) (Auto)  0.0 % 


 


Basophils (%) (Auto)  0.1 % 


 


Neutrophils # (Auto)  3.6 TH/MM3 


 


Lymphocytes # (Auto)  0.4 TH/MM3 


 


Monocytes # (Auto)  0.3 TH/MM3 


 


Eosinophils # (Auto)  0.0 TH/MM3 


 


Basophils # (Auto)  0.0 TH/MM3 


 


CBC Comment  AUTO DIFF 


 


Differential Comment


  


  AUTO DIFF


CONFIRMED


 


Platelet Estimate  LOW 


 


Platelet Morphology Comment  NORMAL 


 


Blood Urea Nitrogen  10 MG/DL 


 


Creatinine  0.52 MG/DL 


 


Random Glucose  121 MG/DL 


 


Total Protein  5.2 GM/DL 


 


Albumin  2.6 GM/DL 


 


Calcium Level  8.1 MG/DL 


 


Phosphorus Level  2.2 MG/DL 


 


Magnesium Level  2.0 MG/DL 


 


Alkaline Phosphatase  174 U/L 


 


Aspartate Amino Transf


(AST/SGOT) 


  38 U/L 


 


 


Alanine Aminotransferase


(ALT/SGPT) 


  41 U/L 


 


 


Total Bilirubin  0.7 MG/DL 


 


Sodium Level  140 MEQ/L 


 


Chloride Level  102 MEQ/L 


 


Carbon Dioxide Level  30.9 MEQ/L 


 


Anion Gap  7 MEQ/L 


 


Estimat Glomerular Filtration


Rate 


  122 ML/MIN 


 








Imaging Studies





Last 24 hours Impressions








Chest X-Ray 11/16/17 0600 Signed





Impressions: 





 Service Date/Time:  Thursday, November 16, 2017 03:46 - CONCLUSION:  Stable 





 examination. Residual pleural effusion on the left and residual pleural air 





 similar to the previous study.       Ernesto Wyman MD 











Administered Medications








 Medications


  (Trade)  Dose


 Ordered  Sig/Emre


 Route


 PRN Reason  Start Time


 Stop Time Status Last Admin


Dose Admin


 


 Sennosides


  (Senokot)  17.2 mg  Q12H  PRN


 PO


 Moderate constipation  10/26/17 11:30


    11/14/17 02:19


 


 


 Nifedipine


  (Procardia Xl)  30 mg  DAILY


 PO


   11/3/17 09:00


   Future Hold 11/14/17 08:32


 


 


 Furosemide


  (Lasix)  20 mg  DAILY


 PO


   11/2/17 17:35


   Future Hold 11/12/17 09:21


 


 


 Lactulose


  (Lactulose Liq)  30 ml  QID  PRN


 PO


 severe constipation   11/7/17 13:00


    11/8/17 08:42


 


 


 Albuterol Sulfate


  (Albuterol Neb)  2.5 mg  Q2HR NEB  PRN


 NEB


 WHEEZING  11/8/17 19:30


    11/16/17 10:51


 


 


 IV Flush


  (NS Flush)  2 ml  BID


 IV FLUSH


   11/8/17 21:00


    11/16/17 09:29


 


 


 Pantoprazole


 Sodium


  (Protonix)  40 mg  HS


 PO


   11/8/17 21:00


    11/15/17 22:06


 


 


 Ondansetron HCl


  (Zofran Inj)  4 mg  Q6H  PRN


 IV PUSH


 NAUSEA OR VOMITING  11/8/17 19:30


    11/12/17 05:58


 


 


 Sodium Chloride  1,000 ml @ 


 30 mls/hr  Q24H


 IV


   11/8/17 21:00


    11/11/17 21:00


 


 


 Polyethylene


 Glycol


  (Miralax)  17 gm  DAILY


 PO


   11/9/17 09:00


    11/16/17 09:29


 


 


 Ceftriaxone


 Sodium 1000 mg/


 Sodium Chloride  100 ml @ 


 200 mls/hr  Q24H


 IV


   11/9/17 23:00


    11/16/17 00:26


 


 


 Chlorhexidine


 Gluconate


  (Peridex 0.12%


 Liq)  15 ml  BID@08,20


 MT


   11/12/17 20:00


    11/12/17 21:32


 


 


 Enoxaparin Sodium


  (Lovenox Inj)  40 mg  Q24H


 SQ


   11/12/17 12:00


   Future Hold 11/12/17 12:54


 


 


 Lactulose


  (Lactulose Liq)  30 ml  DAILY


 NG


   11/13/17 09:00


    11/15/17 09:00


 


 


 Docusate Sodium


  (Colace)  100 mg  BID


 PO


   11/13/17 09:00


    11/16/17 09:29


 


 


 Ferrous Sulfate


  (Ferrous Sulfate)  325 mg  BID


 PO


   11/13/17 21:00


    11/16/17 09:28


 


 


 Oxycodone/


 Acetaminophen


  (Percocet


 7.5-325 Mg)  1 tab  Q6H  PRN


 PO


 pain 5-10  11/13/17 18:15


    11/16/17 09:27


 


 


 Potassium Phos/


 Sodium Phos


  (K-Phos Neutral)  250 mg  Q8HR


 PO


   11/15/17 08:30


    11/16/17 05:28


 


 


 Methylprednisolone


 Sodium Succinate


  (SoluMEDROL INJ)  40 mg  BID


 IV PUSH


   11/15/17 21:00


    11/16/17 09:29


 


 


 Bethanechol


 Chloride


  (Urecholine)  25 mg  Q8H


 PO


   11/15/17 20:00


    11/16/17 12:03


 


 


 Furosemide


  (Lasix Inj)  40 mg  DAILY


 IV PUSH


   11/16/17 09:00


    11/16/17 09:28


 


 


 Potassium Chloride


  (KCl)  30 meq  DAILY


 PO


   11/16/17 09:00


    11/16/17 09:29


 


 


 Tamsulosin HCl


  (Flomax)  0.4 mg  DAILY


 PO


   11/16/17 11:00


    11/16/17 12:03


 








Objective Remarks


GENERAL: Chronically ill appearing 56-year-old female sitting up on bedside 

commode in no obvious distress


SKIN: Warm and dry. Multiple bruises scattered to arms and upper back. Large 

dressing in place to left side of thorax.


HEAD: Normocephalic.


EYES: No injection or drainage. 


NECK: Supple, trachea midline. 


CARDIOVASCULAR: Regular rate and rhythm without murmurs. 


RESPIRATORY: Clear anteriorly. Breathing unlabored at rest.


GASTROINTESTINAL: Abdomen soft, non-tender, nondistended. 


EXTREMITIES: No cyanosis. 


MUSCULOSKELETAL: Generalized weakness


NEUROLOGICAL: No obvious focal deficit. Awake, alert, and oriented x3.





Assessment/Plan


Problem List:  


(1) Pancytopenia


ICD Codes:  D61.818 - Other pancytopenia


Plan:  11/16: Patient continues to decline bone marrow biopsy. Pancytopenia 

improved today. Monitor CBC


--Pancytopenia with macrocytosis and normal B12.  


--suspect that she has myelodysplastic syndrome until proven otherwise.


--will need bone marrow biopsy and aspirate. (declining at this time)


--iron studies are consistent with anemia of chronic disease 


--blood transfusion if the hemoglobin is less than 8 and platelet transfusion 

if the platelet count is less than 15 or any bleeding .





Assessment


55y/o female with pancytopenia with macrocytosis.


h/o COPD


Attending Statement


The exam, history, and the medical decision-making described in the above note 

were completed with the assistance of the mid-level provider. I reviewed and 

agree with the findings presented.  I attest that I had a face-to-face 

encounter with the patient on the same day, and personally performed and 

documented my assessment and findings in the medical record.


No new complaints


Chest tube is out


Leukopenia resolved


Macrocytic anemia Persist


Low platelets improving


Continue to monitor CBC


Will follow











Janiya Ramirez Nov 16, 2017 12:55


Velma Kapoor MD Nov 16, 2017 23:11

## 2017-11-17 NOTE — HHI.PR
Subjective


Remarks


S/P thoracotomy  and  decortication . Chest tube is  out .  Feels  better 

.Refused  Bone  marrow  biopsy.


No fever.





Objective





Vital Signs








  Date Time  Temp Pulse Resp B/P (MAP) Pulse Ox O2 Delivery O2 Flow Rate FiO2


 


11/17/17 11:43     98 Room Air  


 


11/17/17 11:43 97.8 98 21 130/75 (93) 98   


 


11/17/17 07:23 97.7 87 27 123/64 (83) 98   


 


11/17/17 07:23     98 Room Air  


 


11/17/17 06:02  91      


 


11/17/17 05:02  92      


 


11/17/17 04:00  84      


 


11/17/17 03:31      Room Air  


 


11/17/17 03:29 97.5 86 16 127/73 (91) 97   


 


11/17/17 03:00  88      


 


11/17/17 02:12  83      


 


11/17/17 01:00  94      


 


11/17/17 00:00  90      


 


11/16/17 23:40     98 Room Air  


 


11/16/17 23:40 97.6 95 20 126/65 (85) 98   


 


11/16/17 23:00  93      


 


11/16/17 22:00  94      


 


11/16/17 21:00  90      


 


11/16/17 20:00  88      


 


11/16/17 19:45 97.9 88 16 118/71 (87) 100   


 


11/16/17 19:45     100 Room Air  


 


11/16/17 19:00  88      


 


11/16/17 18:00  90      


 


11/16/17 17:00  104      


 


11/16/17 16:59 98.2 101 18 110/76 (87) 99   


 


11/16/17 16:59      Room Air  


 


11/16/17 16:00  106      


 


11/16/17 15:00  109      


 


11/16/17 14:25  83      


 


11/16/17 14:00  86      


 


11/16/17 13:00  86      














I/O      


 


 11/16/17 11/16/17 11/16/17 11/17/17 11/17/17 11/17/17





 07:00 15:00 23:00 07:00 15:00 23:00


 


Intake Total 960 ml  820 ml 480 ml  


 


Output Total 1650 ml  1350 ml 300 ml  


 


Balance -690 ml  -530 ml 180 ml  


 


      


 


Intake Oral 960 ml  720 ml 480 ml  


 


IV Total   100 ml   


 


Output Urine Total 1650 ml  1350 ml 300 ml  


 


Bladder Scan Volume Amount 669 ml     





 669 ml     





 669 ml     


 


# Bowel Movements   1   








Result Diagram:  


11/17/17 0414 11/17/17 0414





Objective Remarks


GENERAL:  This is an averagely built middle-aged white female who is alert


HEENT:  Head normocephalic.  Pupils are reactive and equal.  Tongue moist.


Throat is clear.  Nasal mucosa clear.


NECK:  Supple.  No bruits, thyroid enlargement or lymphadenopathy.


CHEST:  Distant breath sounds at the left  lung base and occ wheeze left  chest 

.


HEART:  The heart sounds are irregular, S1-S2.  No murmur.  No S3.


ABDOMEN:  Soft and nontender.  No organomegaly.  Bowel sounds are active.


EXTREMITIES:  No Edema with diminished peripheral pulses.   NEUROLOGIC:


Reflexes are 1+ . No deficits


SKIN: Dry and scaly.





Assessment and Plan


Assessment and Plan





 


IMPRESSION


1.  COPD with acute exacerbation, resolved


2.  S/p decortication  left  Chest


3.  Atelectasis left lower lobe with mucus plugging.


4.  Hyponatremia.


5.  Abnormal liver enzymes


6.  Anemia of chronic disease.











Plan :








1. D/C O2





2. IS at bedside q3h





3. D/C  solumedrol 





4. D/C Duoneb   nebs





5. Prednisone 10  mg bid and taper off in 10 days





6. Add Spiriva , 1 cap daily





7. Up with help





8. Anemia W/U and Bone  marrow  biopsy











NATA Grey MD Nov 17, 2017 12:28

## 2017-11-17 NOTE — PD.CAR.PN
CVT Progress Note


Subjective/Hospital Course:


56-year-old white female with a history of COPD, chronic bronchitis who 

apparently was suffering from food poisoning.  The patient states that she was 

unable to pass urine and became very weak, had lost weight with diarrhea, 

abdominal discomfort, dehydration and presented to ER for evaluation. She c/o 

20 lb weight loss over the past 4 months. Chest CT done showed evidence of a 

loculated  mass-like infiltrate in the left lung base 12 x 12 cm and a left 

upper lobe lung nodule 7 mm.  The patient was started on Solu-Medrol, IV 

antibiotics including Rocephin and Zithromax.  Denies chest pains or abdominal 

pains or nausea or vomiting. She underwent chest tube placement in IR 10/26


with minimal drainage of this effusion.. Chest tube was removed. Repeat chest x-

ray shows no change in the mod pleural effusion but does shows some mild patchy 

opacities in the right lung base. 


cardiology Dr Sebastián de la torre pt   TTE shows preserved EF and at least moderate AS/AI.





present diagnosis : Respiratory Insufficiency, Left-sided consolidation , 

Exudative pleural effusion, COPD exacerbation, Urinary tract infection, x 

tobacco use


Anemia, thrombocytopenia, Moderate aortic valve regurgitation, Moderate 

calcific aortic valve stenosis





pleural fluid : exudative effusion. Cytology neg for malignant cells. Fluid cx 

neg 


CT chest 10/26 : Either highly complex fluid or soft tissue mass involving the 

left lung base with mass effect. This measures 12.2 x 12.6 x 7.4 cm.  4.4 cm 

ascending aortic aneurysm.





pt is now agreeable for surgery : plan is for left thoracoscopic exploration 

for loculated pleural effusion , possible  thoracotomy , decortication in am 





11/8


on nasal cannula 


surgery: Left thoracoscopic exploration, left thoracotomy for decortication, 

evacuation of complex loculated effusion, adhesiolysis





11/9


pain controlled with PCA morphine 


needs aggressive pulm toileting 


OOB, ambulate 


recheck UA / pleural fluid cultures and path pending 





11/10


 chest tubes drained 130cc/ 12 hrs serous drainage / leave chest tube in for 

now 


CXR noted , from 11/9, mucus plugging, aggressive pulm toileting 


add ezpap and acapella 


now on Rocephin, will dc ancef, await on UA culture 





OOB, ambulate, 





11/11/17


No complaints today.  Denies dyspnea, but CXR shows complete opacification of 

left lung





11/12/17


Hypoxic this morning despite 100% FiO2.  Transferred to CVICU and reintubated.  

Bronchoscopy pending.





11/13


remains in CVICU, hematology consulted 


s/p Bronch 


chest tube to water seal 





11/14


now on nasal cannula 2 liters 


continue pulm toileting 


appreciate Hematology 





will transfer to stepdown  


continue PT/OT





11/15


chest tubes with minimal drainage


both chest tubes removed without difficulty 


on 2 liter nasal cannula 


need aggressive PT/OT


replace K+/ diuresis later if stable 


she will need SNF at discharge / very deconditioned 


will give ducolax supp / fleets today 





11/16


need to be OOB, ambulate 


CXR noted , PTX stable 








11/17


pt needs much encouragement to get OOB, ambulate, very weak 


recommended she agree to Bone marrow bx 


dressing in place left chest wall, some serous drainage 


on Room air 


very painful , depressed , may need psych eval 


will leave to PCP, ok to transfer out of [facility] 


will follow prn, she has outpt appointment  for f/u in our office


Objective:


GENERAL:  very weak, painful 


SKIN: Warm and dry. dressing in place left postero lateral chest wall 


HEAD: Normocephalic.


EYES: No scleral icterus. No injection or drainage. 


NECK: Supple, trachea midline. No JVD or lymphadenopathy.


CARDIOVASCULAR: Regular rate and rhythm without murmurs, gallops, or rubs. 


RESPIRATORY: coarse bilateral breath sounds , poor cough effort 


 No accessory muscle use.


GASTROINTESTINAL: Abdomen soft, non-tender, nondistended. 


MUSCULOSKELETAL: No cyanosis, or edema. 


BACK: Nontender without obvious deformity. No CVA tenderness.








Vital Signs








  Date Time  Temp Pulse Resp B/P (MAP) Pulse Ox O2 Delivery O2 Flow Rate FiO2


 


11/17/17 07:23 97.7 87 27 123/64 (83) 98   


 


11/17/17 07:23     98 Room Air  


 


11/17/17 06:02  91      


 


11/17/17 05:02  92      


 


11/17/17 04:00  84      


 


11/17/17 03:31      Room Air  


 


11/17/17 03:29 97.5 86 16 127/73 (91) 97   


 


11/17/17 03:00  88      


 


11/17/17 02:12  83      


 


11/17/17 01:00  94      


 


11/17/17 00:00  90      


 


11/16/17 23:40     98 Room Air  


 


11/16/17 23:40 97.6 95 20 126/65 (85) 98   


 


11/16/17 23:00  93      


 


11/16/17 22:00  94      


 


11/16/17 21:00  90      


 


11/16/17 20:00  88      


 


11/16/17 19:45 97.9 88 16 118/71 (87) 100   


 


11/16/17 19:45     100 Room Air  


 


11/16/17 19:00  88      


 


11/16/17 18:00  90      


 


11/16/17 17:00  104      


 


11/16/17 16:59 98.2 101 18 110/76 (87) 99   


 


11/16/17 16:59      Room Air  


 


11/16/17 16:00  106      


 


11/16/17 15:00  109      


 


11/16/17 14:25  83      


 


11/16/17 14:00  86      


 


11/16/17 13:00  86      


 


11/16/17 12:14      Room Air  


 


11/16/17 12:00  86      


 


11/16/17 11:47 97.9 85 18 162/86 (111) 95   








Labs:





Laboratory Tests








Test


  11/17/17


04:14


 


White Blood Count


  3.1 TH/MM3


(4.0-11.0)


 


Red Blood Count


  2.73 MIL/MM3


(4.00-5.30)


 


Hemoglobin


  9.6 GM/DL


(11.6-15.3)


 


Hematocrit


  28.2 %


(35.0-46.0)


 


Mean Corpuscular Volume


  103.4 FL


(80.0-100.0)


 


Mean Corpuscular Hemoglobin


  35.3 PG


(27.0-34.0)


 


Mean Corpuscular Hemoglobin


Concent 34.1 %


(32.0-36.0)


 


Red Cell Distribution Width


  21.1 %


(11.6-17.2)


 


Platelet Count


  66 TH/MM3


(150-450)


 


Mean Platelet Volume


  10.5 FL


(7.0-11.0)


 


Neutrophils (%) (Auto)


  75.8 %


(16.0-70.0)


 


Lymphocytes (%) (Auto)


  15.1 %


(9.0-44.0)


 


Monocytes (%) (Auto)


  8.9 %


(0.0-8.0)


 


Eosinophils (%) (Auto)


  0.1 %


(0.0-4.0)


 


Basophils (%) (Auto)


  0.1 %


(0.0-2.0)


 


Neutrophils # (Auto)


  2.3 TH/MM3


(1.8-7.7)


 


Lymphocytes # (Auto)


  0.5 TH/MM3


(1.0-4.8)


 


Monocytes # (Auto)


  0.3 TH/MM3


(0-0.9)


 


Eosinophils # (Auto)


  0.0 TH/MM3


(0-0.4)


 


Basophils # (Auto)


  0.0 TH/MM3


(0-0.2)


 


CBC Comment AUTO DIFF 


 


Differential Comment


  AUTO DIFF


CONFIRMED


 


Platelet Estimate LOW (NORMAL) 


 


Platelet Morphology Comment


  NORMAL


(NORMAL)


 


Blood Urea Nitrogen


  11 MG/DL


(7-18)


 


Creatinine


  0.45 MG/DL


(0.50-1.00)


 


Random Glucose


  94 MG/DL


()


 


Total Protein


  4.9 GM/DL


(6.4-8.2)


 


Albumin


  2.4 GM/DL


(3.4-5.0)


 


Calcium Level


  7.9 MG/DL


(8.5-10.1)


 


Phosphorus Level


  2.6 MG/DL


(2.5-4.9)


 


Magnesium Level


  2.5 MG/DL


(1.5-2.5)


 


Alkaline Phosphatase


  178 U/L


()


 


Aspartate Amino Transf


(AST/SGOT) 50 U/L (15-37) 


 


 


Alanine Aminotransferase


(ALT/SGPT) 49 U/L (10-53) 


 


 


Total Bilirubin


  0.9 MG/DL


(0.2-1.0)


 


Sodium Level


  138 MEQ/L


(136-145)


 


Potassium Level


  3.5 MEQ/L


(3.5-5.1)


 


Chloride Level


  101 MEQ/L


()


 


Carbon Dioxide Level


  28.6 MEQ/L


(21.0-32.0)


 


Anion Gap 8 MEQ/L (5-15) 


 


Estimat Glomerular Filtration


Rate 144 ML/MIN


(>89)








Result Diagram:  


11/17/17 0414 11/17/17 0414








(1) COPD (chronic obstructive pulmonary disease)


Plan:  on nebs, steroids  





needs aggressive pulm toileting 





(2) Tobacco abuse


Plan:  smoking cessation





(3) Physical deconditioning


Plan:  pt / ot 7 days a week 





(4) Pleural effusion on left


Plan:  s/p left thoracoscopic exploration for loculated pleural effusion 


path negative for malignant cells 





cultures neg 





OOB PT/OT 





eval for rehab


chest tubes removed 


encourage pt to  ambulate more today





s/p Bronch 11/13





ok to transfer out of [facility]











 














(5) Pancytopenia


Plan:  appreciate hematology input 





--Pancytopenia with macrocytosis and normal B12.  


--suspect myelodysplastic syndrome until proven otherwise.


--will need bone marrow biopsy and aspirate. (declining at this time)


--iron studies are consistent with anemia of chronic disease 








--blood transfusion if the hemoglobin is less than 8 and platelet transfusion 

if the platelet count is less than 15 or any bleeding / per Heme 








Problem Qualifiers





(1) COPD (chronic obstructive pulmonary disease):  


Qualified Codes:  J43.1 - Panlobular emphysema








Terwilliger,Jacqueline R. ARNP Nov 17, 2017 11:53

## 2017-11-17 NOTE — HHI.PR
Subjective


Remarks


The patient is a 56-year-old female with past medical history of COPD, who 

presented to [facility] ED with complaints of coughing, wheezing. 

The patient states that she had food poisoning last week where she had nausea 

and diarrhea, after she ate suspicious food. She denies any worsening of 

dyspnea from baseline. In addition, she denies any constitutional symptoms. She 

quit smoking five and a half years ago and used to smoke one to two packs per 

day for about 30 years.  Chest x-ray in the ER showed consolidation with large 

effusion and volume loss on the left.  On further history she denies any 

hemoptysis, however, she reports 20 pounds weight loss in the last 4 months and 

decreased p.o. intake.  She is being followed up by Dr. Grey her outpatient 

pulmonologist.  The patient denies any use of oxygen at home, however, she is 

on bronchodilators p.r.n. Her laboratory data is significant for hyponatremia 

with sodium level 125, lactic acid level 2.1.  On arrival to the ER she was 

tachycardic and tachypneic.  When seen the patient is on 4 liters nasal cannula 

with saturation ranges between %.  In the ER she was given Rocephin, 

azithromycin, Solu-Medrol, bronchodilator treatment and IV fluids.  Her current 

blood pressure is 107/61.





10/27 Patient s/p CT guided thoracentesis and CT placement by IR yesterday. She 

is feeling better. 





Subjective:


10/28 She complains of pain and requests increased pain meds. Repeat imaging 

ordered per pulmonology to evaluate mass vs loculated effusion. On NC. 





[facility] RECONSULT NOTE 11/12/17


Critical care consulted for severe hypoxemic respiratory failure.  On 11/8/17 

patient had left thoracotomy for decortication, evacuation of complex loculated 

effusion, adhesiolysis by Dr. Rodriguez.  CXR 11/11/17 showed near complete 

whiteout of the left lung-pulmonary Dr. Khalil is following.  Today a HALICAT 

was called as the patient was found profoundly hypoxemic in the 70s oxygen 

saturation.  She was placed on 100% percent nonrebreather with improvement in 

saturation only to 81%.  Stat ABG on 100% nonrebreather showed pH of 7.29 PCO2 

of 62 pO2 of 53 and oxygen saturation 79%.  Stat chest x-ray showed complete 

whiteout of the left lung.  I discussed with the patient briefly, she is 

agreeable for intubation.  I proceeded with endotracheal intubation and placed 

on mechanical ventilation -patient saturation gradually improved to 100%.  

Discussed with pulmonology Dr. Khalil.  He plans to do bronchoscopy today 

afternoon. 





SUBJ 11/13: Patient had bronchoscopy by Dr. Khalil yesterday.  Chest x-ray 

today shows improvement in aeration but still with volume loss left lower lung 

fields which is unchanged from the post thoracotomy chest x-rays.  Wide-awake 

on vent tolerating CPAP will proceed with weaning trials





11-14 TRANSFERRED BACK TO OUR SERVICE


EXTUBATED YESTERDAY


GETTING BLOOD 1 UNIT PRBC TODAY


PT AND OT


DW RN AND PT


LESS SOB THAN YESTERDAY








11-15 REFUSING BONE MARROW BIOPSY


NO BLOOD TRANSFUSION TODAY


NEED PT AND OT


BECOMING MOBILE


HAD CHEST TUBES OUT


AM LABS


HAD BM


DW RN AND PT





11-16 still having urinary retention


WILL NEED NAVARRETE CATHETER SINCE NOT URINATING ON HER OWN IF NOT URINATING AFTER 

LASIX


DW RN AND PT


LOTS OF DRAINAGE FROM LEFT CHEST WOUND AREA


NO SOB


HAD GOOD BM TODAY


DW RN AND PT





11-17 still needing URINARY RETENTION


STILL NEEDING NAVARRETE


STILL LOTS OF DRAINAGE FROM LEFT CHEST WOUND AREA


NO SOB, NO CHEST PAIN


DW RN AND PT





Objective


Vitals





Vital Signs








  Date Time  Temp Pulse Resp B/P (MAP) Pulse Ox O2 Delivery O2 Flow Rate FiO2


 


11/17/17 12:00  86      


 


11/17/17 11:43     98 Room Air  


 


11/17/17 11:43 97.8 98 21 130/75 (93) 98   


 


11/17/17 11:00  85      


 


11/17/17 10:00  101      


 


11/17/17 09:00  93      


 


11/17/17 08:00  85      


 


11/17/17 07:23 97.7 87 27 123/64 (83) 98   


 


11/17/17 07:23     98 Room Air  


 


11/17/17 07:00  84      


 


11/17/17 06:02  91      


 


11/17/17 05:02  92      


 


11/17/17 04:00  84      


 


11/17/17 03:31      Room Air  


 


11/17/17 03:29 97.5 86 16 127/73 (91) 97   


 


11/17/17 03:00  88      


 


11/17/17 02:12  83      


 


11/17/17 01:00  94      


 


11/17/17 00:00  90      


 


11/16/17 23:40     98 Room Air  


 


11/16/17 23:40 97.6 95 20 126/65 (85) 98   


 


11/16/17 23:00  93      


 


11/16/17 22:00  94      


 


11/16/17 21:00  90      


 


11/16/17 20:00  88      


 


11/16/17 19:45 97.9 88 16 118/71 (87) 100   


 


11/16/17 19:45     100 Room Air  


 


11/16/17 19:00  88      


 


11/16/17 18:00  90      


 


11/16/17 17:00  104      


 


11/16/17 16:59 98.2 101 18 110/76 (87) 99   


 


11/16/17 16:59      Room Air  


 


11/16/17 16:00  106      


 


11/16/17 15:00  109      


 


11/16/17 14:25  83      


 


11/16/17 14:00  86      














I/O      


 


 11/16/17 11/16/17 11/16/17 11/17/17 11/17/17 11/17/17





 07:00 15:00 23:00 07:00 15:00 23:00


 


Intake Total 960 ml  820 ml 480 ml  


 


Output Total 1650 ml  1350 ml 300 ml  


 


Balance -690 ml  -530 ml 180 ml  


 


      


 


Intake Oral 960 ml  720 ml 480 ml  


 


IV Total   100 ml   


 


Output Urine Total 1650 ml  1350 ml 300 ml  


 


Bladder Scan Volume Amount 669 ml     





 669 ml     





 669 ml     


 


# Bowel Movements   1   








Result Diagram:  


11/17/17 0414                                                                  

              11/17/17 0414





Other Results





 Laboratory Tests








Test


  11/15/17


04:37 11/15/17


14:00 11/16/17


05:20 11/17/17


04:14


 


White Blood Count 3.4 TH/MM3   4.3 TH/MM3  3.1 TH/MM3 


 


Red Blood Count 2.64 MIL/MM3   2.90 MIL/MM3  2.73 MIL/MM3 


 


Hemoglobin 8.9 GM/DL   10.1 GM/DL  9.6 GM/DL 


 


Hematocrit 26.5 %   29.3 %  28.2 % 


 


Mean Corpuscular Volume 100.6 FL   101.0 FL  103.4 FL 


 


Mean Corpuscular Hemoglobin 33.9 PG   34.9 PG  35.3 PG 


 


Mean Corpuscular Hemoglobin


Concent 33.7 % 


  


  34.6 % 


  34.1 % 


 


 


Red Cell Distribution Width 20.9 %   21.2 %  21.1 % 


 


Platelet Count 48 TH/MM3   67 TH/MM3  66 TH/MM3 


 


Mean Platelet Volume 9.5 FL   9.7 FL  10.5 FL 


 


Neutrophils (%) (Auto) 86.6 %   82.9 %  75.8 % 


 


Lymphocytes (%) (Auto) 8.0 %   9.4 %  15.1 % 


 


Monocytes (%) (Auto) 5.3 %   7.6 %  8.9 % 


 


Eosinophils (%) (Auto) 0.0 %   0.0 %  0.1 % 


 


Basophils (%) (Auto) 0.1 %   0.1 %  0.1 % 


 


Neutrophils # (Auto) 2.9 TH/MM3   3.6 TH/MM3  2.3 TH/MM3 


 


Lymphocytes # (Auto) 0.3 TH/MM3   0.4 TH/MM3  0.5 TH/MM3 


 


Monocytes # (Auto) 0.2 TH/MM3   0.3 TH/MM3  0.3 TH/MM3 


 


Eosinophils # (Auto) 0.0 TH/MM3   0.0 TH/MM3  0.0 TH/MM3 


 


Basophils # (Auto) 0.0 TH/MM3   0.0 TH/MM3  0.0 TH/MM3 


 


CBC Comment AUTO DIFF   AUTO DIFF  AUTO DIFF 


 


Differential Comment


  AUTO DIFF


CONFIRMED 


  AUTO DIFF


CONFIRMED AUTO DIFF


CONFIRMED


 


Platelet Estimate LOW   LOW  LOW 


 


Platelet Morphology Comment NORMAL   NORMAL  NORMAL 


 


Blood Urea Nitrogen 8 MG/DL   10 MG/DL  11 MG/DL 


 


Creatinine 0.35 MG/DL   0.52 MG/DL  0.45 MG/DL 


 


Random Glucose 138 MG/DL   121 MG/DL  94 MG/DL 


 


Total Protein 4.9 GM/DL   5.2 GM/DL  4.9 GM/DL 


 


Albumin 2.4 GM/DL   2.6 GM/DL  2.4 GM/DL 


 


Calcium Level 8.1 MG/DL   8.1 MG/DL  7.9 MG/DL 


 


Phosphorus Level 1.8 MG/DL   2.2 MG/DL  2.6 MG/DL 


 


Magnesium Level 2.1 MG/DL   2.0 MG/DL  2.5 MG/DL 


 


Alkaline Phosphatase 162 U/L   174 U/L  178 U/L 


 


Aspartate Amino Transf


(AST/SGOT) 24 U/L 


  


  38 U/L 


  50 U/L 


 


 


Alanine Aminotransferase


(ALT/SGPT) 29 U/L 


  


  41 U/L 


  49 U/L 


 


 


Total Bilirubin 0.8 MG/DL   0.7 MG/DL  0.9 MG/DL 


 


Sodium Level 139 MEQ/L   140 MEQ/L  138 MEQ/L 


 


Potassium Level 3.1 MEQ/L  3.9 MEQ/L  3.9 MEQ/L  3.5 MEQ/L 


 


Chloride Level 102 MEQ/L   102 MEQ/L  101 MEQ/L 


 


Carbon Dioxide Level 29.5 MEQ/L   30.9 MEQ/L  28.6 MEQ/L 


 


Anion Gap 8 MEQ/L   7 MEQ/L  8 MEQ/L 


 


Estimat Glomerular Filtration


Rate 193 ML/MIN 


  


  122 ML/MIN 


  144 ML/MIN 


 


 


Hemoglobin A1c 4.9 %    


 


Free Thyroxine 0.98 NG/DL    


 


Thyroid Stimulating Hormone


3rd Gen 1.590 uIU/ML 


  


  


  


 








Imaging





Last Impressions








Chest X-Ray 11/16/17 0600 Signed





Impressions: 





 Service Date/Time:  Thursday, November 16, 2017 03:46 - CONCLUSION:  Stable 





 examination. Residual pleural effusion on the left and residual pleural air 





 similar to the previous study.       Ernesto Wyman MD 


 


Abdomen X-Ray 11/12/17 1820 Signed





Impressions: 





 Service Date/Time:  Sunday, November 12, 2017 18:30 - CONCLUSION:  1. No 

obvious 





 obstruction or pneumoperitoneum on this limited view of the abdomen. 2. 





 Nasogastric tube with the tip projecting over the proximal gastric body. 3. 





 Volume loss in the left hemithorax with stable position of 2 thoracostomy 

tubes 





 and left basilar consolidation. .     Chris Kumari MD 


 


Chest CT 10/28/17 0000 Signed





Impressions: 





 Service Date/Time:  Saturday, October 28, 2017 14:44 - CONCLUSION:  1. 

Interval 





 placement of left-sided chest tube with slight decrease in the complex 





 low-density fluid collection in the lower left hemithorax. 2. Volume loss 





 remains in the left hemithorax with consolidation in the left infrahilar 

region 





 3. New small right pleural effusion and consolidation in the right posterior 





 lower lobe 4. The previously noted fluid and debris in the left mainstem 





 bronchus is no longer present.     Ibrahima Mccarthy MD 


 


Chest Ultrasound 10/27/17 0000 Signed





Impressions: 





 Service Date/Time:  Friday, October 27, 2017 09:59 - CONCLUSION:  No focal 





 collections of anechoic free fluid.  Prominent amount of heterogeneous 





 echotexture material surrounding the left lower chest.     Efrain Samuels MD 


 


Chest Tube Insertion 10/26/17 1224 Signed





Impressions: 





 Service Date/Time:  Thursday, October 26, 2017 13:22 - CONCLUSION: 

Uncomplicated 





 CT-guided thoracentesis.     Jorge Carrillo MD 








Objective Remarks


GENERAL: Awake alert oriented 3 --talkative and cooperative


SKIN: Warm and dry.  No obvious rashes


HEAD: Atraumatic. Normocephalic. 


EYES: Pupils equal and round. No scleral icterus. No injection or drainage.  

Extraocular muscles intact


ENT: No nasal bleeding or discharge.  Mucous membranes pink and moist.  Tongue 

is midline


NECK: Trachea midline. No JVD.  Supple


CARDIOVASCULAR: Regular rate and rhythm.  S1-S2 no S3 or S4 no heave or thrill 

or rub


RESPIRATORY: No accessory muscle use.  Breath sounds equal bilaterally.  Coarse 

breath sounds bilaterally


GASTROINTESTINAL: Abdomen soft, non-tender, nondistended. Hepatic and splenic 

margins not palpable. 


MUSCULOSKELETAL: Extremities without clubbing, cyanosis, or edema. No obvious 

deformities.  Bilateral lower extremity weakness


NEUROLOGICAL: Awake and alert. No obvious cranial nerve deficits.  Motor 

grossly within normal limits.  4 out of 5  muscle strength in the arms and 4 

out of 5 muscle strength in the legs.  Normal speech.


PSYCHIATRIC: Appropriate mood and affect; insight and judgment ABnormal.





NAVARRETE CATHETER IN PLACE


Procedures


CT guided thoracentesis with L chest tube placement 10/26


Left thoracoscopic exploration, left thoracotomy for decortication, evacuation 

of complex loculated effusion, adhesiolysis 11/8/17 11-12 INTUBATED


11-13 EXTUBATED


11-13 BRONCHOSCOPY WITH BAL OF LEFT SIDE


Medications and IVs





Current Medications


Sodium Chloride (NS Flush) 2 ml UNSCH  PRN IVF FLUSH AFTER USING IV ACCESS Last 

administered on 11/5/17at 21:04;  Start 10/26/17 at 10:15;  Stop 11/7/17 at 17:

47;  Status DC


Methylprednisolone Sodium Succinate (SoluMEDROL INJ) 125 mg ONCE  ONCE IV PUSH  

Last administered on 10/26/17at 10:17;  Start 10/26/17 at 10:15;  Stop 10/26/17 

at 10:16;  Status DC


Albuterol/ Ipratropium (Duoneb Neb) 1 ampule ONCE  ONCE INH  Last administered 

on 10/26/17at 10:13;  Start 10/26/17 at 10:15;  Stop 10/26/17 at 10:16;  Status 

DC


Albuterol Sulfate (Albuterol Neb) 2.5 mg Q15M INH  Last administered on 10/26/

17at 10:13;  Start 10/26/17 at 10:15;  Stop 10/26/17 at 10:31;  Status DC


Sodium Chloride 1,000 ml @  125 mls/hr Q8H IV  Last administered on 10/26/17at 

10:17;  Start 10/26/17 at 10:15;  Stop 10/26/17 at 11:35;  Status DC


Ondansetron HCl (Zofran Inj) 4 mg ONCE  ONCE IV PUSH  Last administered on 10/26

/17at 10:41;  Start 10/26/17 at 10:45;  Stop 10/26/17 at 10:46;  Status DC


Ceftriaxone Sodium 1000 mg/ Sodium Chloride 100 ml @  200 mls/hr ONCE  ONCE IV  

Last administered on 10/26/17at 11:54;  Start 10/26/17 at 11:15;  Stop 10/26/17 

at 11:44;  Status DC


Azithromycin 500 mg/Sodium Chloride 250 ml @  250 mls/hr ONCE  ONCE IV  Last 

administered on 10/26/17at 12:31;  Start 10/26/17 at 11:15;  Stop 10/26/17 at 12

:14;  Status DC


Pantoprazole Sodium (Protonix Inj) 40 mg DAILY IV PUSH  Last administered on 10/

28/17at 08:54;  Start 10/26/17 at 12:00;  Stop 10/28/17 at 14:05;  Status DC


Albuterol/ Ipratropium (Duoneb Neb) 1 ampule Q4HR  NEB INH  Last administered 

on 10/30/17at 07:58;  Start 10/26/17 at 12:00;  Stop 10/30/17 at 11:59;  Status 

DC


Albuterol/ Ipratropium (Duoneb Neb) 1 ampule Q2HR NEB  PRN INH WHEEZING Last 

administered on 11/1/17at 10:14;  Start 10/26/17 at 11:30;  Stop 11/8/17 at 20:

29;  Status DC


Miscellaneous Information 1 [id number]  Last administered on 10/26/17at 21:22;  

Start 10/26/17 at 11:30;  Stop 11/4/17 at 23:35;  Status DC


Chlorhexidine Gluconate (Chlorhexidine 2% Cloth) Taper DAILY@04 TOP  Last 

administered on 10/29/17at 04:00;  Start 10/27/17 at 04:00;  Stop 11/4/17 at 23:

35;  Status DC


Chlorhexidine Gluconate (Chlorhexidine 2% Cloth) 3 pack UNSCH  PRN TOP HYGIENIC 

CARE;  Start 10/26/17 at 11:30;  Stop 11/4/17 at 23:35;  Status DC


Senna/Docusate Sodium (Jess-Colace) 1 tab BID PO  Last administered on 11/8/ 17at 08:44;  Start 10/26/17 at 21:00;  Stop 11/8/17 at 20:29;  Status DC


Magnesium Hydroxide (Milk Of Magnesia Liq) 30 ml Q12H  PRN PO Mild constipation 

Last administered on 11/6/17at 20:33;  Start 10/26/17 at 11:30;  Stop 11/8/17 

at 20:29;  Status DC


Sennosides (Senokot) 17.2 mg Q12H  PRN PO Moderate constipation Last 

administered on 11/14/17at 02:19;  Start 10/26/17 at 11:30


Bisacodyl (Dulcolax Supp) 10 mg DAILY  PRN RECTAL SEVERE CONSITIPATION;  Start 

10/26/17 at 11:30;  Stop 11/14/17 at 14:59;  Status DC


Lactulose (Lactulose Liq) 30 ml DAILY  PRN PO SEVERE CONSITIPATION;  Start 10/26

/17 at 11:30;  Status Cancel


Sodium Chloride 1,000 ml @  84 mls/hr [id number] IV  Last administered on 10/26/

17at 17:26;  Start 10/26/17 at 11:30;  Stop 10/27/17 at 08:37;  Status DC


Potassium Chloride 100 ml @  50 mls/hr Q2H  PRN IV For Potassium 2.8 - 3.2 mEq/L

;  Start 10/26/17 at 11:30;  Stop 10/28/17 at 14:05;  Status DC


Potassium Chloride 100 ml @  50 mls/hr Q2H  PRN IV For Potassium 2.8 - 3.2 mEq/L

;  Start 10/26/17 at 11:30;  Stop 10/28/17 at 14:05;  Status DC


Potassium Bicarb/ Potassium Chloride (K-Lyte Cl  Eff) 50 meq UNSCH  PRN PO For 

Potassium 3.3 - 3.5 mEq/L;  Start 10/26/17 at 11:30;  Stop 10/28/17 at 14:05;  

Status DC


Potassium Chloride 100 ml @  25 mls/hr UNSCH  PRN IV For Potassium 3.3 - 3.5 mEq

/L;  Start 10/26/17 at 11:30;  Stop 10/28/17 at 14:05;  Status DC


Potassium Chloride 100 ml @  50 mls/hr Q2H  PRN IV For Potassium 3.3 - 3.5 mEq/L

;  Start 10/26/17 at 11:30;  Stop 10/28/17 at 14:05;  Status DC


Magnesium Sulfate 4 gm/Sodium Chloride 100 ml @  50 mls/hr UNSCH  PRN IV For 

Magnesium 0.9 - 1.1 mg/dL;  Start 10/26/17 at 11:30;  Stop 10/28/17 at 14:05;  

Status DC


Magnesium Oxide (Mag-Ox) 800 mg UNSCH  PRN PO For Magnesium 1.2 - 1.6 mg/dL;  

Start 10/26/17 at 11:30;  Stop 10/28/17 at 14:05;  Status DC


Magnesium Sulfate 2 gm/Sodium Chloride 100 ml @  50 mls/hr UNSCH  PRN IV For 

Magnesium 1.2 - 1.6 mg/dL;  Start 10/26/17 at 11:30;  Stop 10/28/17 at 14:05;  

Status DC


Potassium Phosphate (K-Phos) 2,000 mg Q4H  PRN PO For Phosphorus < 2.5 mg/dL;  

Start 10/26/17 at 11:30;  Stop 10/28/17 at 14:05;  Status DC


Sodium Phosphate 30 mmol/Sodium Chloride 250 ml @  42 mls/hr UNSCH  PRN IV For 

Phosphorus < 2.5 mg/dL;  Start 10/26/17 at 11:30;  Stop 10/28/17 at 14:05;  

Status DC


Potassium Phosphate (K-Phos) 2,000 mg UNSCH  PRN PO/TUBE SEE LABEL COMMENTS;  

Start 10/26/17 at 11:30;  Stop 10/28/17 at 14:05;  Status DC


Potassium Phosphate 30 mmol/ Sodium Chloride 260 ml @  42 mls/hr UNSCH  PRN IV 

SEE LABEL COMMENTS Last administered on 10/27/17at 11:46;  Start 10/26/17 at 11:

30;  Stop 10/28/17 at 14:05;  Status DC


Dextrose (D50w (Vial) Inj) 50 ml UNSCH  PRN IV PUSH HYPOGLYCEMIA-SEE COMMENTS;  

Start 10/26/17 at 11:30;  Stop 11/4/17 at 23:34;  Status DC


Glucagon (Glucagon Inj) 1 mg UNSCH  PRN OTHER HYPOGLYCEMIA-SEE COMMENTS;  Start 

10/26/17 at 11:30;  Stop 11/4/17 at 23:34;  Status DC


Insulin Human Regular (NovoLIN R SUPPLEMENTAL SCALE) 1 Q6H SQ  Last 

administered on 10/29/17at 17:30;  Start 10/26/17 at 11:30;  Stop 11/4/17 at 23:

34;  Status DC


Ceftriaxone Sodium 1000 mg/ Sodium Chloride 100 ml @  200 mls/hr Q24H IV  Last 

administered on 10/31/17at 12:20;  Start 10/27/17 at 12:00;  Stop 11/1/17 at 11:

59;  Status DC


Azithromycin 500 mg/Sodium Chloride 250 ml @  250 mls/hr Q24H IV  Last 

administered on 10/28/17at 11:48;  Start 10/27/17 at 13:00;  Stop 10/28/17 at 15

:20;  Status DC


Methylprednisolone Sodium Succinate (SoluMEDROL INJ) 60 mg Q6HR IV PUSH  Last 

administered on 10/27/17at 12:31;  Start 10/26/17 at 16:00;  Stop 10/27/17 at 13

:03;  Status DC


Lidocaine/ Epinephrine (Xylocaine-Epi 1%-1:100,000 Inj) 20 ml STK-MED ONCE 

.ROUTE  Last administered on 10/26/17at 12:38;  Start 10/26/17 at 12:38;  Stop 

10/26/17 at 12:39;  Status DC


Fentanyl Citrate (fentaNYL INJ) 100 mcg STK-MED ONCE .ROUTE  Last administered 

on 10/26/17at 12:47;  Start 10/26/17 at 12:47;  Stop 10/26/17 at 12:48;  Status 

DC


Midazolam HCl (Versed Inj) 2 mg STK-MED ONCE .ROUTE  Last administered on 10/26/

17at 12:47;  Start 10/26/17 at 12:47;  Stop 10/26/17 at 12:48;  Status DC


Iohexol (Omnipaque 350 Inj) 69 ml STK-MED ONCE IVCONTRAST  Last administered on 

10/26/17at 13:33;  Start 10/26/17 at 13:33;  Stop 10/26/17 at 13:34;  Status DC


Ephedrine Sulfate (ePHEDrine/NS 25 MG/5 ML SYR) 25 mg STK-MED ONCE .ROUTE ;  

Start 10/26/17 at 13:40;  Stop 10/26/17 at 13:41;  Status DC


Acetaminophen (Tylenol) 650 mg Q6H  PRN PO PAIN Last administered on 10/27/17at 

04:13;  Start 10/27/17 at 04:15;  Stop 10/30/17 at 11:16;  Status DC


Oxycodone/ Acetaminophen (Percocet  5-325 Mg) 1 tab ONCE  ONCE PO  Last 

administered on 10/27/17at 09:39;  Start 10/27/17 at 09:30;  Stop 10/27/17 at 09

:31;  Status DC


Methylprednisolone Sodium Succinate (SoluMEDROL INJ) 40 mg Q6HR IV PUSH  Last 

administered on 10/29/17at 05:12;  Start 10/27/17 at 18:00;  Stop 10/29/17 at 11

:40;  Status DC


Morphine Sulfate (Morphine Inj) 1 mg ONCE  ONCE IV PUSH  Last administered on 10

/27/17at 15:06;  Start 10/27/17 at 15:00;  Stop 10/27/17 at 15:01;  Status DC


Acetaminophen (Tylenol) 650 mg Q6H  PRN PO PAIN 1-2;  Start 10/27/17 at 22:30


Morphine Sulfate (Morphine Inj) 1 mg Q4H  PRN IV PAIN 5-10 Last administered on 

10/28/17at 11:12;  Start 10/27/17 at 22:30;  Stop 10/28/17 at 13:46;  Status DC


Morphine Sulfate (Morphine Inj) 2 mg Q4H  PRN IV BREAKTHROUGH PAIN 6-10 Last 

administered on 11/8/17at 13:33;  Start 10/28/17 at 14:30;  Stop 11/8/17 at 19:

27;  Status DC


Acetaminophen/ Hydrocodone Bitart (Norco  5-325 Mg) 1 tab Q6H  PRN PO PAIN 3-5 

Last administered on 10/30/17at 05:18;  Start 10/28/17 at 13:45;  Stop 11/6/17 

at 09:01;  Status DC


Acetaminophen/ Hydrocodone Bitart (Norco  5-325 Mg) 2 tab Q6H  PRN PO PAIN 6-10 

Last administered on 11/6/17at 08:39;  Start 10/28/17 at 13:45;  Stop 11/6/17 

at 09:01;  Status DC


Pantoprazole Sodium (Protonix) 40 mg DAILY PO  Last administered on 11/8/17at 08

:41;  Start 10/29/17 at 09:00;  Stop 11/8/17 at 20:29;  Status DC


Iohexol (Omnipaque 350 Inj) 70 ml STK-MED ONCE IVCONTRAST  Last administered on 

10/28/17at 14:59;  Start 10/28/17 at 14:59;  Stop 10/28/17 at 15:00;  Status DC


Azithromycin 500 mg/Sodium Chloride 250 ml @  250 mls/hr Q24H IV  Last 

administered on 11/1/17at 11:57;  Start 10/30/17 at 13:00;  Stop 11/1/17 at 14:

46;  Status DC


Methylprednisolone Sodium Succinate (SoluMEDROL INJ) 40 mg Q12HR IV PUSH  Last 

administered on 10/31/17at 07:37;  Start 10/29/17 at 21:00;  Stop 10/31/17 at 13

:51;  Status DC


Methylprednisolone Sodium Succinate (SoluMEDROL INJ) 40 mg DAILY IV PUSH  Last 

administered on 11/2/17at 08:15;  Start 11/1/17 at 09:00;  Stop 11/2/17 at 18:19

;  Status DC


Nifedipine (Procardia Xl) 60 mg ONCE  ONCE PO  Last administered on 11/2/17at 17

:49;  Start 11/2/17 at 17:30;  Stop 11/2/17 at 17:32;  Status DC


Nifedipine (Procardia Xl) 30 mg DAILY PO  Last administered on 11/14/17at 08:32

;  Start 11/3/17 at 09:00;  Status Future Hold


Furosemide (Lasix) 20 mg DAILY PO  Last administered on 11/12/17at 09:21;  

Start 11/2/17 at 17:35;  Status Future Hold


Prednisone (Deltasone) 10 mg DAILY PO  Last administered on 11/12/17at 09:21;  

Start 11/5/17 at 09:00;  Stop 11/14/17 at 14:59;  Status DC


Acetaminophen/ Hydrocodone Bitart (Norco  5-325 Mg) 1 tab Q4H  PRN PO PAIN 3-5;

  Start 11/6/17 at 09:00;  Stop 11/8/17 at 19:27;  Status DC


Acetaminophen/ Hydrocodone Bitart (Norco  5-325 Mg) 2 tab Q4H  PRN PO PAIN 6-10 

Last administered on 11/8/17at 08:47;  Start 11/6/17 at 09:15;  Stop 11/8/17 at 

19:27;  Status DC


Lactulose (Lactulose Liq) 30 ml ONCE  ONCE PO ;  Start 11/7/17 at 13:00;  Stop 

11/7/17 at 13:19;  Status DC


Lactulose (Lactulose Liq) 30 ml QID  PRN PO severe constipation  Last 

administered on 11/8/17at 08:42;  Start 11/7/17 at 13:00


Simethicone (Phazyme Chew) 125 mg ONCE  ONCE PO  Last administered on 11/7/17at 

14:59;  Start 11/7/17 at 13:30;  Stop 11/7/17 at 13:31;  Status DC


Sodium Chloride (NS Flush) 2 ml BID IV FLUSH  Last administered on 11/8/17at 09:

00;  Start 11/7/17 at 21:00;  Stop 11/8/17 at 20:29;  Status DC


Sodium Chloride (NS Flush) 2 ml UNSCH  PRN IV FLUSH FLUSH AFTER USING IV ACCESS

;  Start 11/7/17 at 16:15;  Stop 11/8/17 at 20:29;  Status DC


Cefazolin Sodium 500 mg/Sodium Chloride 505 ml @ 0 mls/hr ON  CALL IRRIGATION ;

  Start 11/7/17 at 16:15;  Stop 11/14/17 at 14:59;  Status DC


Cefazolin Sodium/ Dextrose 50 ml @  150 mls/hr ON  CALL IV  Last administered 

on 11/8/17at 16:17;  Start 11/7/17 at 16:15;  Stop 11/14/17 at 14:59;  Status DC


Chlorhexidine Gluconate (Hibiclens 4% Top Soln) 1 applic ON  CALL TOPICAL  Last 

administered on 11/8/17at 08:42;  Start 11/7/17 at 16:15;  Stop 11/9/17 at 08:40

;  Status DC


Dextrose (D50w (Vial) Inj) 50 ml UNSCH  PRN IV PUSH HYPOGLYCEMIA-SEE COMMENTS;  

Start 11/7/17 at 16:15


Lactated Ringer's 1,000 ml @  30 mls/hr Q24H PRN IV SEE LABEL COMMENTS Last 

administered on 11/8/17at 14:53;  Start 11/7/17 at 18:30;  Stop 11/8/17 at 20:23

;  Status DC


Sodium Chloride 500 ml @  30 mls/hr [id number] PRN IV SEE LABEL COMMENTS;  Start 11 /7/17 at 18:30;  Stop 11/8/17 at 20:23;  Status DC


Metoprolol Tartrate (Lopressor) 25 mg ON CALL  PRN PO SEE LABEL COMMENTS;  

Start 11/7/17 at 18:30;  Stop 11/8/17 at 20:23;  Status DC


Povidone Iodine (Betadine 5% Antisepsis Kit) 1 applic ON CALL  PRN EACH NARE 

SEE LABEL COMMENTS;  Start 11/7/17 at 18:30;  Stop 11/8/17 at 20:23;  Status DC


Chlorhexidine Gluconate (Chlorhexidine 2% Cloth) 3 pack ON CALL  PRN TOPICAL 

SEE LABEL COMMENTS;  Start 11/7/17 at 18:30;  Stop 11/9/17 at 08:40;  Status DC


Insulin Human Regular (NovoLIN R INJ) See Protocol Table ... ON CALL  PRN SQ 

SEE PROTOCOL TABLE;  Start 11/7/17 at 18:30;  Stop 11/8/17 at 20:23;  Status DC


Albuterol/ Ipratropium (Duoneb Neb) 1 ampule Q6HR  NEB NEB  Last administered 

on 11/8/17at 09:40;  Start 11/7/17 at 22:00;  Stop 11/8/17 at 20:29;  Status DC


Bupivacaine HCl (Marcaine Pf 0.5% Inj) 30 ml ONCE  ONCE INFIL  Last 

administered on 11/8/17at 17:51;  Start 11/8/17 at 17:51;  Stop 11/8/17 at 17:53

;  Status DC


Albuterol Sulfate (Albuterol Neb) 2.5 mg Q6HR  NEB NEB  Last administered on 11/

10/17at 07:08;  Start 11/8/17 at 22:00;  Stop 11/10/17 at 12:20;  Status DC


Albuterol Sulfate (Albuterol Neb) 2.5 mg Q2HR NEB  PRN NEB WHEEZING Last 

administered on 11/17/17at 10:12;  Start 11/8/17 at 19:30


IV Flush (NS Flush) 2 ml BID IV FLUSH  Last administered on 11/17/17at 09:00;  

Start 11/8/17 at 21:00


IV Flush (NS Flush) 2 ml UNSCH  PRN IV FLUSH FLUSH AFTER USING IV ACCESS;  

Start 11/8/17 at 19:30


Miscellaneous Information (Post-op Orders (for Pharmacy))  STAT  ONCE OTHER ;  

Start 11/8/17 at 19:30;  Stop 11/8/17 at 20:27;  Status DC


Pantoprazole Sodium (Protonix) 40 mg HS PO  Last administered on 11/16/17at 21:

57;  Start 11/8/17 at 21:00


Ondansetron HCl (Zofran Inj) 4 mg Q6H  PRN IV PUSH NAUSEA OR VOMITING Last 

administered on 11/12/17at 05:58;  Start 11/8/17 at 19:30


Docusate Calcium (Surfak) 240 mg HS PO  Last administered on 11/8/17at 23:35;  

Start 11/8/17 at 21:00;  Stop 11/9/17 at 08:40;  Status DC


Magnesium Hydroxide (Milk Of Magnesia Liq) 30 ml DAILY  PRN PO MILD CONSTIPATION

;  Start 11/8/17 at 19:30


Acetaminophen 100 ml @  400 mls/hr Q6H IV  Last administered on 11/9/17at 19:24

;  Start 11/8/17 at 21:00;  Stop 11/9/17 at 15:14;  Status DC


Naloxone HCl (Narcan Inj) 0.4 mg UNSCH  PRN IV PUSH RESPIRATORY RATE LESS THAN 

10;  Start 11/8/17 at 19:30;  Stop 11/13/17 at 18:00;  Status DC


Morphine Sulfate (Morphine 1 Mg/ ml PCA) 30 mg UNSCH IV  Last administered on 11 /12/17at 09:29;  Start 11/8/17 at 19:30;  Stop 11/13/17 at 18:00;  Status DC


PCA Dosage Infused (Pha) 1 Q8HR .XX  Last administered on 11/12/17at 06:00;  

Start 11/8/17 at 22:00;  Stop 11/13/17 at 18:00;  Status DC


Morphine Sulfate (*morphine INJ PERIprocedure ONLY) 8 mg STK-MED ONCE .ROUTE  

Last administered on 11/8/17at 20:10;  Start 11/8/17 at 20:08;  Stop 11/9/17 at 

08:40;  Status DC


Morphine Sulfate (*morphine INJ PERIprocedure ONLY) 8 mg STK-MED ONCE .ROUTE  

Last administered on 11/8/17at 20:30;  Start 11/8/17 at 20:22;  Stop 11/9/17 at 

08:40;  Status DC


Meperidine HCl (*DEMEROL INJ PERIprocedural ONLY) 25 mg STK-MED ONCE .ROUTE  

Last administered on 11/8/17at 20:23;  Start 11/8/17 at 20:22;  Stop 11/9/17 at 

08:40;  Status DC


Morphine Sulfate (Morphine 1 Mg/ ml PCA) 30 mg STK-MED ONCE .ROUTE ;  Start 11/8 /17 at 20:25;  Stop 11/8/17 at 20:26;  Status DC


Sodium Chloride 1,000 ml @  30 mls/hr Q24H IV  Last administered on 11/11/17at 

21:00;  Start 11/8/17 at 21:00


Miscellaneous Information ALL NURSING DEPARTME... UNSCH  PRN .XX SEE LABEL 

COMMENTS;  Start 11/8/17 at 19:55;  Stop 11/9/17 at 19:54;  Status DC


Polyethylene Glycol (Miralax) 17 gm DAILY PO  Last administered on 11/17/17at 09

:16;  Start 11/9/17 at 09:00


Potassium Chloride (KCl) 30 meq ONCE  ONCE PO  Last administered on 11/9/17at 10

:07;  Start 11/9/17 at 08:45;  Stop 11/9/17 at 08:46;  Status DC


Potassium Chloride (KCl) 10 meq DAILY PO  Last administered on 11/15/17at 09:00

;  Start 11/9/17 at 09:00;  Stop 11/16/17 at 08:51;  Status DC


Cefazolin Sodium 1000 mg/Sodium Chloride 100 ml @  200 mls/hr Q8H IV ;  Start 11 /9/17 at 15:45;  Stop 11/9/17 at 15:45;  Status DC


Cefazolin Sodium 1000 mg/Sodium Chloride 100 ml @  200 mls/hr Q8H IV  Last 

administered on 11/10/17at 08:04;  Start 11/9/17 at 16:00;  Stop 11/10/17 at 08:

19;  Status DC


Ceftriaxone Sodium 1000 mg/ Sodium Chloride 100 ml @  200 mls/hr Q24H IV  Last 

administered on 11/16/17at 00:26;  Start 11/9/17 at 23:00;  Stop 11/16/17 at 18:

02;  Status DC


Furosemide (Lasix Inj) 20 mg ONCE  ONCE IV PUSH  Last administered on 11/10/

17at 13:08;  Start 11/10/17 at 14:00;  Stop 11/10/17 at 14:01;  Status DC


Potassium Chloride (KCl) 20 meq ONCE  ONCE PO  Last administered on 11/10/17at 

13:07;  Start 11/10/17 at 14:00;  Stop 11/10/17 at 14:01;  Status DC


Albuterol Sulfate (Albuterol Neb) 2.5 mg Q6HR WHILE AWAKE  NEB INH  Last 

administered on 11/12/17at 09:42;  Start 11/10/17 at 14:00;  Stop 11/12/17 at 10

:45;  Status DC


Furosemide (Lasix Inj) 20 mg ONCE  ONCE IV PUSH  Last administered on 11/10/

17at 18:49;  Start 11/10/17 at 17:00;  Stop 11/10/17 at 17:01;  Status DC


Potassium Chloride 100 ml @  50 mls/hr Q2H IV  Last administered on 11/11/17at 

11:26;  Start 11/11/17 at 08:00;  Stop 11/11/17 at 11:59;  Status DC


Etomidate (Amidate Inj) 40 mg STK-MED ONCE .ROUTE  Last administered on 11/12/ 17at 10:58;  Start 11/12/17 at 10:08;  Stop 11/12/17 at 10:09;  Status DC


Midazolam HCl (Versed Inj) 5 mg STK-MED ONCE .ROUTE  Last administered on 11/12/ 17at 10:57;  Start 11/12/17 at 10:09;  Stop 11/12/17 at 10:10;  Status DC


Albuterol Sulfate (Albuterol Neb) 2.5 mg Q4HR NEB  PRN NEB DYSPNEA;  Start 11/12 /17 at 10:30;  Stop 11/17/17 at 12:30;  Status DC


Propofol 50 ml @ As Directed STK-MED ONCE .ROUTE  Last administered on 11/12/ 17at 10:59;  Start 11/12/17 at 10:18;  Stop 11/12/17 at 10:19;  Status DC


Chlorhexidine Gluconate (Peridex 0.12% Liq) 15 ml BID@08,20 MT  Last 

administered on 11/12/17at 21:32;  Start 11/12/17 at 20:00


Propofol 100 ml @  2.034 mls/ hr TITRATE  PRN IV SEDATION Last administered on 

11/13/17at 05:49;  Start 11/12/17 at 10:30;  Stop 11/13/17 at 18:00;  Status DC


Fentanyl Citrate 250 ml @ 5 mls/hr TITRATE  PRN IV SEDATION Last administered 

on 11/12/17at 18:25;  Start 11/12/17 at 10:30;  Stop 11/13/17 at 18:00;  Status 

DC


Albuterol/ Ipratropium (Duoneb Neb) 1 ampule Q4HR  NEB NEB  Last administered 

on 11/15/17at 07:22;  Start 11/12/17 at 12:00;  Stop 11/15/17 at 10:28;  Status 

DC


Methylprednisolone Sodium Succinate (SoluMEDROL INJ) 60 mg Q12H IV PUSH ;  

Start 11/12/17 at 12:00;  Stop 11/12/17 at 12:00;  Status DC


Methylprednisolone Sodium Succinate (SoluMEDROL INJ) 60 mg Q8H IV PUSH  Last 

administered on 11/15/17at 11:01;  Start 11/12/17 at 12:00;  Stop 11/15/17 at 12

:11;  Status DC


Enoxaparin Sodium (Lovenox Inj) 40 mg Q24H SQ  Last administered on 11/12/17at 

12:54;  Start 11/12/17 at 12:00;  Status Future Hold


Ferrous Sulfate (Ferrous Sulfate Liq) 300 mg BID PO  Last administered on 11/13/ 17at 09:05;  Start 11/12/17 at 14:30;  Stop 11/13/17 at 17:41;  Status DC


Acetylcysteine (Mucomyst 20% Neb) 2 ml Q6HR  NEB NEB ;  Start 11/12/17 at 16:00

;  Stop 11/12/17 at 17:41;  Status DC


Non-Formulary Medication 2 ML NEB EVERY 6 HOURS Q6HR  NEB NEB  Last 

administered on 11/14/17at 04:25;  Start 11/12/17 at 18:00;  Stop 11/15/17 at 10

:41;  Status DC


Potassium Bicarb/ Potassium Chloride (K-Lyte Cl  Eff) 25 meq ONCE  ONCE PO  

Last administered on 11/13/17at 07:26;  Start 11/13/17 at 06:45;  Stop 11/13/17 

at 06:51;  Status DC


Lactulose (Lactulose Liq) 30 ml DAILY NG  Last administered on 11/17/17at 09:16

;  Start 11/13/17 at 09:00


Docusate Sodium (Colace) 100 mg BID PO  Last administered on 11/17/17at 09:16;  

Start 11/13/17 at 09:00


Magnesium Sulfate/ Dextrose 100 ml @  100 mls/hr ONCE  ONCE IV  Last 

administered on 11/13/17at 08:57;  Start 11/13/17 at 08:45;  Stop 11/13/17 at 09

:44;  Status DC


Sodium Chloride 250 ml @ 0 mls/hr BOLUS  ONCE IV  Last administered on 11/13/ 17at 10:30;  Start 11/13/17 at 10:30;  Stop 11/13/17 at 10:31;  Status DC


Sodium Chloride 500 ml @  500 mls/hr BOLUS  ONCE IV  Last administered on 11/13/ 17at 13:11;  Start 11/13/17 at 13:15;  Stop 11/13/17 at 14:14;  Status DC


Sodium Chloride 500 ml @  500 mls/hr Q1H ONCE IV  Last administered on 11/13/ 17at 17:36;  Start 11/13/17 at 17:30;  Stop 11/13/17 at 18:29;  Status DC


Ferrous Sulfate (Ferrous Sulfate) 325 mg BID PO  Last administered on 11/17/ 17at 09:16;  Start 11/13/17 at 21:00


Oxycodone/ Acetaminophen (Percocet 7.5-325 Mg) 1 tab Q6H  PRN PO pain 5-10 Last 

administered on 11/17/17at 11:41;  Start 11/13/17 at 18:15


Albumin Human 500 ml @  250 mls/hr ONCE  ONCE IV  Last administered on 11/14/ 17at 04:26;  Start 11/14/17 at 03:45;  Stop 11/14/17 at 05:44;  Status DC


Furosemide (Lasix Inj) 40 mg STK-MED ONCE .ROUTE  Last administered on 11/14/ 17at 09:04;  Start 11/14/17 at 09:04;  Stop 11/14/17 at 14:59;  Status DC


Potassium Bicarb/ Potassium Chloride (K-Lyte Cl  Eff) 25 meq ONCE  ONCE PO  

Last administered on 11/15/17at 09:00;  Start 11/15/17 at 09:00;  Stop 11/15/17 

at 09:01;  Status DC


Potassium Chloride (KCl) 30 meq ONCE  ONCE PO ;  Start 11/15/17 at 11:00;  Stop 

11/15/17 at 11:00;  Status DC


Potassium Phos/ Sodium Phos (K-Phos Neutral) 250 mg Q8HR PO  Last administered 

on 11/17/17at 05:12;  Start 11/15/17 at 08:30


Bisacodyl (Dulcolax Supp) 10 mg ONCE  ONCE RECTAL  Last administered on 11/15/

17at 11:01;  Start 11/15/17 at 10:00;  Stop 11/15/17 at 10:20;  Status DC


Sodium Biphosphate/ Sodium Phosphate (Fleets Enema (Adult)) 133 ml ONCE  ONCE 

IL ;  Start 11/15/17 at 10:00;  Stop 11/15/17 at 10:20;  Status DC


Potassium Bicarb/ Potassium Chloride (K-Lyte Cl  Eff) 25 meq ONCE  ONCE PO  

Last administered on 11/15/17at 11:01;  Start 11/15/17 at 10:00;  Stop 11/15/17 

at 10:20;  Status DC


Lidocaine HCl (Xylocaine-Mpf 1% Inj) 50 ml STK-MED ONCE OTHER ;  Start 11/8/17 

at 12:00;  Stop 11/15/17 at 10:58;  Status DC


Rocuronium Bromide (Zemuron Inj) 50 mg STK-MED ONCE IV PUSH ;  Start 11/8/17 at 

12:00;  Stop 11/15/17 at 10:58;  Status DC


Neostigmine Methylsulfate (Prostigmin Inj) 3 mg STK-MED ONCE IV ;  Start 11/8/ 17 at 12:00;  Stop 11/15/17 at 10:58;  Status DC


Glycopyrrolate (Robinul Inj) 1 mg STK-MED ONCE IV PUSH ;  Start 11/8/17 at 12:00

;  Stop 11/15/17 at 10:58;  Status DC


Phenylephrine HCl (Neosynephrine/ NS 1000 Mcg/10ml Syr) 1,000 mcg STK-MED ONCE 

IV ;  Start 11/8/17 at 12:00;  Stop 11/15/17 at 10:58;  Status DC


Ondansetron HCl (Zofran Inj) 4 mg STK-MED ONCE IV PUSH ;  Start 11/8/17 at 12:00

;  Stop 11/15/17 at 10:58;  Status DC


Fentanyl Citrate (fentaNYL INJ) 200 mcg STK-MED ONCE IV ;  Start 11/8/17 at 12:

00;  Stop 11/15/17 at 10:58;  Status DC


Morphine Sulfate (Morphine Inj) 4 mg STK-MED ONCE IV ;  Start 11/8/17 at 12:00;

  Stop 11/15/17 at 10:58;  Status DC


Propofol (Diprivan  200 Mg/20 ml Inj) 200 mg STK-MED ONCE IV ;  Start 11/8/17 

at 12:00;  Stop 11/15/17 at 10:58;  Status DC


Parenteral Electrolytes 1,000 ml @  As Directed STK-MED ONCE IV ;  Start 11/8/ 17 at 12:00;  Stop 11/15/17 at 10:58;  Status DC


Lactated Ringer's 1,000 ml @  As Directed STK-MED ONCE IV ;  Start 11/8/17 at 12

:00;  Stop 11/15/17 at 10:58;  Status DC


Methylprednisolone Sodium Succinate (SoluMEDROL INJ) 40 mg BID IV PUSH  Last 

administered on 11/16/17at 09:29;  Start 11/15/17 at 21:00;  Stop 11/16/17 at 18

:02;  Status DC


Bethanechol Chloride (Urecholine) 25 mg Q8H PO  Last administered on 11/17/17at 

11:41;  Start 11/15/17 at 20:00


Furosemide (Lasix Inj) 40 mg DAILY IV PUSH  Last administered on 11/17/17at 09:

15;  Start 11/16/17 at 09:00


Potassium Chloride (KCl) 30 meq DAILY PO  Last administered on 11/17/17at 09:16

;  Start 11/16/17 at 09:00


Tamsulosin HCl (Flomax) 0.4 mg DAILY PO  Last administered on 11/17/17at 09:16;

  Start 11/16/17 at 11:00


Prednisone (Deltasone) 10 mg BID PO  Last administered on 11/17/17at 09:16;  

Start 11/16/17 at 21:00


Magnesium Sulfate/ Dextrose 100 ml @  100 mls/hr Q1H IV  Last administered on 11 /16/17at 20:26;  Start 11/16/17 at 19:00;  Stop 11/16/17 at 20:59;  Status DC


Tiotropium Bromide (Spiriva Inh) 18 mcg DAILY INH ;  Start 11/18/17 at 09:00


Urinary Catheter:  Yes


Assessment to:  Continue


Navarrete insert reason:  Obstruction/Retention





A/P


Problem List:  


(1) Pleural effusion on left


ICD Code:  J90 - Pleural effusion, not elsewhere classified


Status:  Acute


(2) Respiratory distress


ICD Code:  R06.00 - Dyspnea, unspecified


Status:  Resolved


(3) Hyponatremia


ICD Code:  E87.1 - Hypo-osmolality and hyponatremia


Status:  Acute


(4) COPD (chronic obstructive pulmonary disease)


ICD Code:  J44.9 - Chronic obstructive pulmonary disease, unspecified


Status:  Chronic


(5) Tobacco abuse


ICD Code:  Z72.0 - Tobacco use


Status:  Chronic


Assessment and Plan


ASSESSMENT 


Acute hypoxemic respiratory failure


Complete left lung atelectasis


Left-sided consolidation with pleural effusion


Left thoracotomy with decortication, evacuation of complex loculated effusion, 

adhesiolysis


Pulmonary hypertension


COPD exacerbation


Hyponatremia/hypophosphatemia


Hypokalemia.


Urinary tract infection on abx








PLAN:


Neuro:


Hold propofol and reduce fentanyl to facilitate weaning trials


Pain control with fentanyl--off all sedation at this time


ORAL PAIN CONTROL





Pulm: 


Acute hypoxemic respiratory failure


Complete L lung atelectasis


S/p decortication for L hemothorax


COPD


Tobacco abuse


?L lung mass vs loculated effusion/


Emergently intubated and placed on mechanical ventilation 11/12


s/p Bronchoscopy by Dr. Grey.  Left lung fields reexpanded, persistent 

volume loss involving left lower lung field


Start weaning trials with possible extubation--extubated November 13


Status post left thoracotomy and decortication 11/8/17-postop diagnosis was 

hemothorax


Bronchodilators, Solumedrol 60 mg IV q8 hours 


s/p CT guided left-sided thoracentesis and CT placement by IR 10/26/17


CT chest: Either highly complex fluid or soft tissue mass involving the left 

lung base with mass effect. This measures 12.2 x 12.6 x 7.4 cm. 


4.4 cm ascending aortic aneurysm.


--Status post bronchoscopy with left sided bronchial alveolar lavage by 

pulmonary on November 13


CHEST TUBE REMOVED


LOTS OF DRAINAGE FROM CHEST TUBE SITE





CV:  


Pulmonary hypertension, most likely secondary from chronic hypoxia by echo


Moderate aortic stenosis mild to moderate aortic regurgitation


Monitor HR and BP and maintain MAP>65mmHg


Echo 10/27 - systolic function grossly normal. Moderate AS/AI.  Moderate to 

severe pulmonary hypertension.. 





GI: 


Nothing by mouth-has been extubated


Diet as tolerated





: 


Hyponatremia ?chronic but chronicity not truly known


Hypokalemia, resolved- STILL LOW WILL REPLACE





ID: 


UTI


Possible pneumonia


Continue Rocephin for now


UTI present on admission with culture positive for pansensitive Klebsiella.


Influenza screen, blood and pleural fluid cultures negative to date. 


Temporal fluid studies from November 8, on cultures negative to date


URINARY RETENTION- STARTED ON BETHANECHOL YESTERDAY - WILL ADD FLOMAX


CONTINUE NAVARRETE





Endo: 


SSI with Accu-Cheks for glycemic control.





Heme: 


Macrocytic anemia 


Anemia of chronic disease and iron deficiency


Monitor CBC.  Normal B12, 


Supplement iron due to her lab findings consistent with iron deficiency


Being transfused 1 unit packed red blood cells today





DVT prophylaxis with  SCDs. Lovenox 40 mg sq daily. IV Famotidine 20 mg IV q12h


NOT BEING TRANSFUSED TODAY





Needs physical therapy and occupational therapy aggressively








NEEDS TO GET MOBILE





AM LABS


Discharge Planning


Needs to be more mobile


May require [facility] IF ABLE TO GET APPROVAL





Problem Qualifiers





(1) COPD (chronic obstructive pulmonary disease):  


Qualified Codes:  J43.1 - Panlobular emphysema








Roalnd Foote DO Nov 17, 2017 13:13

## 2017-11-17 NOTE — [email or web address]
Subjective


Subjective


Remarks


Complaining of fluid drainage at the previous chest tube sites


Patient is ready to start physical therapy





Objective


Data











  Date Time  Temp Pulse Resp B/P (MAP) Pulse Ox O2 Delivery O2 Flow Rate FiO2


 


11/17/17 17:00  93      


 


11/17/17 16:00  99      


 


11/17/17 15:42     97 Room Air  


 


11/17/17 15:42 97.5 90 25 146/74 (98) 97   


 


11/17/17 15:00  93      


 


11/17/17 14:00  88      


 


11/17/17 13:00  96      


 


11/17/17 12:00  86      


 


11/17/17 11:43     98 Room Air  


 


11/17/17 11:43 97.8 98 21 130/75 (93) 98   


 


11/17/17 11:00  85      


 


11/17/17 10:00  101      


 


11/17/17 09:00  93      


 


11/17/17 08:00  85      


 


11/17/17 07:23 97.7 87 27 123/64 (83) 98   


 


11/17/17 07:23     98 Room Air  


 


11/17/17 07:00  84      


 


11/17/17 06:02  91      


 


11/17/17 05:02  92      


 


11/17/17 04:00  84      


 


11/17/17 03:31      Room Air  


 


11/17/17 03:29 97.5 86 16 127/73 (91) 97   


 


11/17/17 03:00  88      


 


11/17/17 02:12  83      


 


11/17/17 01:00  94      


 


11/17/17 00:00  90      


 


11/16/17 23:40     98 Room Air  


 


11/16/17 23:40 97.6 95 20 126/65 (85) 98   


 


11/16/17 23:00  93      


 


11/16/17 22:00  94      


 


11/16/17 21:00  90      


 


11/16/17 20:00  88      


 


11/16/17 19:45 97.9 88 16 118/71 (87) 100   


 


11/16/17 19:45     100 Room Air  


 


11/16/17 19:00  88      


 


11/16/17 18:00  90      














 11/17/17 11/17/17 11/17/17





 07:00 15:00 23:00


 


Intake Total 480 ml  840 ml


 


Output Total 300 ml  2000 ml


 


Balance 180 ml  -1160 ml








Result Diagram:  


11/17/17 0414                                                                  

              11/17/17 0414





Laboratory Results





Laboratory Tests








Test


  11/17/17


04:14


 


White Blood Count 3.1 TH/MM3 


 


Red Blood Count 2.73 MIL/MM3 


 


Hemoglobin 9.6 GM/DL 


 


Hematocrit 28.2 % 


 


Mean Corpuscular Volume 103.4 FL 


 


Mean Corpuscular Hemoglobin 35.3 PG 


 


Mean Corpuscular Hemoglobin


Concent 34.1 % 


 


 


Red Cell Distribution Width 21.1 % 


 


Platelet Count 66 TH/MM3 


 


Mean Platelet Volume 10.5 FL 


 


Neutrophils (%) (Auto) 75.8 % 


 


Lymphocytes (%) (Auto) 15.1 % 


 


Monocytes (%) (Auto) 8.9 % 


 


Eosinophils (%) (Auto) 0.1 % 


 


Basophils (%) (Auto) 0.1 % 


 


Neutrophils # (Auto) 2.3 TH/MM3 


 


Lymphocytes # (Auto) 0.5 TH/MM3 


 


Monocytes # (Auto) 0.3 TH/MM3 


 


Eosinophils # (Auto) 0.0 TH/MM3 


 


Basophils # (Auto) 0.0 TH/MM3 


 


CBC Comment AUTO DIFF 


 


Differential Comment


  AUTO DIFF


CONFIRMED


 


Platelet Estimate LOW 


 


Platelet Morphology Comment NORMAL 


 


Blood Urea Nitrogen 11 MG/DL 


 


Creatinine 0.45 MG/DL 


 


Random Glucose 94 MG/DL 


 


Total Protein 4.9 GM/DL 


 


Albumin 2.4 GM/DL 


 


Calcium Level 7.9 MG/DL 


 


Phosphorus Level 2.6 MG/DL 


 


Magnesium Level 2.5 MG/DL 


 


Alkaline Phosphatase 178 U/L 


 


Aspartate Amino Transf


(AST/SGOT) 50 U/L 


 


 


Alanine Aminotransferase


(ALT/SGPT) 49 U/L 


 


 


Total Bilirubin 0.9 MG/DL 


 


Sodium Level 138 MEQ/L 


 


Potassium Level 3.5 MEQ/L 


 


Chloride Level 101 MEQ/L 


 


Carbon Dioxide Level 28.6 MEQ/L 


 


Anion Gap 8 MEQ/L 


 


Estimat Glomerular Filtration


Rate 144 ML/MIN 


 











Administered Medications








 Medications


  (Trade)  Dose


 Ordered  Sig/Emre


 Route


 PRN Reason  Start Time


 Stop Time Status Last Admin


Dose Admin


 


 Sennosides


  (Senokot)  17.2 mg  Q12H  PRN


 PO


 Moderate constipation  10/26/17 11:30


    11/14/17 02:19


 


 


 Nifedipine


  (Procardia Xl)  30 mg  DAILY


 PO


   11/3/17 09:00


   Future Hold 11/14/17 08:32


 


 


 Furosemide


  (Lasix)  20 mg  DAILY


 PO


   11/2/17 17:35


   Future Hold 11/12/17 09:21


 


 


 Lactulose


  (Lactulose Liq)  30 ml  QID  PRN


 PO


 severe constipation   11/7/17 13:00


    11/8/17 08:42


 


 


 Albuterol Sulfate


  (Albuterol Neb)  2.5 mg  Q2HR NEB  PRN


 NEB


 WHEEZING  11/8/17 19:30


    11/17/17 17:03


 


 


 IV Flush


  (NS Flush)  2 ml  BID


 IV FLUSH


   11/8/17 21:00


    11/17/17 09:00


 


 


 Pantoprazole


 Sodium


  (Protonix)  40 mg  HS


 PO


   11/8/17 21:00


    11/16/17 21:57


 


 


 Ondansetron HCl


  (Zofran Inj)  4 mg  Q6H  PRN


 IV PUSH


 NAUSEA OR VOMITING  11/8/17 19:30


    11/12/17 05:58


 


 


 Sodium Chloride  1,000 ml @ 


 30 mls/hr  Q24H


 IV


   11/8/17 21:00


    11/11/17 21:00


 


 


 Polyethylene


 Glycol


  (Miralax)  17 gm  DAILY


 PO


   11/9/17 09:00


    11/17/17 09:16


 


 


 Chlorhexidine


 Gluconate


  (Peridex 0.12%


 Liq)  15 ml  BID@08,20


 MT


   11/12/17 20:00


    11/12/17 21:32


 


 


 Enoxaparin Sodium


  (Lovenox Inj)  40 mg  Q24H


 SQ


   11/12/17 12:00


   Future Hold 11/12/17 12:54


 


 


 Lactulose


  (Lactulose Liq)  30 ml  DAILY


 NG


   11/13/17 09:00


    11/17/17 09:16


 


 


 Docusate Sodium


  (Colace)  100 mg  BID


 PO


   11/13/17 09:00


    11/17/17 09:16


 


 


 Ferrous Sulfate


  (Ferrous Sulfate)  325 mg  BID


 PO


   11/13/17 21:00


    11/17/17 09:16


 


 


 Oxycodone/


 Acetaminophen


  (Percocet


 7.5-325 Mg)  1 tab  Q6H  PRN


 PO


 pain 5-10  11/13/17 18:15


    11/17/17 17:30


 


 


 Potassium Phos/


 Sodium Phos


  (K-Phos Neutral)  250 mg  Q8HR


 PO


   11/15/17 08:30


    11/17/17 14:55


 


 


 Bethanechol


 Chloride


  (Urecholine)  25 mg  Q8H


 PO


   11/15/17 20:00


    11/17/17 11:41


 


 


 Furosemide


  (Lasix Inj)  40 mg  DAILY


 IV PUSH


   11/16/17 09:00


    11/17/17 09:15


 


 


 Potassium Chloride


  (KCl)  30 meq  DAILY


 PO


   11/16/17 09:00


    11/17/17 09:16


 


 


 Tamsulosin HCl


  (Flomax)  0.4 mg  DAILY


 PO


   11/16/17 11:00


    11/17/17 09:16


 


 


 Prednisone


  (Deltasone)  10 mg  BID


 PO


   11/16/17 21:00


    11/17/17 09:16


 








Objective Remarks


GENERAL: Well-nourished, well-developed patient.


SKIN: Warm and dry.


HEAD: Normocephalic.


EYES: No scleral icterus. No injection or drainage. 


NECK: Supple, trachea midline. No JVD or lymphadenopathy.


LYMPHATIC: No adenopathy.


CARDIOVASCULAR: Regular rate and rhythm without murmurs. 


RESPIRATORY: Breath sounds equal bilaterally. No accessory muscle use.


GASTROINTESTINAL: Abdomen soft, non-tender, nondistended. 


EXTREMITIES: No cyanosis, or edema. 


MUSCULOSKELETAL: Adequate muscle tone.


NEUROLOGICAL: No obvious focal deficit. Awake, alert, and oriented x3.


PSYCHIATRIC: Appropriate mood and affect; insight and judgment normal.





Assessment/Plan


Problem List:  


(1) Pancytopenia


ICD Codes:  D61.818 - Other pancytopenia


Plan:  11/17 Pancytopenia with macrocytosis persist. Blood counts Are stable


          Monitor CBC





11/16: Patient continues to decline bone marrow biopsy. Pancytopenia improved 

today. Monitor CBC


--Pancytopenia with macrocytosis and normal B12.  


--suspect that she has myelodysplastic syndrome until proven otherwise.


--will need bone marrow biopsy and aspirate. (declining at this time)


--iron studies are consistent with anemia of chronic disease 


--blood transfusion if the hemoglobin is less than 8 and platelet transfusion 

if the platelet count is less than 15 or any bleeding .





Assessment


57y/o female with pancytopenia with macrocytosis.


h/o COPD











Velma Kapoor MD Nov 17, 2017 17:59

## 2017-11-18 NOTE — HHI.PR
Subjective


Remarks


The patient is a 56-year-old female with past medical history of COPD, who 

presented to [facility] ED with complaints of coughing, wheezing. 

The patient states that she had food poisoning last week where she had nausea 

and diarrhea, after she ate suspicious food. She denies any worsening of 

dyspnea from baseline. In addition, she denies any constitutional symptoms. She 

quit smoking five and a half years ago and used to smoke one to two packs per 

day for about 30 years.  Chest x-ray in the ER showed consolidation with large 

effusion and volume loss on the left.  On further history she denies any 

hemoptysis, however, she reports 20 pounds weight loss in the last 4 months and 

decreased p.o. intake.  She is being followed up by Dr. Grey her outpatient 

pulmonologist.  The patient denies any use of oxygen at home, however, she is 

on bronchodilators p.r.n. Her laboratory data is significant for hyponatremia 

with sodium level 125, lactic acid level 2.1.  On arrival to the ER she was 

tachycardic and tachypneic.  When seen the patient is on 4 liters nasal cannula 

with saturation ranges between %.  In the ER she was given Rocephin, 

azithromycin, Solu-Medrol, bronchodilator treatment and IV fluids.  Her current 

blood pressure is 107/61.





10/27 Patient s/p CT guided thoracentesis and CT placement by IR yesterday. She 

is feeling better. 





Subjective:


10/28 She complains of pain and requests increased pain meds. Repeat imaging 

ordered per pulmonology to evaluate mass vs loculated effusion. On NC. 





[facility] RECONSULT NOTE 11/12/17


Critical care consulted for severe hypoxemic respiratory failure.  On 11/8/17 

patient had left thoracotomy for decortication, evacuation of complex loculated 

effusion, adhesiolysis by Dr. Rodriguez.  CXR 11/11/17 showed near complete 

whiteout of the left lung-pulmonary Dr. Khalil is following.  Today a HALICAT 

was called as the patient was found profoundly hypoxemic in the 70s oxygen 

saturation.  She was placed on 100% percent nonrebreather with improvement in 

saturation only to 81%.  Stat ABG on 100% nonrebreather showed pH of 7.29 PCO2 

of 62 pO2 of 53 and oxygen saturation 79%.  Stat chest x-ray showed complete 

whiteout of the left lung.  I discussed with the patient briefly, she is 

agreeable for intubation.  I proceeded with endotracheal intubation and placed 

on mechanical ventilation -patient saturation gradually improved to 100%.  

Discussed with pulmonology Dr. Khalil.  He plans to do bronchoscopy today 

afternoon. 





SUBJ 11/13: Patient had bronchoscopy by Dr. Khalil yesterday.  Chest x-ray 

today shows improvement in aeration but still with volume loss left lower lung 

fields which is unchanged from the post thoracotomy chest x-rays.  Wide-awake 

on vent tolerating CPAP will proceed with weaning trials





11-14 TRANSFERRED BACK TO OUR SERVICE


EXTUBATED YESTERDAY


GETTING BLOOD 1 UNIT PRBC TODAY


PT AND OT


DW RN AND PT


LESS SOB THAN YESTERDAY








11-15 REFUSING BONE MARROW BIOPSY


NO BLOOD TRANSFUSION TODAY


NEED PT AND OT


BECOMING MOBILE


HAD CHEST TUBES OUT


AM LABS


HAD BM


DW RN AND PT





11-16 still having urinary retention


WILL NEED NAVARRETE CATHETER SINCE NOT URINATING ON HER OWN IF NOT URINATING AFTER 

LASIX


DW RN AND PT


LOTS OF DRAINAGE FROM LEFT CHEST WOUND AREA


NO SOB


HAD GOOD BM TODAY


DW RN AND PT





11-17 still needing URINARY RETENTION


STILL NEEDING NAVARRETE


STILL LOTS OF DRAINAGE FROM LEFT CHEST WOUND AREA


NO SOB, NO CHEST PAIN


DW RN AND PT





11-18 states PAIN NOT CONTROLLED


WILL INCREASE PERCOCET TO 10/325 Q6H


WANTS A SLEEPER TOO


DW RN AND PT


CONTINUE PT AND OT  BID 7DAYS A WEEK





Objective


Vitals





Vital Signs








  Date Time  Temp Pulse Resp B/P (MAP) Pulse Ox O2 Delivery O2 Flow Rate FiO2


 


11/18/17 14:00  102      


 


11/18/17 13:25      Nasal Cannula 2.00 


 


11/18/17 13:00  100      


 


11/18/17 12:00  93      


 


11/18/17 11:48 97.8 121 33 128/87 (101) 100   


 


11/18/17 11:48     100 Nasal Cannula 2.00 


 


11/18/17 11:00  129      


 


11/18/17 10:00  96      


 


11/18/17 09:00  94      


 


11/18/17 08:00  92      


 


11/18/17 07:34     100 Room Air  


 


11/18/17 07:34 97.5 99 25 149/75 (99) 100   


 


11/18/17 07:00  101      


 


11/18/17 06:03  93      


 


11/18/17 05:58  87      


 


11/18/17 04:08  96      


 


11/18/17 03:21  95      


 


11/18/17 03:09 97.7 104 21 145/78 (100) 93   


 


11/18/17 03:09     93 Room Air  


 


11/18/17 02:16  92      


 


11/18/17 01:39  91      


 


11/18/17 00:03  96      


 


11/17/17 23:15 98.6 110 16 155/86 (109) 95   


 


11/17/17 23:15  96      


 


11/17/17 23:10     97 Room Air  


 


11/17/17 23:00  96      


 


11/17/17 22:00  103      


 


11/17/17 21:00  93      


 


11/17/17 20:00  87      


 


11/17/17 19:30     97 Room Air  


 


11/17/17 19:00  91      


 


11/17/17 18:00  96      


 


11/17/17 17:00  93      


 


11/17/17 16:00  99      


 


11/17/17 15:42     97 Room Air  


 


11/17/17 15:42 97.5 90 25 146/74 (98) 97   


 


11/17/17 15:00  93      














I/O      


 


 11/17/17 11/17/17 11/17/17 11/18/17 11/18/17 11/18/17





 07:00 15:00 23:00 07:00 15:00 23:00


 


Intake Total 480 ml  840 ml 720 ml  


 


Output Total 300 ml  2000 ml 800 ml  


 


Balance 180 ml  -1160 ml -80 ml  


 


      


 


Intake Oral 480 ml  840 ml 720 ml  


 


Output Urine Total 300 ml  2000 ml 800 ml  








Result Diagram:  


11/18/17 0500                                                                  

              11/18/17 0500





Other Results





 Laboratory Tests








Test


  11/16/17


05:20 11/17/17


04:14 11/18/17


04:15 11/18/17


05:00


 


White Blood Count 4.3 TH/MM3  3.1 TH/MM3   4.7 TH/MM3 


 


Red Blood Count 2.90 MIL/MM3  2.73 MIL/MM3   2.68 MIL/MM3 


 


Hemoglobin 10.1 GM/DL  9.6 GM/DL   9.5 GM/DL 


 


Hematocrit 29.3 %  28.2 %   27.4 % 


 


Mean Corpuscular Volume 101.0 FL  103.4 FL   102.1 FL 


 


Mean Corpuscular Hemoglobin 34.9 PG  35.3 PG   35.3 PG 


 


Mean Corpuscular Hemoglobin


Concent 34.6 % 


  34.1 % 


  


  34.5 % 


 


 


Red Cell Distribution Width 21.2 %  21.1 %   21.3 % 


 


Platelet Count 67 TH/MM3  66 TH/MM3   67 TH/MM3 


 


Mean Platelet Volume 9.7 FL  10.5 FL   9.1 FL 


 


Neutrophils (%) (Auto) 82.9 %  75.8 %   78.2 % 


 


Lymphocytes (%) (Auto) 9.4 %  15.1 %   13.0 % 


 


Monocytes (%) (Auto) 7.6 %  8.9 %   8.7 % 


 


Eosinophils (%) (Auto) 0.0 %  0.1 %   0.1 % 


 


Basophils (%) (Auto) 0.1 %  0.1 %   0.0 % 


 


Neutrophils # (Auto) 3.6 TH/MM3  2.3 TH/MM3   3.7 TH/MM3 


 


Lymphocytes # (Auto) 0.4 TH/MM3  0.5 TH/MM3   0.6 TH/MM3 


 


Monocytes # (Auto) 0.3 TH/MM3  0.3 TH/MM3   0.4 TH/MM3 


 


Eosinophils # (Auto) 0.0 TH/MM3  0.0 TH/MM3   0.0 TH/MM3 


 


Basophils # (Auto) 0.0 TH/MM3  0.0 TH/MM3   0.0 TH/MM3 


 


CBC Comment AUTO DIFF  AUTO DIFF   AUTO DIFF 


 


Differential Comment


  AUTO DIFF


CONFIRMED AUTO DIFF


CONFIRMED 


  AUTO DIFF


CONFIRMED


 


Platelet Estimate LOW  LOW   LOW 


 


Platelet Morphology Comment NORMAL  NORMAL   NORMAL 


 


Blood Urea Nitrogen 10 MG/DL  11 MG/DL   10 MG/DL 


 


Creatinine 0.52 MG/DL  0.45 MG/DL   0.34 MG/DL 


 


Random Glucose 121 MG/DL  94 MG/DL   98 MG/DL 


 


Total Protein 5.2 GM/DL  4.9 GM/DL   4.6 GM/DL 


 


Albumin 2.6 GM/DL  2.4 GM/DL   2.3 GM/DL 


 


Calcium Level 8.1 MG/DL  7.9 MG/DL   7.7 MG/DL 


 


Phosphorus Level 2.2 MG/DL  2.6 MG/DL   3.2 MG/DL 


 


Magnesium Level 2.0 MG/DL  2.5 MG/DL   2.1 MG/DL 


 


Alkaline Phosphatase 174 U/L  178 U/L   182 U/L 


 


Aspartate Amino Transf


(AST/SGOT) 38 U/L 


  50 U/L 


  


  31 U/L 


 


 


Alanine Aminotransferase


(ALT/SGPT) 41 U/L 


  49 U/L 


  


  49 U/L 


 


 


Total Bilirubin 0.7 MG/DL  0.9 MG/DL   0.8 MG/DL 


 


Sodium Level 140 MEQ/L  138 MEQ/L   138 MEQ/L 


 


Potassium Level 3.9 MEQ/L  3.5 MEQ/L   3.7 MEQ/L 


 


Chloride Level 102 MEQ/L  101 MEQ/L   100 MEQ/L 


 


Carbon Dioxide Level 30.9 MEQ/L  28.6 MEQ/L   31.2 MEQ/L 


 


Anion Gap 7 MEQ/L  8 MEQ/L   7 MEQ/L 


 


Estimat Glomerular Filtration


Rate 122 ML/MIN 


  144 ML/MIN 


  


  199 ML/MIN 


 


 


Urine Color   YELLOW  


 


Urine Turbidity   CLEAR  


 


Urine pH   7.0  


 


Urine Specific Gravity   1.014  


 


Urine Protein   TRACE mg/dL  


 


Urine Glucose (UA)   NEG mg/dL  


 


Urine Ketones   NEG mg/dL  


 


Urine Occult Blood   SMALL  


 


Urine Nitrite   NEG  


 


Urine Bilirubin   NEG  


 


Urine Urobilinogen   4.0 MG/DL  


 


Urine Leukocyte Esterase   NEG  


 


Urine RBC   4-9 /hpf  


 


Urine WBC   0-2 /hpf  


 


Urine Squamous Epithelial


Cells 


  


  0-5 /hpf 


  


 


 


Urine Bacteria   RARE /hpf  


 


Urine Yeast with Hyphae   RARE  


 


Urine Yeast (Budding)   OCC  


 


Microscopic Urinalysis Comment


  


  


  CATH-CULTURE


IND 


 








Imaging





Last Impressions








Chest X-Ray 11/16/17 0600 Signed





Impressions: 





 Service Date/Time:  Thursday, November 16, 2017 03:46 - CONCLUSION:  Stable 





 examination. Residual pleural effusion on the left and residual pleural air 





 similar to the previous study.       Ernesto Wyman MD 


 


Abdomen X-Ray 11/12/17 1820 Signed





Impressions: 





 Service Date/Time:  Sunday, November 12, 2017 18:30 - CONCLUSION:  1. No 

obvious 





 obstruction or pneumoperitoneum on this limited view of the abdomen. 2. 





 Nasogastric tube with the tip projecting over the proximal gastric body. 3. 





 Volume loss in the left hemithorax with stable position of 2 thoracostomy 

tubes 





 and left basilar consolidation. .     Chris Kumari MD 


 


Chest CT 10/28/17 0000 Signed





Impressions: 





 Service Date/Time:  Saturday, October 28, 2017 14:44 - CONCLUSION:  1. 

Interval 





 placement of left-sided chest tube with slight decrease in the complex 





 low-density fluid collection in the lower left hemithorax. 2. Volume loss 





 remains in the left hemithorax with consolidation in the left infrahilar 

region 





 3. New small right pleural effusion and consolidation in the right posterior 





 lower lobe 4. The previously noted fluid and debris in the left mainstem 





 bronchus is no longer present.     Ibrahima Mccarthy MD 


 


Chest Ultrasound 10/27/17 0000 Signed





Impressions: 





 Service Date/Time:  Friday, October 27, 2017 09:59 - CONCLUSION:  No focal 





 collections of anechoic free fluid.  Prominent amount of heterogeneous 





 echotexture material surrounding the left lower chest.     Efrain Samuels MD 


 


Chest Tube Insertion 10/26/17 1224 Signed





Impressions: 





 Service Date/Time:  Thursday, October 26, 2017 13:22 - CONCLUSION: 

Uncomplicated 





 CT-guided thoracentesis.     Jorge Carrillo MD 








Objective Remarks


GENERAL: Awake alert oriented 3 --talkative and cooperative


SKIN: Warm and dry.  No obvious rashes


HEAD: Atraumatic. Normocephalic. 


EYES: Pupils equal and round. No scleral icterus. No injection or drainage.  

Extraocular muscles intact


ENT: No nasal bleeding or discharge.  Mucous membranes pink and moist.  Tongue 

is midline


NECK: Trachea midline. No JVD.  Supple


CARDIOVASCULAR: Regular rate and rhythm.  S1-S2 no S3 or S4 no heave or thrill 

or rub


RESPIRATORY: No accessory muscle use.  Breath sounds equal bilaterally.  Coarse 

breath sounds bilaterally


GASTROINTESTINAL: Abdomen soft, non-tender, nondistended. Hepatic and splenic 

margins not palpable. 


MUSCULOSKELETAL: Extremities without clubbing, cyanosis, or edema. No obvious 

deformities.  Bilateral lower extremity weakness


NEUROLOGICAL: Awake and alert. No obvious cranial nerve deficits.  Motor 

grossly within normal limits.  4 out of 5  muscle strength in the arms and 4 

out of 5 muscle strength in the legs.  Normal speech.


PSYCHIATRIC: Appropriate mood and affect; insight and judgment ABnormal.





NAVARRETE CATHETER IN PLACE


Procedures


CT guided thoracentesis with L chest tube placement 10/26


Left thoracoscopic exploration, left thoracotomy for decortication, evacuation 

of complex loculated effusion, adhesiolysis 11/8/17 11-12 INTUBATED


11-13 EXTUBATED


11-13 BRONCHOSCOPY WITH BAL OF LEFT SIDE


Medications and IVs





Current Medications


Sodium Chloride (NS Flush) 2 ml UNSCH  PRN IVF FLUSH AFTER USING IV ACCESS Last 

administered on 11/5/17at 21:04;  Start 10/26/17 at 10:15;  Stop 11/7/17 at 17:

47;  Status DC


Methylprednisolone Sodium Succinate (SoluMEDROL INJ) 125 mg ONCE  ONCE IV PUSH  

Last administered on 10/26/17at 10:17;  Start 10/26/17 at 10:15;  Stop 10/26/17 

at 10:16;  Status DC


Albuterol/ Ipratropium (Duoneb Neb) 1 ampule ONCE  ONCE INH  Last administered 

on 10/26/17at 10:13;  Start 10/26/17 at 10:15;  Stop 10/26/17 at 10:16;  Status 

DC


Albuterol Sulfate (Albuterol Neb) 2.5 mg Q15M INH  Last administered on 10/26/

17at 10:13;  Start 10/26/17 at 10:15;  Stop 10/26/17 at 10:31;  Status DC


Sodium Chloride 1,000 ml @  125 mls/hr Q8H IV  Last administered on 10/26/17at 

10:17;  Start 10/26/17 at 10:15;  Stop 10/26/17 at 11:35;  Status DC


Ondansetron HCl (Zofran Inj) 4 mg ONCE  ONCE IV PUSH  Last administered on 10/26

/17at 10:41;  Start 10/26/17 at 10:45;  Stop 10/26/17 at 10:46;  Status DC


Ceftriaxone Sodium 1000 mg/ Sodium Chloride 100 ml @  200 mls/hr ONCE  ONCE IV  

Last administered on 10/26/17at 11:54;  Start 10/26/17 at 11:15;  Stop 10/26/17 

at 11:44;  Status DC


Azithromycin 500 mg/Sodium Chloride 250 ml @  250 mls/hr ONCE  ONCE IV  Last 

administered on 10/26/17at 12:31;  Start 10/26/17 at 11:15;  Stop 10/26/17 at 12

:14;  Status DC


Pantoprazole Sodium (Protonix Inj) 40 mg DAILY IV PUSH  Last administered on 10/

28/17at 08:54;  Start 10/26/17 at 12:00;  Stop 10/28/17 at 14:05;  Status DC


Albuterol/ Ipratropium (Duoneb Neb) 1 ampule Q4HR  NEB INH  Last administered 

on 10/30/17at 07:58;  Start 10/26/17 at 12:00;  Stop 10/30/17 at 11:59;  Status 

DC


Albuterol/ Ipratropium (Duoneb Neb) 1 ampule Q2HR NEB  PRN INH WHEEZING Last 

administered on 11/1/17at 10:14;  Start 10/26/17 at 11:30;  Stop 11/8/17 at 20:

29;  Status DC


Miscellaneous Information 1 [id number]  Last administered on 10/26/17at 21:22;  

Start 10/26/17 at 11:30;  Stop 11/4/17 at 23:35;  Status DC


Chlorhexidine Gluconate (Chlorhexidine 2% Cloth) Taper DAILY@04 TOP  Last 

administered on 10/29/17at 04:00;  Start 10/27/17 at 04:00;  Stop 11/4/17 at 23:

35;  Status DC


Chlorhexidine Gluconate (Chlorhexidine 2% Cloth) 3 pack UNSCH  PRN TOP HYGIENIC 

CARE;  Start 10/26/17 at 11:30;  Stop 11/4/17 at 23:35;  Status DC


Senna/Docusate Sodium (Jess-Colace) 1 tab BID PO  Last administered on 11/8/ 17at 08:44;  Start 10/26/17 at 21:00;  Stop 11/8/17 at 20:29;  Status DC


Magnesium Hydroxide (Milk Of Magnesia Liq) 30 ml Q12H  PRN PO Mild constipation 

Last administered on 11/6/17at 20:33;  Start 10/26/17 at 11:30;  Stop 11/8/17 

at 20:29;  Status DC


Sennosides (Senokot) 17.2 mg Q12H  PRN PO Moderate constipation Last 

administered on 11/14/17at 02:19;  Start 10/26/17 at 11:30


Bisacodyl (Dulcolax Supp) 10 mg DAILY  PRN RECTAL SEVERE CONSITIPATION;  Start 

10/26/17 at 11:30;  Stop 11/14/17 at 14:59;  Status DC


Lactulose (Lactulose Liq) 30 ml DAILY  PRN PO SEVERE CONSITIPATION;  Start 10/26

/17 at 11:30;  Status Cancel


Sodium Chloride 1,000 ml @  84 mls/hr [id number] IV  Last administered on 10/26/

17at 17:26;  Start 10/26/17 at 11:30;  Stop 10/27/17 at 08:37;  Status DC


Potassium Chloride 100 ml @  50 mls/hr Q2H  PRN IV For Potassium 2.8 - 3.2 mEq/L

;  Start 10/26/17 at 11:30;  Stop 10/28/17 at 14:05;  Status DC


Potassium Chloride 100 ml @  50 mls/hr Q2H  PRN IV For Potassium 2.8 - 3.2 mEq/L

;  Start 10/26/17 at 11:30;  Stop 10/28/17 at 14:05;  Status DC


Potassium Bicarb/ Potassium Chloride (K-Lyte Cl  Eff) 50 meq UNSCH  PRN PO For 

Potassium 3.3 - 3.5 mEq/L;  Start 10/26/17 at 11:30;  Stop 10/28/17 at 14:05;  

Status DC


Potassium Chloride 100 ml @  25 mls/hr UNSCH  PRN IV For Potassium 3.3 - 3.5 mEq

/L;  Start 10/26/17 at 11:30;  Stop 10/28/17 at 14:05;  Status DC


Potassium Chloride 100 ml @  50 mls/hr Q2H  PRN IV For Potassium 3.3 - 3.5 mEq/L

;  Start 10/26/17 at 11:30;  Stop 10/28/17 at 14:05;  Status DC


Magnesium Sulfate 4 gm/Sodium Chloride 100 ml @  50 mls/hr UNSCH  PRN IV For 

Magnesium 0.9 - 1.1 mg/dL;  Start 10/26/17 at 11:30;  Stop 10/28/17 at 14:05;  

Status DC


Magnesium Oxide (Mag-Ox) 800 mg UNSCH  PRN PO For Magnesium 1.2 - 1.6 mg/dL;  

Start 10/26/17 at 11:30;  Stop 10/28/17 at 14:05;  Status DC


Magnesium Sulfate 2 gm/Sodium Chloride 100 ml @  50 mls/hr UNSCH  PRN IV For 

Magnesium 1.2 - 1.6 mg/dL;  Start 10/26/17 at 11:30;  Stop 10/28/17 at 14:05;  

Status DC


Potassium Phosphate (K-Phos) 2,000 mg Q4H  PRN PO For Phosphorus < 2.5 mg/dL;  

Start 10/26/17 at 11:30;  Stop 10/28/17 at 14:05;  Status DC


Sodium Phosphate 30 mmol/Sodium Chloride 250 ml @  42 mls/hr UNSCH  PRN IV For 

Phosphorus < 2.5 mg/dL;  Start 10/26/17 at 11:30;  Stop 10/28/17 at 14:05;  

Status DC


Potassium Phosphate (K-Phos) 2,000 mg UNSCH  PRN PO/TUBE SEE LABEL COMMENTS;  

Start 10/26/17 at 11:30;  Stop 10/28/17 at 14:05;  Status DC


Potassium Phosphate 30 mmol/ Sodium Chloride 260 ml @  42 mls/hr UNSCH  PRN IV 

SEE LABEL COMMENTS Last administered on 10/27/17at 11:46;  Start 10/26/17 at 11:

30;  Stop 10/28/17 at 14:05;  Status DC


Dextrose (D50w (Vial) Inj) 50 ml UNSCH  PRN IV PUSH HYPOGLYCEMIA-SEE COMMENTS;  

Start 10/26/17 at 11:30;  Stop 11/4/17 at 23:34;  Status DC


Glucagon (Glucagon Inj) 1 mg UNSCH  PRN OTHER HYPOGLYCEMIA-SEE COMMENTS;  Start 

10/26/17 at 11:30;  Stop 11/4/17 at 23:34;  Status DC


Insulin Human Regular (NovoLIN R SUPPLEMENTAL SCALE) 1 Q6H SQ  Last 

administered on 10/29/17at 17:30;  Start 10/26/17 at 11:30;  Stop 11/4/17 at 23:

34;  Status DC


Ceftriaxone Sodium 1000 mg/ Sodium Chloride 100 ml @  200 mls/hr Q24H IV  Last 

administered on 10/31/17at 12:20;  Start 10/27/17 at 12:00;  Stop 11/1/17 at 11:

59;  Status DC


Azithromycin 500 mg/Sodium Chloride 250 ml @  250 mls/hr Q24H IV  Last 

administered on 10/28/17at 11:48;  Start 10/27/17 at 13:00;  Stop 10/28/17 at 15

:20;  Status DC


Methylprednisolone Sodium Succinate (SoluMEDROL INJ) 60 mg Q6HR IV PUSH  Last 

administered on 10/27/17at 12:31;  Start 10/26/17 at 16:00;  Stop 10/27/17 at 13

:03;  Status DC


Lidocaine/ Epinephrine (Xylocaine-Epi 1%-1:100,000 Inj) 20 ml STK-MED ONCE 

.ROUTE  Last administered on 10/26/17at 12:38;  Start 10/26/17 at 12:38;  Stop 

10/26/17 at 12:39;  Status DC


Fentanyl Citrate (fentaNYL INJ) 100 mcg STK-MED ONCE .ROUTE  Last administered 

on 10/26/17at 12:47;  Start 10/26/17 at 12:47;  Stop 10/26/17 at 12:48;  Status 

DC


Midazolam HCl (Versed Inj) 2 mg STK-MED ONCE .ROUTE  Last administered on 10/26/

17at 12:47;  Start 10/26/17 at 12:47;  Stop 10/26/17 at 12:48;  Status DC


Iohexol (Omnipaque 350 Inj) 69 ml STK-MED ONCE IVCONTRAST  Last administered on 

10/26/17at 13:33;  Start 10/26/17 at 13:33;  Stop 10/26/17 at 13:34;  Status DC


Ephedrine Sulfate (ePHEDrine/NS 25 MG/5 ML SYR) 25 mg STK-MED ONCE .ROUTE ;  

Start 10/26/17 at 13:40;  Stop 10/26/17 at 13:41;  Status DC


Acetaminophen (Tylenol) 650 mg Q6H  PRN PO PAIN Last administered on 10/27/17at 

04:13;  Start 10/27/17 at 04:15;  Stop 10/30/17 at 11:16;  Status DC


Oxycodone/ Acetaminophen (Percocet  5-325 Mg) 1 tab ONCE  ONCE PO  Last 

administered on 10/27/17at 09:39;  Start 10/27/17 at 09:30;  Stop 10/27/17 at 09

:31;  Status DC


Methylprednisolone Sodium Succinate (SoluMEDROL INJ) 40 mg Q6HR IV PUSH  Last 

administered on 10/29/17at 05:12;  Start 10/27/17 at 18:00;  Stop 10/29/17 at 11

:40;  Status DC


Morphine Sulfate (Morphine Inj) 1 mg ONCE  ONCE IV PUSH  Last administered on 10

/27/17at 15:06;  Start 10/27/17 at 15:00;  Stop 10/27/17 at 15:01;  Status DC


Acetaminophen (Tylenol) 650 mg Q6H  PRN PO PAIN 1-2;  Start 10/27/17 at 22:30


Morphine Sulfate (Morphine Inj) 1 mg Q4H  PRN IV PAIN 5-10 Last administered on 

10/28/17at 11:12;  Start 10/27/17 at 22:30;  Stop 10/28/17 at 13:46;  Status DC


Morphine Sulfate (Morphine Inj) 2 mg Q4H  PRN IV BREAKTHROUGH PAIN 6-10 Last 

administered on 11/8/17at 13:33;  Start 10/28/17 at 14:30;  Stop 11/8/17 at 19:

27;  Status DC


Acetaminophen/ Hydrocodone Bitart (Norco  5-325 Mg) 1 tab Q6H  PRN PO PAIN 3-5 

Last administered on 10/30/17at 05:18;  Start 10/28/17 at 13:45;  Stop 11/6/17 

at 09:01;  Status DC


Acetaminophen/ Hydrocodone Bitart (Norco  5-325 Mg) 2 tab Q6H  PRN PO PAIN 6-10 

Last administered on 11/6/17at 08:39;  Start 10/28/17 at 13:45;  Stop 11/6/17 

at 09:01;  Status DC


Pantoprazole Sodium (Protonix) 40 mg DAILY PO  Last administered on 11/8/17at 08

:41;  Start 10/29/17 at 09:00;  Stop 11/8/17 at 20:29;  Status DC


Iohexol (Omnipaque 350 Inj) 70 ml STK-MED ONCE IVCONTRAST  Last administered on 

10/28/17at 14:59;  Start 10/28/17 at 14:59;  Stop 10/28/17 at 15:00;  Status DC


Azithromycin 500 mg/Sodium Chloride 250 ml @  250 mls/hr Q24H IV  Last 

administered on 11/1/17at 11:57;  Start 10/30/17 at 13:00;  Stop 11/1/17 at 14:

46;  Status DC


Methylprednisolone Sodium Succinate (SoluMEDROL INJ) 40 mg Q12HR IV PUSH  Last 

administered on 10/31/17at 07:37;  Start 10/29/17 at 21:00;  Stop 10/31/17 at 13

:51;  Status DC


Methylprednisolone Sodium Succinate (SoluMEDROL INJ) 40 mg DAILY IV PUSH  Last 

administered on 11/2/17at 08:15;  Start 11/1/17 at 09:00;  Stop 11/2/17 at 18:19

;  Status DC


Nifedipine (Procardia Xl) 60 mg ONCE  ONCE PO  Last administered on 11/2/17at 17

:49;  Start 11/2/17 at 17:30;  Stop 11/2/17 at 17:32;  Status DC


Nifedipine (Procardia Xl) 30 mg DAILY PO  Last administered on 11/14/17at 08:32

;  Start 11/3/17 at 09:00;  Status Future Hold


Furosemide (Lasix) 20 mg DAILY PO  Last administered on 11/12/17at 09:21;  

Start 11/2/17 at 17:35;  Status Future Hold


Prednisone (Deltasone) 10 mg DAILY PO  Last administered on 11/12/17at 09:21;  

Start 11/5/17 at 09:00;  Stop 11/14/17 at 14:59;  Status DC


Acetaminophen/ Hydrocodone Bitart (Norco  5-325 Mg) 1 tab Q4H  PRN PO PAIN 3-5;

  Start 11/6/17 at 09:00;  Stop 11/8/17 at 19:27;  Status DC


Acetaminophen/ Hydrocodone Bitart (Norco  5-325 Mg) 2 tab Q4H  PRN PO PAIN 6-10 

Last administered on 11/8/17at 08:47;  Start 11/6/17 at 09:15;  Stop 11/8/17 at 

19:27;  Status DC


Lactulose (Lactulose Liq) 30 ml ONCE  ONCE PO ;  Start 11/7/17 at 13:00;  Stop 

11/7/17 at 13:19;  Status DC


Lactulose (Lactulose Liq) 30 ml QID  PRN PO severe constipation  Last 

administered on 11/8/17at 08:42;  Start 11/7/17 at 13:00


Simethicone (Phazyme Chew) 125 mg ONCE  ONCE PO  Last administered on 11/7/17at 

14:59;  Start 11/7/17 at 13:30;  Stop 11/7/17 at 13:31;  Status DC


Sodium Chloride (NS Flush) 2 ml BID IV FLUSH  Last administered on 11/8/17at 09:

00;  Start 11/7/17 at 21:00;  Stop 11/8/17 at 20:29;  Status DC


Sodium Chloride (NS Flush) 2 ml UNSCH  PRN IV FLUSH FLUSH AFTER USING IV ACCESS

;  Start 11/7/17 at 16:15;  Stop 11/8/17 at 20:29;  Status DC


Cefazolin Sodium 500 mg/Sodium Chloride 505 ml @ 0 mls/hr ON  CALL IRRIGATION ;

  Start 11/7/17 at 16:15;  Stop 11/14/17 at 14:59;  Status DC


Cefazolin Sodium/ Dextrose 50 ml @  150 mls/hr ON  CALL IV  Last administered 

on 11/8/17at 16:17;  Start 11/7/17 at 16:15;  Stop 11/14/17 at 14:59;  Status DC


Chlorhexidine Gluconate (Hibiclens 4% Top Soln) 1 applic ON  CALL TOPICAL  Last 

administered on 11/8/17at 08:42;  Start 11/7/17 at 16:15;  Stop 11/9/17 at 08:40

;  Status DC


Dextrose (D50w (Vial) Inj) 50 ml UNSCH  PRN IV PUSH HYPOGLYCEMIA-SEE COMMENTS;  

Start 11/7/17 at 16:15


Lactated Ringer's 1,000 ml @  30 mls/hr Q24H PRN IV SEE LABEL COMMENTS Last 

administered on 11/8/17at 14:53;  Start 11/7/17 at 18:30;  Stop 11/8/17 at 20:23

;  Status DC


Sodium Chloride 500 ml @  30 mls/hr [id number] PRN IV SEE LABEL COMMENTS;  Start 11 /7/17 at 18:30;  Stop 11/8/17 at 20:23;  Status DC


Metoprolol Tartrate (Lopressor) 25 mg ON CALL  PRN PO SEE LABEL COMMENTS;  

Start 11/7/17 at 18:30;  Stop 11/8/17 at 20:23;  Status DC


Povidone Iodine (Betadine 5% Antisepsis Kit) 1 applic ON CALL  PRN EACH NARE 

SEE LABEL COMMENTS;  Start 11/7/17 at 18:30;  Stop 11/8/17 at 20:23;  Status DC


Chlorhexidine Gluconate (Chlorhexidine 2% Cloth) 3 pack ON CALL  PRN TOPICAL 

SEE LABEL COMMENTS;  Start 11/7/17 at 18:30;  Stop 11/9/17 at 08:40;  Status DC


Insulin Human Regular (NovoLIN R INJ) See Protocol Table ... ON CALL  PRN SQ 

SEE PROTOCOL TABLE;  Start 11/7/17 at 18:30;  Stop 11/8/17 at 20:23;  Status DC


Albuterol/ Ipratropium (Duoneb Neb) 1 ampule Q6HR  NEB NEB  Last administered 

on 11/8/17at 09:40;  Start 11/7/17 at 22:00;  Stop 11/8/17 at 20:29;  Status DC


Bupivacaine HCl (Marcaine Pf 0.5% Inj) 30 ml ONCE  ONCE INFIL  Last 

administered on 11/8/17at 17:51;  Start 11/8/17 at 17:51;  Stop 11/8/17 at 17:53

;  Status DC


Albuterol Sulfate (Albuterol Neb) 2.5 mg Q6HR  NEB NEB  Last administered on 11/

10/17at 07:08;  Start 11/8/17 at 22:00;  Stop 11/10/17 at 12:20;  Status DC


Albuterol Sulfate (Albuterol Neb) 2.5 mg Q2HR NEB  PRN NEB WHEEZING Last 

administered on 11/18/17at 05:47;  Start 11/8/17 at 19:30


IV Flush (NS Flush) 2 ml BID IV FLUSH  Last administered on 11/18/17at 09:00;  

Start 11/8/17 at 21:00


IV Flush (NS Flush) 2 ml UNSCH  PRN IV FLUSH FLUSH AFTER USING IV ACCESS;  

Start 11/8/17 at 19:30


Miscellaneous Information (Post-op Orders (for Pharmacy))  STAT  ONCE OTHER ;  

Start 11/8/17 at 19:30;  Stop 11/8/17 at 20:27;  Status DC


Pantoprazole Sodium (Protonix) 40 mg HS PO  Last administered on 11/17/17at 21:

31;  Start 11/8/17 at 21:00


Ondansetron HCl (Zofran Inj) 4 mg Q6H  PRN IV PUSH NAUSEA OR VOMITING Last 

administered on 11/12/17at 05:58;  Start 11/8/17 at 19:30


Docusate Calcium (Surfak) 240 mg HS PO  Last administered on 11/8/17at 23:35;  

Start 11/8/17 at 21:00;  Stop 11/9/17 at 08:40;  Status DC


Magnesium Hydroxide (Milk Of Magnesia Liq) 30 ml DAILY  PRN PO MILD CONSTIPATION

;  Start 11/8/17 at 19:30


Acetaminophen 100 ml @  400 mls/hr Q6H IV  Last administered on 11/9/17at 19:24

;  Start 11/8/17 at 21:00;  Stop 11/9/17 at 15:14;  Status DC


Naloxone HCl (Narcan Inj) 0.4 mg UNSCH  PRN IV PUSH RESPIRATORY RATE LESS THAN 

10;  Start 11/8/17 at 19:30;  Stop 11/13/17 at 18:00;  Status DC


Morphine Sulfate (Morphine 1 Mg/ ml PCA) 30 mg UNSCH IV  Last administered on 11 /12/17at 09:29;  Start 11/8/17 at 19:30;  Stop 11/13/17 at 18:00;  Status DC


PCA Dosage Infused (Pha) 1 Q8HR .XX  Last administered on 11/12/17at 06:00;  

Start 11/8/17 at 22:00;  Stop 11/13/17 at 18:00;  Status DC


Morphine Sulfate (*morphine INJ PERIprocedure ONLY) 8 mg STK-MED ONCE .ROUTE  

Last administered on 11/8/17at 20:10;  Start 11/8/17 at 20:08;  Stop 11/9/17 at 

08:40;  Status DC


Morphine Sulfate (*morphine INJ PERIprocedure ONLY) 8 mg STK-MED ONCE .ROUTE  

Last administered on 11/8/17at 20:30;  Start 11/8/17 at 20:22;  Stop 11/9/17 at 

08:40;  Status DC


Meperidine HCl (*DEMEROL INJ PERIprocedural ONLY) 25 mg STK-MED ONCE .ROUTE  

Last administered on 11/8/17at 20:23;  Start 11/8/17 at 20:22;  Stop 11/9/17 at 

08:40;  Status DC


Morphine Sulfate (Morphine 1 Mg/ ml PCA) 30 mg STK-MED ONCE .ROUTE ;  Start 11/8 /17 at 20:25;  Stop 11/8/17 at 20:26;  Status DC


Sodium Chloride 1,000 ml @  30 mls/hr Q24H IV  Last administered on 11/11/17at 

21:00;  Start 11/8/17 at 21:00


Miscellaneous Information ALL NURSING DEPARTME... UNSCH  PRN .XX SEE LABEL 

COMMENTS;  Start 11/8/17 at 19:55;  Stop 11/9/17 at 19:54;  Status DC


Polyethylene Glycol (Miralax) 17 gm DAILY PO  Last administered on 11/17/17at 09

:16;  Start 11/9/17 at 09:00


Potassium Chloride (KCl) 30 meq ONCE  ONCE PO  Last administered on 11/9/17at 10

:07;  Start 11/9/17 at 08:45;  Stop 11/9/17 at 08:46;  Status DC


Potassium Chloride (KCl) 10 meq DAILY PO  Last administered on 11/15/17at 09:00

;  Start 11/9/17 at 09:00;  Stop 11/16/17 at 08:51;  Status DC


Cefazolin Sodium 1000 mg/Sodium Chloride 100 ml @  200 mls/hr Q8H IV ;  Start 11 /9/17 at 15:45;  Stop 11/9/17 at 15:45;  Status DC


Cefazolin Sodium 1000 mg/Sodium Chloride 100 ml @  200 mls/hr Q8H IV  Last 

administered on 11/10/17at 08:04;  Start 11/9/17 at 16:00;  Stop 11/10/17 at 08:

19;  Status DC


Ceftriaxone Sodium 1000 mg/ Sodium Chloride 100 ml @  200 mls/hr Q24H IV  Last 

administered on 11/16/17at 00:26;  Start 11/9/17 at 23:00;  Stop 11/16/17 at 18:

02;  Status DC


Furosemide (Lasix Inj) 20 mg ONCE  ONCE IV PUSH  Last administered on 11/10/

17at 13:08;  Start 11/10/17 at 14:00;  Stop 11/10/17 at 14:01;  Status DC


Potassium Chloride (KCl) 20 meq ONCE  ONCE PO  Last administered on 11/10/17at 

13:07;  Start 11/10/17 at 14:00;  Stop 11/10/17 at 14:01;  Status DC


Albuterol Sulfate (Albuterol Neb) 2.5 mg Q6HR WHILE AWAKE  NEB INH  Last 

administered on 11/12/17at 09:42;  Start 11/10/17 at 14:00;  Stop 11/12/17 at 10

:45;  Status DC


Furosemide (Lasix Inj) 20 mg ONCE  ONCE IV PUSH  Last administered on 11/10/

17at 18:49;  Start 11/10/17 at 17:00;  Stop 11/10/17 at 17:01;  Status DC


Potassium Chloride 100 ml @  50 mls/hr Q2H IV  Last administered on 11/11/17at 

11:26;  Start 11/11/17 at 08:00;  Stop 11/11/17 at 11:59;  Status DC


Etomidate (Amidate Inj) 40 mg STK-MED ONCE .ROUTE  Last administered on 11/12/ 17at 10:58;  Start 11/12/17 at 10:08;  Stop 11/12/17 at 10:09;  Status DC


Midazolam HCl (Versed Inj) 5 mg STK-MED ONCE .ROUTE  Last administered on 11/12/ 17at 10:57;  Start 11/12/17 at 10:09;  Stop 11/12/17 at 10:10;  Status DC


Albuterol Sulfate (Albuterol Neb) 2.5 mg Q4HR NEB  PRN NEB DYSPNEA;  Start 11/12 /17 at 10:30;  Stop 11/17/17 at 12:30;  Status DC


Propofol 50 ml @ As Directed STK-MED ONCE .ROUTE  Last administered on 11/12/ 17at 10:59;  Start 11/12/17 at 10:18;  Stop 11/12/17 at 10:19;  Status DC


Chlorhexidine Gluconate (Peridex 0.12% Liq) 15 ml BID@08,20 MT  Last 

administered on 11/12/17at 21:32;  Start 11/12/17 at 20:00


Propofol 100 ml @  2.034 mls/ hr TITRATE  PRN IV SEDATION Last administered on 

11/13/17at 05:49;  Start 11/12/17 at 10:30;  Stop 11/13/17 at 18:00;  Status DC


Fentanyl Citrate 250 ml @ 5 mls/hr TITRATE  PRN IV SEDATION Last administered 

on 11/12/17at 18:25;  Start 11/12/17 at 10:30;  Stop 11/13/17 at 18:00;  Status 

DC


Albuterol/ Ipratropium (Duoneb Neb) 1 ampule Q4HR  NEB NEB  Last administered 

on 11/15/17at 07:22;  Start 11/12/17 at 12:00;  Stop 11/15/17 at 10:28;  Status 

DC


Methylprednisolone Sodium Succinate (SoluMEDROL INJ) 60 mg Q12H IV PUSH ;  

Start 11/12/17 at 12:00;  Stop 11/12/17 at 12:00;  Status DC


Methylprednisolone Sodium Succinate (SoluMEDROL INJ) 60 mg Q8H IV PUSH  Last 

administered on 11/15/17at 11:01;  Start 11/12/17 at 12:00;  Stop 11/15/17 at 12

:11;  Status DC


Enoxaparin Sodium (Lovenox Inj) 40 mg Q24H SQ  Last administered on 11/12/17at 

12:54;  Start 11/12/17 at 12:00;  Status Future Hold


Ferrous Sulfate (Ferrous Sulfate Liq) 300 mg BID PO  Last administered on 11/13/ 17at 09:05;  Start 11/12/17 at 14:30;  Stop 11/13/17 at 17:41;  Status DC


Acetylcysteine (Mucomyst 20% Neb) 2 ml Q6HR  NEB NEB ;  Start 11/12/17 at 16:00

;  Stop 11/12/17 at 17:41;  Status DC


Non-Formulary Medication 2 ML NEB EVERY 6 HOURS Q6HR  NEB NEB  Last 

administered on 11/14/17at 04:25;  Start 11/12/17 at 18:00;  Stop 11/15/17 at 10

:41;  Status DC


Potassium Bicarb/ Potassium Chloride (K-Lyte Cl  Eff) 25 meq ONCE  ONCE PO  

Last administered on 11/13/17at 07:26;  Start 11/13/17 at 06:45;  Stop 11/13/17 

at 06:51;  Status DC


Lactulose (Lactulose Liq) 30 ml DAILY NG  Last administered on 11/17/17at 09:16

;  Start 11/13/17 at 09:00


Docusate Sodium (Colace) 100 mg BID PO  Last administered on 11/17/17at 21:36;  

Start 11/13/17 at 09:00


Magnesium Sulfate/ Dextrose 100 ml @  100 mls/hr ONCE  ONCE IV  Last 

administered on 11/13/17at 08:57;  Start 11/13/17 at 08:45;  Stop 11/13/17 at 09

:44;  Status DC


Sodium Chloride 250 ml @ 0 mls/hr BOLUS  ONCE IV  Last administered on 11/13/ 17at 10:30;  Start 11/13/17 at 10:30;  Stop 11/13/17 at 10:31;  Status DC


Sodium Chloride 500 ml @  500 mls/hr BOLUS  ONCE IV  Last administered on 11/13/ 17at 13:11;  Start 11/13/17 at 13:15;  Stop 11/13/17 at 14:14;  Status DC


Sodium Chloride 500 ml @  500 mls/hr Q1H ONCE IV  Last administered on 11/13/ 17at 17:36;  Start 11/13/17 at 17:30;  Stop 11/13/17 at 18:29;  Status DC


Ferrous Sulfate (Ferrous Sulfate) 325 mg BID PO  Last administered on 11/18/ 17at 09:50;  Start 11/13/17 at 21:00


Oxycodone/ Acetaminophen (Percocet 7.5-325 Mg) 1 tab Q6H  PRN PO pain 5-10 Last 

administered on 11/18/17at 11:41;  Start 11/13/17 at 18:15


Albumin Human 500 ml @  250 mls/hr ONCE  ONCE IV  Last administered on 11/14/ 17at 04:26;  Start 11/14/17 at 03:45;  Stop 11/14/17 at 05:44;  Status DC


Furosemide (Lasix Inj) 40 mg STK-MED ONCE .ROUTE  Last administered on 11/14/ 17at 09:04;  Start 11/14/17 at 09:04;  Stop 11/14/17 at 14:59;  Status DC


Potassium Bicarb/ Potassium Chloride (K-Lyte Cl  Eff) 25 meq ONCE  ONCE PO  

Last administered on 11/15/17at 09:00;  Start 11/15/17 at 09:00;  Stop 11/15/17 

at 09:01;  Status DC


Potassium Chloride (KCl) 30 meq ONCE  ONCE PO ;  Start 11/15/17 at 11:00;  Stop 

11/15/17 at 11:00;  Status DC


Potassium Phos/ Sodium Phos (K-Phos Neutral) 250 mg Q8HR PO  Last administered 

on 11/18/17at 13:16;  Start 11/15/17 at 08:30


Bisacodyl (Dulcolax Supp) 10 mg ONCE  ONCE RECTAL  Last administered on 11/15/

17at 11:01;  Start 11/15/17 at 10:00;  Stop 11/15/17 at 10:20;  Status DC


Sodium Biphosphate/ Sodium Phosphate (Fleets Enema (Adult)) 133 ml ONCE  ONCE 

ME ;  Start 11/15/17 at 10:00;  Stop 11/15/17 at 10:20;  Status DC


Potassium Bicarb/ Potassium Chloride (K-Lyte Cl  Eff) 25 meq ONCE  ONCE PO  

Last administered on 11/15/17at 11:01;  Start 11/15/17 at 10:00;  Stop 11/15/17 

at 10:20;  Status DC


Lidocaine HCl (Xylocaine-Mpf 1% Inj) 50 ml STK-MED ONCE OTHER ;  Start 11/8/17 

at 12:00;  Stop 11/15/17 at 10:58;  Status DC


Rocuronium Bromide (Zemuron Inj) 50 mg STK-MED ONCE IV PUSH ;  Start 11/8/17 at 

12:00;  Stop 11/15/17 at 10:58;  Status DC


Neostigmine Methylsulfate (Prostigmin Inj) 3 mg STK-MED ONCE IV ;  Start 11/8/ 17 at 12:00;  Stop 11/15/17 at 10:58;  Status DC


Glycopyrrolate (Robinul Inj) 1 mg STK-MED ONCE IV PUSH ;  Start 11/8/17 at 12:00

;  Stop 11/15/17 at 10:58;  Status DC


Phenylephrine HCl (Neosynephrine/ NS 1000 Mcg/10ml Syr) 1,000 mcg STK-MED ONCE 

IV ;  Start 11/8/17 at 12:00;  Stop 11/15/17 at 10:58;  Status DC


Ondansetron HCl (Zofran Inj) 4 mg STK-MED ONCE IV PUSH ;  Start 11/8/17 at 12:00

;  Stop 11/15/17 at 10:58;  Status DC


Fentanyl Citrate (fentaNYL INJ) 200 mcg STK-MED ONCE IV ;  Start 11/8/17 at 12:

00;  Stop 11/15/17 at 10:58;  Status DC


Morphine Sulfate (Morphine Inj) 4 mg STK-MED ONCE IV ;  Start 11/8/17 at 12:00;

  Stop 11/15/17 at 10:58;  Status DC


Propofol (Diprivan  200 Mg/20 ml Inj) 200 mg STK-MED ONCE IV ;  Start 11/8/17 

at 12:00;  Stop 11/15/17 at 10:58;  Status DC


Parenteral Electrolytes 1,000 ml @  As Directed STK-MED ONCE IV ;  Start 11/8/ 17 at 12:00;  Stop 11/15/17 at 10:58;  Status DC


Lactated Ringer's 1,000 ml @  As Directed STK-MED ONCE IV ;  Start 11/8/17 at 12

:00;  Stop 11/15/17 at 10:58;  Status DC


Methylprednisolone Sodium Succinate (SoluMEDROL INJ) 40 mg BID IV PUSH  Last 

administered on 11/16/17at 09:29;  Start 11/15/17 at 21:00;  Stop 11/16/17 at 18

:02;  Status DC


Bethanechol Chloride (Urecholine) 25 mg Q8H PO  Last administered on 11/18/17at 

11:41;  Start 11/15/17 at 20:00


Furosemide (Lasix Inj) 40 mg DAILY IV PUSH  Last administered on 11/18/17at 09:

49;  Start 11/16/17 at 09:00


Potassium Chloride (KCl) 30 meq DAILY PO  Last administered on 11/18/17at 09:48

;  Start 11/16/17 at 09:00


Tamsulosin HCl (Flomax) 0.4 mg DAILY PO  Last administered on 11/18/17at 09:49;

  Start 11/16/17 at 11:00


Prednisone (Deltasone) 10 mg BID PO  Last administered on 11/18/17at 09:50;  

Start 11/16/17 at 21:00


Magnesium Sulfate/ Dextrose 100 ml @  100 mls/hr Q1H IV  Last administered on 11 /16/17at 20:26;  Start 11/16/17 at 19:00;  Stop 11/16/17 at 20:59;  Status DC


Tiotropium Bromide (Spiriva Inh) 18 mcg DAILY INH  Last administered on 11/18/ 17at 09:48;  Start 11/18/17 at 09:00


Urinary Catheter:  No





A/P


Problem List:  


(1) Pleural effusion on left


ICD Code:  J90 - Pleural effusion, not elsewhere classified


Status:  Acute


(2) Respiratory distress


ICD Code:  R06.00 - Dyspnea, unspecified


Status:  Resolved


(3) Hyponatremia


ICD Code:  E87.1 - Hypo-osmolality and hyponatremia


Status:  Acute


(4) COPD (chronic obstructive pulmonary disease)


ICD Code:  J44.9 - Chronic obstructive pulmonary disease, unspecified


Status:  Chronic


(5) Tobacco abuse


ICD Code:  Z72.0 - Tobacco use


Status:  Chronic


Assessment and Plan


ASSESSMENT 


Acute hypoxemic respiratory failure


Complete left lung atelectasis


Left-sided consolidation with pleural effusion


Left thoracotomy with decortication, evacuation of complex loculated effusion, 

adhesiolysis


Pulmonary hypertension


COPD exacerbation


Hyponatremia/hypophosphatemia


Hypokalemia.


Urinary tract infection on abx








PLAN:


Neuro:


Hold propofol and reduce fentanyl to facilitate weaning trials


Pain control with fentanyl--off all sedation at this time


ORAL PAIN CONTROL





Pulm: 


Acute hypoxemic respiratory failure


Complete L lung atelectasis


S/p decortication for L hemothorax


COPD


Tobacco abuse


?L lung mass vs loculated effusion/


Emergently intubated and placed on mechanical ventilation 11/12


s/p Bronchoscopy by Dr. Grey.  Left lung fields reexpanded, persistent 

volume loss involving left lower lung field


Start weaning trials with possible extubation--extubated November 13


Status post left thoracotomy and decortication 11/8/17-postop diagnosis was 

hemothorax


Bronchodilators, Solumedrol 60 mg IV q8 hours 


s/p CT guided left-sided thoracentesis and CT placement by IR 10/26/17


CT chest: Either highly complex fluid or soft tissue mass involving the left 

lung base with mass effect. This measures 12.2 x 12.6 x 7.4 cm. 


4.4 cm ascending aortic aneurysm.


--Status post bronchoscopy with left sided bronchial alveolar lavage by 

pulmonary on November 13


CHEST TUBE REMOVED


LOTS OF DRAINAGE FROM CHEST TUBE SITE





CV:  


Pulmonary hypertension, most likely secondary from chronic hypoxia by echo


Moderate aortic stenosis mild to moderate aortic regurgitation


Monitor HR and BP and maintain MAP>65mmHg


Echo 10/27 - systolic function grossly normal. Moderate AS/AI.  Moderate to 

severe pulmonary hypertension.. 





GI: 


Nothing by mouth-has been extubated


Diet as tolerated





: 


Hyponatremia ?chronic but chronicity not truly known


Hypokalemia, resolved- STILL LOW WILL REPLACE





ID: 


UTI


Possible pneumonia


Continue Rocephin for now


UTI present on admission with culture positive for pansensitive Klebsiella.


Influenza screen, blood and pleural fluid cultures negative to date. 


Temporal fluid studies from November 8, on cultures negative to date


URINARY RETENTION- STARTED ON BETHANECHOL YESTERDAY - WILL ADD FLOMAX


CONTINUE NAVARRETE





Endo: 


SSI with Accu-Cheks for glycemic control.





Heme: 


Macrocytic anemia 


Anemia of chronic disease and iron deficiency


Monitor CBC.  Normal B12, 


Supplement iron due to her lab findings consistent with iron deficiency


Being transfused 1 unit packed red blood cells today





DVT prophylaxis with  SCDs. Lovenox 40 mg sq daily. IV Famotidine 20 mg IV q12h


NOT BEING TRANSFUSED TODAY





Needs physical therapy and occupational therapy aggressively











PAIN CONTROL INCREASE PERCOCET TO  10/325


NEEDS TO GET MOBILE





AM LABS


Discharge Planning


Needs to be more mobile


May require TANNER IF ABLE TO GET APPROVAL





Problem Qualifiers





(1) COPD (chronic obstructive pulmonary disease):  


Qualified Codes:  J43.1 - Panlobular emphysema








Roland Foote DO Nov 18, 2017 14:22

## 2017-11-19 NOTE — HHI.PR
Subjective


Remarks


Patient seen in room in follow-up for her COPD, pleural effusion and weakness.  

Doing well.  Patient had just gotten up out of the bed with nursing team and 

was tachycardic and tachypneic.  Oxygen patient was fine.


Care plan discussed with cardiac RN





Objective


Vitals





Vital Signs








  Date Time  Temp Pulse Resp B/P (MAP) Pulse Ox O2 Delivery O2 Flow Rate FiO2


 


11/19/17 08:24     98 Nasal Cannula 2.00 


 


11/19/17 06:00  84      


 


11/19/17 05:41 98.1 83 20 144/94 (111) 94   


 


11/19/17 05:35     94 Room Air  


 


11/19/17 05:00  83      


 


11/19/17 04:00  100      


 


11/19/17 03:00  120      


 


11/19/17 02:00  98      


 


11/19/17 01:00  90      


 


11/19/17 00:00  104      


 


11/18/17 23:25 97.5 100 20 131/77 (95) 93   


 


11/18/17 23:22     93 Room Air  


 


11/18/17 23:00  92      


 


11/18/17 22:00  97      


 


11/18/17 21:40     95 Room Air  


 


11/18/17 21:39 97.8 102 20 130/70 (90) 100   


 


11/18/17 21:00  98      


 


11/18/17 20:55     96   


 


11/18/17 20:00  90      


 


11/18/17 19:00  91      


 


11/18/17 18:00  82      


 


11/18/17 17:00  97      


 


11/18/17 16:00  93      


 


11/18/17 15:46     100 Nasal Cannula 2.00 


 


11/18/17 15:46 97.6 96 24 129/73 (91) 100   


 


11/18/17 15:00  90      


 


11/18/17 14:23     99 Nasal Cannula 3.00 


 


11/18/17 14:00  102      


 


11/18/17 13:25      Nasal Cannula 2.00 


 


11/18/17 13:00  100      


 


11/18/17 12:00  93      


 


11/18/17 11:48 97.8 121 33 128/87 (101) 100   


 


11/18/17 11:48     100 Nasal Cannula 2.00 


 


11/18/17 11:00  129      


 


11/18/17 10:00  96      


 


11/18/17 09:00  94      














I/O      


 


 11/18/17 11/18/17 11/18/17 11/19/17 11/19/17 11/19/17





 07:00 15:00 23:00 07:00 15:00 23:00


 


Intake Total 720 ml  840 ml 490 ml  


 


Output Total 800 ml  1400 ml 860 ml  


 


Balance -80 ml  -560 ml -370 ml  


 


      


 


Intake Oral 720 ml  840 ml 490 ml  


 


Output Urine Total 800 ml  1400 ml 860 ml  


 


# Bowel Movements   1 2  








Result Diagram:  


11/19/17 0552                                                                  

              11/19/17 0522





Imaging





Last Impressions








Chest X-Ray 11/16/17 0600 Signed





Impressions: 





 Service Date/Time:  Thursday, November 16, 2017 03:46 - CONCLUSION:  Stable 





 examination. Residual pleural effusion on the left and residual pleural air 





 similar to the previous study.       Ernesto Wyman MD 


 


Abdomen X-Ray 11/12/17 1820 Signed





Impressions: 





 Service Date/Time:  Sunday, November 12, 2017 18:30 - CONCLUSION:  1. No 

obvious 





 obstruction or pneumoperitoneum on this limited view of the abdomen. 2. 





 Nasogastric tube with the tip projecting over the proximal gastric body. 3. 





 Volume loss in the left hemithorax with stable position of 2 thoracostomy 

tubes 





 and left basilar consolidation. .     Chris Kumari MD 


 


Chest CT 10/28/17 0000 Signed





Impressions: 





 Service Date/Time:  Saturday, October 28, 2017 14:44 - CONCLUSION:  1. 

Interval 





 placement of left-sided chest tube with slight decrease in the complex 





 low-density fluid collection in the lower left hemithorax. 2. Volume loss 





 remains in the left hemithorax with consolidation in the left infrahilar 

region 





 3. New small right pleural effusion and consolidation in the right posterior 





 lower lobe 4. The previously noted fluid and debris in the left mainstem 





 bronchus is no longer present.     Ibrahima Mccarthy MD 


 


Chest Ultrasound 10/27/17 0000 Signed





Impressions: 





 Service Date/Time:  Friday, October 27, 2017 09:59 - CONCLUSION:  No focal 





 collections of anechoic free fluid.  Prominent amount of heterogeneous 





 echotexture material surrounding the left lower chest.     Efrain Samuels MD 


 


Chest Tube Insertion 10/26/17 1224 Signed





Impressions: 





 Service Date/Time:  Thursday, October 26, 2017 13:22 - CONCLUSION: 

Uncomplicated 





 CT-guided thoracentesis.     oJrge Carrillo MD 








Objective Remarks


Diffuse petechia, left chest wall drain


GENERAL: This is a frail female who appears older than stated age


CARDIOVASCULAR: Sinus tachycardia without murmurs, gallops, or rubs. 


RESPIRATORY: Clear to auscultation. Breath sounds equal bilaterally. No wheezes

, rales, or rhonchi.  


GASTROINTESTINAL: Abdomen soft, non-tender, nondistended. Normal active bowel 

sounds


MUSCULOSKELETAL: Extremities without clubbing, cyanosis, or edema.


NEURO:  Alert & Oriented x4 to person, place, time, situation.  Moves all ext x4


Procedures


CT guided thoracentesis with L chest tube placement 10/26


Left thoracoscopic exploration, left thoracotomy for decortication, evacuation 

of complex loculated effusion, adhesiolysis 11/8/17 11-12 INTUBATED


11-13 EXTUBATED


11-13 BRONCHOSCOPY WITH BAL OF LEFT SIDE





A/P


Problem List:  


(1) COPD (chronic obstructive pulmonary disease)


ICD Code:  J44.9 - Chronic obstructive pulmonary disease, unspecified


Status:  Chronic


Plan:  continue to wean oral steroids


spiriva


Follow-up with bronchodilators, pulmonary toilet


Pulmonary consultation appreciated


Not on home O2 and oxygenation is doing well on room air





(2) Pancytopenia


ICD Code:  D61.818 - Other pancytopenia


Plan:  Concern for mild dysplastic syndrome


Patient has refused bone marrow biopsy


Bone marrow slowly recovering


Status post 3 units packed red blood cells in transfusion





(3) UTI (urinary tract infection)


ICD Code:  N39.0 - Urinary tract infection, site not specified


Status:  Acute


Plan:  Klebsiella UTI, treated





(4) Pleural effusion on left


ICD Code:  J90 - Pleural effusion, not elsewhere classified


Status:  Acute


Plan:  loculated


s/p decortication/thoracotomy 


wound care to left chest


cont pain management


encourage pulmonary toilet





(5) Urinary retention


ICD Code:  R33.9 - Retention of urine, unspecified


Plan:  maintain [place]


poorly ambulatory





(6) Physical deconditioning


ICD Code:  R53.81 - Other malaise


Status:  Chronic


Plan:  Patient agreeable to physical therapy for poorly motivated and very 

symptomatic with movement.  Her heart rate and respiratory rate did go up 

although she remains with normal oxygenation





(7) Aortic stenosis


ICD Code:  I35.0 - Nonrheumatic aortic (valve) stenosis


Plan:  Patient refuses any further workup


EF is preserved on echo this admission


Continue medical management of diuresis, low-salt diet, aggressive blood 

pressure management





(8) Pulmonary HTN


ICD Code:  I27.20 - Pulmonary hypertension, unspecified


Plan:  Pulmonary artery pressure 63 mmHg on echo this admission


Continue with medical management, supportive care and oxygen as needed





Assessment and Plan


DVT prophylaxis with teds and SCDs due to thrombocytopenia


Discharge Planning


Patient on telemetry floor to continue aggressive physical therapy


May be candidate for [place] [facility] at inpatient rehabilitation if continues to 

improve


Otherwise will need to be home with family





Problem Qualifiers





(1) COPD (chronic obstructive pulmonary disease):  


Qualified Codes:  J43.1 - Panlobular emphysema








Kinsey Alvarado MD Nov 19, 2017 08:57

## 2017-11-20 NOTE — HHI.PR
Subjective


Remarks


Patient seen and evaluated in follow-up for recovery status post thoracotomy.  

Respiratory status is improved.  Incentive spirometry education provided the 

bedside


Plan of care and update of her progress provided to spouse at bedside





Objective


Vitals





Vital Signs








  Date Time  Temp Pulse Resp B/P (MAP) Pulse Ox O2 Delivery O2 Flow Rate FiO2


 


11/20/17 10:00  97      


 


11/20/17 09:30   25     


 


11/20/17 09:00  95      


 


11/20/17 08:00  92      


 


11/20/17 07:00  99      


 


11/20/17 07:00 97.4 106 20 158/85 (109) 97   


 


11/20/17 06:00  100      


 


11/20/17 05:04  100      


 


11/20/17 04:00  93      


 


11/20/17 03:04  97      


 


11/20/17 03:00 97.5 95 20 153/77 (102) 94   


 


11/20/17 02:03  91      


 


11/20/17 01:00  93      


 


11/20/17 00:00  94      


 


11/19/17 23:00 98.0 91 16 137/78 (97) 97   


 


11/19/17 23:00  98      


 


11/19/17 22:04        21


 


11/19/17 22:00  91      


 


11/19/17 21:00  93      


 


11/19/17 20:00  101      


 


11/19/17 19:00 98.0 105 20 137/73 (94) 95   


 


11/19/17 19:00  92      


 


11/19/17 19:00      Room Air  


 


11/19/17 18:00  91      


 


11/19/17 17:00  93      


 


11/19/17 16:00  91      


 


11/19/17 15:42     93 Room Air  


 


11/19/17 15:42 97.8 117 20 116/71 (86) 93   


 


11/19/17 15:00  113      


 


11/19/17 14:00  99      


 


11/19/17 13:00  91      


 


11/19/17 12:00  92      


 


11/19/17 11:15     100 Room Air  


 


11/19/17 11:15 97.5 103 20 160/89 (112) 100   














I/O      


 


 11/19/17 11/19/17 11/19/17 11/20/17 11/20/17 11/20/17





 07:00 15:00 23:00 07:00 15:00 23:00


 


Intake Total 490 ml  960 ml 480 ml  


 


Output Total 860 ml  1100 ml 550 ml  


 


Balance -370 ml  -140 ml -70 ml  


 


      


 


Intake Oral 490 ml  960 ml 480 ml  


 


Output Urine Total 860 ml  1100 ml 550 ml  


 


# Bowel Movements 2   1  








Result Diagram:  


11/19/17 0552                                                                  

              11/19/17 0522





Objective Remarks


Diffuse petechia, left chest wall drain


GENERAL: This is a frail female who appears older than stated age, comfortable 

in the chair


CARDIOVASCULAR: Sinus rhythm without murmurs, gallops, or rubs. 


RESPIRATORY: Clear to auscultation. Breath sounds equal bilaterally. No wheezes

, rales, or rhonchi.  


GASTROINTESTINAL: Abdomen soft, non-tender, nondistended. Normal active bowel 

sounds


MUSCULOSKELETAL: Extremities without clubbing, cyanosis, or edema.


NEURO:  Alert & Oriented x4 to person, place, time, situation.  Moves all ext x4


Procedures


CT guided thoracentesis with L chest tube placement 10/26


Left thoracoscopic exploration, left thoracotomy for decortication, evacuation 

of complex loculated effusion, adhesiolysis 11/8/17 11-12 INTUBATED


11-13 EXTUBATED


11-13 BRONCHOSCOPY WITH BAL OF LEFT SIDE





A/P


Problem List:  


(1) COPD (chronic obstructive pulmonary disease)


ICD Code:  J44.9 - Chronic obstructive pulmonary disease, unspecified


Status:  Chronic


Plan:  continue to wean oral steroids


spiriva


Follow-up with bronchodilators, pulmonary toilet


Pulmonary consultation appreciated


Not on home O2 and oxygenating well on room air





(2) Pancytopenia


ICD Code:  D61.818 - Other pancytopenia


Plan:  Concern for mild dysplastic syndrome


Patient has refused bone marrow biopsy


Bone marrow slowly recovering


Status post 3 units packed red blood cells in transfusion





(3) UTI (urinary tract infection)


ICD Code:  N39.0 - Urinary tract infection, site not specified


Status:  Acute


Plan:  Klebsiella UTI, treated





(4) Pleural effusion on left


ICD Code:  J90 - Pleural effusion, not elsewhere classified


Status:  Acute


Plan:  loculated


s/p decortication/thoracotomy 


wound care to left chest


cont pain management


encourage pulmonary toilet





(5) Urinary retention


ICD Code:  R33.9 - Retention of urine, unspecified


(6) Physical deconditioning


ICD Code:  R53.81 - Other malaise


Status:  Chronic


Plan:  Patient agreeable to aggressive physical/occupational therapy 


  Her heart rate and respiratory rate did go up although she remains with 

normal oxygenation





(7) Aortic stenosis


ICD Code:  I35.0 - Nonrheumatic aortic (valve) stenosis


Plan:  Patient refuses any further workup


EF is preserved on echo this admission


Continue medical management of diuresis, low-salt diet, aggressive blood 

pressure management





(8) Pulmonary HTN


ICD Code:  I27.20 - Pulmonary hypertension, unspecified


Plan:  Pulmonary artery pressure 63 mmHg on echo this admission


Continue with medical management, supportive care and oxygen as needed





Assessment and Plan


DVT prophylaxis with teds and SCDs due to thrombocytopenia


Discharge Planning


Patient on telemetry floor to continue aggressive physical therapy


May be candidate for [place] bed at inpatient rehabilitation if continues to 

improve


Otherwise will need to be home with family





Problem Qualifiers





(1) COPD (chronic obstructive pulmonary disease):  


Qualified Codes:  J43.1 - Panlobular emphysema








Kinsey Alvarado MD Nov 20, 2017 11:14

## 2017-11-20 NOTE — HHI.PR
Subjective


Remarks


S/P thoracotomy  and  decortication .Feels tired.  Labs were  Stable.


No fever.





Objective





Vital Signs








  Date Time  Temp Pulse Resp B/P (MAP) Pulse Ox O2 Delivery O2 Flow Rate FiO2


 


11/20/17 18:00  101      


 


11/20/17 17:37   18     


 


11/20/17 17:00  100      


 


11/20/17 16:00  99      


 


11/20/17 15:00  91      


 


11/20/17 15:00 98.3 99 16 130/74 (92) 96   


 


11/20/17 14:00  93      


 


11/20/17 13:00  118      


 


11/20/17 12:00  107      


 


11/20/17 11:00 98.5 105 23 152/96 (114) 98   


 


11/20/17 11:00  86      


 


11/20/17 10:00  97      


 


11/20/17 09:00  95      


 


11/20/17 08:00  92      


 


11/20/17 07:00  99      


 


11/20/17 07:00 97.4 106 20 158/85 (109) 97   


 


11/20/17 06:00  100      


 


11/20/17 05:04  100      


 


11/20/17 04:00  93      


 


11/20/17 03:04  97      


 


11/20/17 03:00 97.5 95 20 153/77 (102) 94   


 


11/20/17 02:03  91      


 


11/20/17 01:00  93      


 


11/20/17 00:00  94      


 


11/19/17 23:00 98.0 91 16 137/78 (97) 97   


 


11/19/17 23:00  98      


 


11/19/17 22:04        21


 


11/19/17 22:00  91      


 


11/19/17 21:00  93      


 


11/19/17 20:00  101      


 


11/19/17 19:00 98.0 105 20 137/73 (94) 95   


 


11/19/17 19:00  92      


 


11/19/17 19:00      Room Air  














I/O      


 


 11/19/17 11/19/17 11/19/17 11/20/17 11/20/17 11/20/17





 07:00 15:00 23:00 07:00 15:00 23:00


 


Intake Total 490 ml  960 ml 480 ml  840 ml


 


Output Total 860 ml  1100 ml 550 ml  550 ml


 


Balance -370 ml  -140 ml -70 ml  290 ml


 


      


 


Intake Oral 490 ml  960 ml 480 ml  840 ml


 


Output Urine Total 860 ml  1100 ml 550 ml  550 ml


 


# Bowel Movements 2   1  1








Result Diagram:  


11/19/17 0552                                                                  

              11/19/17 0522





Objective Remarks


GENERAL:  This is an averagely built middle-aged white female who is alert


HEENT:  Head normocephalic.  Pupils are reactive and equal.  Tongue moist.


Throat is clear.  


NECK:  Supple.  No bruits, thyroid enlargement or lymphadenopathy.


CHEST:  Distant breath sounds at the left  lung base and occ wheeze .


HEART:  The heart sounds are irregular, S1-S2.  No murmur.  No S3.


ABDOMEN:  Soft and nontender.  No organomegaly.  Bowel sounds are active.


EXTREMITIES:  Mild  Edema with diminished peripheral pulses.   NEUROLOGIC:


Reflexes are 1+ . No deficits


SKIN: Dry and scaly.





Assessment and Plan


Assessment and Plan





 


IMPRESSION


1.  COPD with acute exacerbation, resolved


2.  S/p decortication  left  Chest


3.  Atelectasis left lower lobe with mucus plugging.


4.  Hyponatremia.


5.  Abnormal liver enzymes


6.  Anemia of chronic disease.











Plan :








1. D/C O2





2. IS at bedside q3h





3. PT evaluation.





4. D/C Duoneb  .





5. Prednisone 15  mg daily.





6. Add Spiriva , 1 cap daily





7. Home per NATA Banda MD Nov 20, 2017 18:27

## 2017-11-20 NOTE — PD.CAR.PN
CVT Progress Note


Subjective/Hospital Course:


56-year-old white female with a history of COPD, chronic bronchitis who 

apparently was suffering from food poisoning.  The patient states that she was 

unable to pass urine and became very weak, had lost weight with diarrhea, 

abdominal discomfort, dehydration and presented to ER for evaluation. She c/o 

20 lb weight loss over the past 4 months. Chest CT done showed evidence of a 

loculated  mass-like infiltrate in the left lung base 12 x 12 cm and a left 

upper lobe lung nodule 7 mm.  The patient was started on Solu-Medrol, IV 

antibiotics including Rocephin and Zithromax.  Denies chest pains or abdominal 

pains or nausea or vomiting. She underwent chest tube placement in IR 10/26


with minimal drainage of this effusion.. Chest tube was removed. Repeat chest x-

ray shows no change in the mod pleural effusion but does shows some mild patchy 

opacities in the right lung base. 


cardiology Dr Sebastián de la torre pt   TTE shows preserved EF and at least moderate AS/AI.





present diagnosis : Respiratory Insufficiency, Left-sided consolidation , 

Exudative pleural effusion, COPD exacerbation, Urinary tract infection, x 

tobacco use


Anemia, thrombocytopenia, Moderate aortic valve regurgitation, Moderate 

calcific aortic valve stenosis





pleural fluid : exudative effusion. Cytology neg for malignant cells. Fluid cx 

neg 


CT chest 10/26 : Either highly complex fluid or soft tissue mass involving the 

left lung base with mass effect. This measures 12.2 x 12.6 x 7.4 cm.  4.4 cm 

ascending aortic aneurysm.





pt is now agreeable for surgery : plan is for left thoracoscopic exploration 

for loculated pleural effusion , possible  thoracotomy , decortication in am 





11/8


on nasal cannula 


surgery: Left thoracoscopic exploration, left thoracotomy for decortication, 

evacuation of complex loculated effusion, adhesiolysis





11/9


pain controlled with PCA morphine 


needs aggressive pulm toileting 


OOB, ambulate 


recheck UA / pleural fluid cultures and path pending 





11/10


 chest tubes drained 130cc/ 12 hrs serous drainage / leave chest tube in for 

now 


CXR noted , from 11/9, mucus plugging, aggressive pulm toileting 


add ezpap and acapella 


now on Rocephin, will dc ancef, await on UA culture 





OOB, ambulate, 





11/11/17


No complaints today.  Denies dyspnea, but CXR shows complete opacification of 

left lung





11/12/17


Hypoxic this morning despite 100% FiO2.  Transferred to CVICU and reintubated.  

Bronchoscopy pending.





11/13


remains in CVICU, hematology consulted 


s/p Bronch 


chest tube to water seal 





11/14


now on nasal cannula 2 liters 


continue pulm toileting 


appreciate Hematology 





will transfer to stepdown  


continue PT/OT





11/15


chest tubes with minimal drainage


both chest tubes removed without difficulty 


on 2 liter nasal cannula 


need aggressive PT/OT


replace K+/ diuresis later if stable 


she will need SNF at discharge / very deconditioned 


will give ducolax supp / fleets today 





11/16


need to be OOB, ambulate 


CXR noted , PTX stable 








11/17


pt needs much encouragement to get OOB, ambulate, very weak 


recommended she agree to Bone marrow bx 


dressing in place left chest wall, some serous drainage 


on Room air 


very painful , depressed , may need psych eval 


will leave to PCP, ok to transfer out of [facility] 


will follow prn, she has outpt appointment  for f/u in our office 








11/20


left postero lateral chest incision with mild erythema 


nursing orders to paint bid with Betadine x 5 days 


OOB, ambulate, dc corbett cath if possible 


will sign off


Objective:


GENERAL: 


SKIN: Warm and dry. left postero lateral chest incision intact, mild erythema 


HEAD: Normocephalic.


EYES: No scleral icterus. No injection or drainage. 


NECK: Supple, trachea midline. No JVD or lymphadenopathy.


CARDIOVASCULAR: Regular rate and rhythm without murmurs, gallops, or rubs. 


RESPIRATORY: diminished in the bases 


Breath sounds equal bilaterally. No accessory muscle use.


GASTROINTESTINAL: Abdomen soft, non-tender, nondistended. 


MUSCULOSKELETAL: No cyanosis, or edema. 


BACK: Nontender without obvious deformity. No CVA tenderness.








Vital Signs








  Date Time  Temp Pulse Resp B/P (MAP) Pulse Ox O2 Delivery O2 Flow Rate FiO2


 


11/20/17 09:30   25     


 


11/20/17 09:00  95      


 


11/20/17 08:00  92      


 


11/20/17 07:00  99      


 


11/20/17 07:00 97.4 106 20 158/85 (109) 97   


 


11/20/17 06:00  100      


 


11/20/17 05:04  100      


 


11/20/17 04:00  93      


 


11/20/17 03:04  97      


 


11/20/17 03:00 97.5 95 20 153/77 (102) 94   


 


11/20/17 02:03  91      


 


11/20/17 01:00  93      


 


11/20/17 00:00  94      


 


11/19/17 23:00 98.0 91 16 137/78 (97) 97   


 


11/19/17 23:00  98      


 


11/19/17 22:04        21


 


11/19/17 22:00  91      


 


11/19/17 21:00  93      


 


11/19/17 20:00  101      


 


11/19/17 19:00 98.0 105 20 137/73 (94) 95   


 


11/19/17 19:00  92      


 


11/19/17 19:00      Room Air  


 


11/19/17 18:00  91      


 


11/19/17 17:00  93      


 


11/19/17 16:00  91      


 


11/19/17 15:42     93 Room Air  


 


11/19/17 15:42 97.8 117 20 116/71 (86) 93   


 


11/19/17 15:00  113      


 


11/19/17 14:00  99      


 


11/19/17 13:00  91      


 


11/19/17 12:00  92      


 


11/19/17 11:15     100 Room Air  


 


11/19/17 11:15 97.5 103 20 160/89 (112) 100   


 


11/19/17 11:00  108      


 


11/19/17 10:00  112      








Result Diagram:  


11/19/17 0552                                                                  

              11/19/17 0522








(1) COPD (chronic obstructive pulmonary disease)


Plan:  on nebs, steroids  





needs aggressive pulm toileting 





(2) Tobacco abuse


Plan:  smoking cessation





(3) Physical deconditioning


Plan:  pt / ot 7 days a week 





(4) Pleural effusion on left


Plan:  s/p left thoracoscopic exploration for loculated pleural effusion 


path negative for malignant cells 





cultures neg 





OOB PT/OT 





eval for rehab


chest tubes removed 


encourage pt to  ambulate more today





s/p Bronch 11/13





ok to transfer out of [facility]PC[facility]











 














(5) Pancytopenia


Plan:  appreciate hematology input 





--Pancytopenia with macrocytosis and normal B12.  


--suspect myelodysplastic syndrome until proven otherwise.


--will need bone marrow biopsy and aspirate. (declining at this time)


--iron studies are consistent with anemia of chronic disease 








--blood transfusion if the hemoglobin is less than 8 and platelet transfusion 

if the platelet count is less than 15 or any bleeding / per Heme 








Problem Qualifiers





(1) COPD (chronic obstructive pulmonary disease):  


Qualified Codes:  J43.1 - Panlobular emphysema








Terwilliger,Jacqueline R. ARNP Nov 20, 2017 10:00

## 2017-11-21 NOTE — HHI.FF
Face to Face Verification


Diagnosis:  


(1) Atelectasis of left lung


(2) Left thoracoscopic exploration, left thoracotomy for decortication, 

evacuation of complex loculated effusion, adhesiolysis


(3) Pleural effusion on left


Physical Therapy


Order:  Evaluate and Treat





Occupational Therapy


Order:  Evaluate and Treat





Home Health Nursing








Order: Signs/symptoms of disease process





 Medication education-adverse effect





 Wound care and dressing changes





 Nursing assessment with vital signs








Instructions:


Thoracic Surgery patients


Mandatory frequency


Assess and evaluation, 2-3 x a week for one week  


Initial visit


1.   Review post chest surgery instructions chest  precautions, Activity, 

Elastic hose, Incision care, Driving, Incentive spirometry, Smoking, [place]

, Work and other)


2.   Need Betadine to paint incision


3.   Medication reconciliation


4.   Importance of follow up care/ check on appointments


5.   Make calendar  record temperature daily


6.   When to call  Home nurse, review instructions, phone list


7.   Incentive Spirometry, demonstration


Visit 1- Begin discharge instruction for patient family and/ or caregiver using 

teach back method-


1.   Signs and symptoms of infection


2.   Disease characteristics


3.   Medicines and side effects


4.   Foods and nutrition/ appetite


5.   Infection control/ hand washing/ hygiene


Visit 2- Continue teaching


1.   Discharge instructions- include additional information on smoking cessation

, 


Visit 3- Continue teaching- 


1.   Cough and deep breathing, incision monitoring.








Incentive spirometry Q1 hr x 10, while awake, also use acapella device hourly 

whole awake 





chest wall Precautions: NO pushing or pulling, ( pt must use chest  pillow to 

support chest with all activities and with coughing 





Daily incision care:  ok to shower daily, no tub bath.  Wash all incisions with 

liquid dial soap, clean wash cloth to each site, rinse and pat dry.  Observe 

for any signs of infection, such as drainage which is dark yellow, gray, green 

or foul smelling.  Immediately report to the surgeon any drainage from the 

chest incision, or legs, and for any abnormal drainage from the chest tube 

sites.  Notify surgeon if any temp >101.5 degrees F.  When specialty dressing 

removed/ or if you do not have one, continue to shower daily as above, then 

rinse and pat incision dry and paint with betadine daily x 5 days.  Allow steri 

strips to fall off if you have any.  Avoid lotions, creams, salves, oils, etc. 

for the first month





For Dr. Wallace patients , please obtain  PA & Lat CXR in 2 weeks, results to 

Dr. Wallace  ( prescription will be given) (Fax: [phone]) (Tele: [phone]) , 





F/U appointment: as per [place] instructions: PCP in 2 weeks, CV surgeon 2 weeks,  


pulmonologist  3-4 weeks





For any questions regarding incisions/ dressing / meds / post op care or above 

                            


Symptoms, 





Monday Friday 8am-5pm   Heart & Vascular Surgery Office


(  Dr. Hines & Dr. Wallace), [phone]





After Hours / Nights (5pm -8am) Weekends and Holidays 


Please call [facility] ([facility]) Charge Nurse 


[phone]











I have seen patient Chloe Dailey on 11/21/17. My clinical findings 

support the need for the requested home health care services because:








 Patient has SOB





 Deconditioned w/ increased weakness














I certify that my clinical findings support that this patient is homebound 

because:








 Post-op weakness

















Terwilliger,Jacqueline R. ARNP Nov 21, 2017 10:04


Kem Cabral MD Nov 21, 2017 10:49

## 2017-11-21 NOTE — HHI.DS
__________________________________________________





Discharge Summary


Admission Date


Oct 26, 2017 at 12:25


Discharge Date:  Nov 21, 2017


Admitting Diagnosis





respiratory distress, left pleural effusion, hyponatremia, elevated





(1) COPD (chronic obstructive pulmonary disease)


ICD Code:  J44.9 - Chronic obstructive pulmonary disease, unspecified


Status:  Chronic


(2) Pancytopenia


ICD Code:  D61.818 - Other pancytopenia


(3) UTI (urinary tract infection)


ICD Code:  N39.0 - Urinary tract infection, site not specified


Status:  Acute


(4) Pleural effusion on left


ICD Code:  J90 - Pleural effusion, not elsewhere classified


Status:  Acute


(5) Urinary retention


ICD Code:  R33.9 - Retention of urine, unspecified


(6) Physical deconditioning


ICD Code:  R53.81 - Other malaise


Status:  Chronic


(7) Aortic stenosis


ICD Code:  I35.0 - Nonrheumatic aortic (valve) stenosis


(8) Pulmonary HTN


ICD Code:  I27.20 - Pulmonary hypertension, unspecified


Procedures


CT guided thoracentesis with L chest tube placement 10/26


Left thoracoscopic exploration, left thoracotomy for decortication, evacuation 

of complex loculated effusion, adhesiolysis 11/8/17 11-12 INTUBATED


11-13 EXTUBATED


11-13 BRONCHOSCOPY WITH BAL OF LEFT SIDE


Brief History - From Admission


The patient is a 56-year-old female with past medical history of COPD, who 

presented to [facility] ED with complaints of coughing, wheezing. 

The patient states that she had food poisoning last week where she had nausea 

and diarrhea, after she ate suspicious food. She denies any worsening of 

dyspnea from baseline. In addition, she denies any constitutional symptoms. She 

quit smoking five and a half years ago and used to smoke one to two packs per 

day for about 30 years.  Chest x-ray in the ER showed consolidation with large 

effusion and volume loss on the left.  On further history she denies any 

hemoptysis, however, she reports 20 pounds weight loss in the last 4 months and 

decreased p.o. intake.  She is being followed up by Dr. Grey her outpatient 

pulmonologist.  The patient denies any use of oxygen at home, however, she is 

on bronchodilators p.r.n. Her laboratory data is significant for hyponatremia 

with sodium level 125, lactic acid level 2.1.  On arrival to the ER she was 

tachycardic and tachypneic.  When seen the patient is on 4 liters nasal cannula 

with saturation ranges between %.  In the ER she was given Rocephin, 

azithromycin, Solu-Medrol, bronchodilator treatment and IV fluids.  Her current 

blood pressure is 107/61.


CBC/BMP:  


11/21/17 0447                                                                  

              11/19/17 0522





Significant Findings





Laboratory Tests








Test


  11/19/17


05:22 11/19/17


05:52 11/21/17


04:47


 


Creatinine


  0.33 MG/DL


(0.50-1.00) 


  


 


 


Total Protein


  4.6 GM/DL


(6.4-8.2) 


  


 


 


Albumin


  2.2 GM/DL


(3.4-5.0) 


  


 


 


Calcium Level


  7.7 MG/DL


(8.5-10.1) 


  


 


 


Alkaline Phosphatase


  171 U/L


() 


  


 


 


Carbon Dioxide Level


  34.0 MEQ/L


(21.0-32.0) 


  


 


 


Red Blood Count


  


  2.70 MIL/MM3


(4.00-5.30) 2.72 MIL/MM3


(4.00-5.30)


 


Hemoglobin


  


  9.2 GM/DL


(11.6-15.3) 9.4 GM/DL


(11.6-15.3)


 


Hematocrit


  


  27.9 %


(35.0-46.0) 28.1 %


(35.0-46.0)


 


Mean Corpuscular Volume


  


  103.4 FL


(80.0-100.0) 103.1 FL


(80.0-100.0)


 


Mean Corpuscular Hemoglobin


  


  34.2 PG


(27.0-34.0) 34.4 PG


(27.0-34.0)


 


Red Cell Distribution Width


  


  21.6 %


(11.6-17.2) 22.1 %


(11.6-17.2)


 


Platelet Count


  


  77 TH/MM3


(150-450) 101 TH/MM3


(150-450)


 


Neutrophils (%) (Auto)


  


  76.9 %


(16.0-70.0) 


 


 


Monocytes (%) (Auto)


  


  10.6 %


(0.0-8.0) 


 


 


Lymphocytes # (Auto)


  


  0.5 TH/MM3


(1.0-4.8) 


 


 


Platelet Estimate  LOW (NORMAL)  








PE at Discharge


Diffuse petechia, left chest wall drain


GENERAL: This is a frail female who appears older than stated age, comfortable 

in the chair


CARDIOVASCULAR: Sinus rhythm without murmurs, gallops, or rubs. 


RESPIRATORY: Clear to auscultation. Breath sounds equal bilaterally. No wheezes

, rales, or rhonchi.  


GASTROINTESTINAL: Abdomen soft, non-tender, nondistended. Normal active bowel 

sounds


MUSCULOSKELETAL: Extremities without clubbing, cyanosis, or edema.


NEURO:  Alert & Oriented x4 to person, place, time, situation.  Moves all ext x4


Transfer Summary


The patient is a 56-year-old female with past medical history of COPD, who 

presented to [facility] ED with complaints of coughing, wheezing. 

The patient states that she had food poisoning last week where she had nausea 

and diarrhea, after she ate suspicious food. She denies any worsening of 

dyspnea from baseline. In addition, she denies any constitutional symptoms. She 

quit smoking five and a half years ago and used to smoke one to two packs per 

day for about 30 years.  Chest x-ray in the ER showed consolidation with large 

effusion and volume loss on the left.  On further history she denies any 

hemoptysis, however, she reports 20 pounds weight loss in the last 4 months and 

decreased p.o. intake.  She is being followed up by Dr. Grey her outpatient 

pulmonologist.  The patient denies any use of oxygen at home, however, she is 

on bronchodilators p.r.n. Her laboratory data is significant for hyponatremia 

with sodium level 125, lactic acid level 2.1.  On arrival to the ER she was 

tachycardic and tachypneic.  When seen the patient is on 4 liters nasal cannula 

with saturation ranges between %.  In the ER she was given Rocephin, 

azithromycin, Solu-Medrol, bronchodilator treatment and IV fluids.  Her current 

blood pressure is 107/61.





Subjective:


10/28 She complains of pain at chest tube site and requests increased pain 

meds. Repeat imaging ordered per pulmonology to evaluate mass vs loculated 

effusion. Pleural fluid studies exudative. Pleural fluid culture NGTD.   On RA.


Hospital Course


Patient was admitted and started on IV antibiotics.  On 10/27 She underwent a CT

-guided thoracentesis for suspected loculated effusion and also had chest tube 

placement.  As the patient was recovering, critical care was reconsult the for 

severe hypoxemic respiratory failure. On 11/8/17 patient had left thoracotomy 

for decortication, evacuation of complex loculated effusion, adhesiolysis by 

Dr. Rodriguez.  CXR 11/11/17 showed near complete whiteout of the left lung-

pulmonary Dr. Khalil is following.  Today a HALICAT was called as the patient 

was found profoundly hypoxemic in the 70s oxygen saturation.  She was placed on 

100% percent nonrebreather with improvement in saturation only to 81%.  Stat 

ABG on 100% nonrebreather showed pH of 7.29 PCO2 of 62 pO2 of 53 and oxygen 

saturation 79%.  Stat chest x-ray showed complete whiteout of the left lung.  

Patient was subsequently intubated and placed on mechanical ventilation.  

Patient underwent bronchoscopy the same day and underwent extubation later in 

the day after successfully tolerating CPAP weaning trials.  She had undergone 

transfusion of 1 unit of blood shortly afterwards and was tolerating nasal 

cannula well. Eventually the patient was transitioned to room air and oral 

steroids.  Her pleural fluid microbiology workup was negative as well as her 

blood cultures. -The patient was incidentally noted to have some Pancytopenia 

with macrocytosis and normal B12.  The patient had refused a bone marrow biopsy 

as recommended by hematology to evaluate for possible myelodysplastic syndrome 

given that she had undergone a lot of procedures already.  Patient was informed 

that she is to follow-up with multiple specialists including hematology oncology

, pulmonology, and CV surgery.  Patient has met maximal benefit from 

hospitalization and is clinically stable for discharge home with home PT and 

home care.


\


Pt Condition on Discharge:  Fair


Discharge Disposition:  Disch w/ Home Health Serv


Discharge Time:  > 30 minutes


Discharge Instructions


DIET: Follow Instructions for:  As Tolerated, No Restrictions, High Protein Diet


Activities you can perform:  Full Weight Bearing, Shower Only-No Bath


Activities to Avoid:  Strenuous Activity, Driving


Other Activity Instructions:  


no lifting > 8 lbs or gallon of milk


Follow up Referrals:  


PCP Follow-up with MARBELLA


Pulmonology with NATA Grey MD


Surgical with Jemima Wallace MD





New Medications:  


Albuterol 18 GM Inh (Ventolin Hfa 18 GM Inh) 90 Mcg/Act Aer


2 PUFF INH Q4-6H PRN for SHORTNESS OF BREATH, #1 INHALER 0 Refills





Tiotropium Inh (Spiriva Handihaler) 18 Mcg Cap


18 MCG INH DAILY for COPD, #30 CAP 0 Refills


1 capsule = 18 mcg


Bethanechol (Urecholine) 25 Mg Tab


25 MG PO Q8H for urinary retention, #90 TAB





Ferrous Sulfate (Ferosul) 325 Mg (65 Mg Iron) Tablet


325 MG PO BID for anemia, #60 TAB





Oxycodone HCl/Acetaminophen (Oxycodone-Acetaminophen ) 10 Mg-325 Mg Tablet


1 TAB PO Q4H PRN for PAIN SCALE 5-10, #30 TAB





Prednisone (Prednisone) 5 Mg Tab


3 TAB PO DAILY for copd, #90 TAB





Tamsulosin (Flomax) 0.4 Mg Cap


0.4 MG PO DAILY for urinary retention, #30 CAP





 


Continued Medications:  


Furosemide (Furosemide) 20 Mg Tab


20 MG PO DAILY, #30 TAB 0 Refills

















Kem Cabral MD Nov 21, 2017 15:01

## 2021-04-28 NOTE — HHI.PR
Subjective


Remarks


Pain  along  left  chest wall. Mild  SOB .


Chest tube  is  out. She has  bruising  all over.Off Antibiotics.





Objective





Vital Signs








  Date Time  Temp Pulse Resp B/P (MAP) Pulse Ox O2 Delivery O2 Flow Rate FiO2


 


11/2/17 16:00 97.6 89 18 163/98 (119) 97   


 


11/2/17 11:44 97.2 101 20 159/97 (117) 98   


 


11/2/17 08:00 97.8 98 20 145/86 (105) 97   


 


11/2/17 08:00  89      


 


11/2/17 07:43      Room Air  


 


11/2/17 04:00 97.5 86 16 124/64 (84) 96   


 


11/2/17 00:00 97.4 92 16 123/59 (80) 98   


 


11/2/17 00:00      Room Air  


 


11/1/17 20:00  84      


 


11/1/17 20:00      Room Air  


 


11/1/17 19:33 97.6 90 20 142/81 (101) 96   














I/O      


 


 11/1/17 11/1/17 11/1/17 11/2/17 11/2/17 11/2/17





 07:00 15:00 23:00 07:00 15:00 23:00


 


Intake Total  260 ml 480 ml 680 ml  


 


Output Total 300 ml  300 ml   


 


Balance -300 ml 260 ml 180 ml 680 ml  


 


      


 


Intake Oral   480 ml 680 ml  


 


IV Total  260 ml    


 


Output Urine Total 300 ml  300 ml   


 


# Voids    1  








Result Diagram:  


10/31/17 0615                                                                  

              10/31/17 0615





Objective Remarks


GENERAL:  This is an averagely built middle-aged white female who is pale and


not dyspneic.


HEENT:  Head normocephalic.  Pupils are reactive and equal.  Tongue moist.


Throat is clear.  Nasal mucosa dry.


NECK:  Supple.  No bruits, thyroid enlargement or lymphadenopathy.


CHEST:  Distant breath sounds at the lung bases with occasional crackles in the


left upper lung field.


HEART:  The heart sounds are irregular, S1-S2.  No murmur.  No S3.


ABDOMEN:  Soft and nontender.  No organomegaly.  Bowel sounds are active.


EXTREMITIES:  Edema 1+ with diminished peripheral pulses.  NEUROLOGIC:


Reflexes are 1+ with no gross motor deficits.  


RECTAL:   Exam is deferred.


SKIN: Dry and scaly.





Assessment and Plan


Assessment and Plan





 


IMPRESSION


1.  COPD with acute exacerbation


2.  Chronic left basilar effusion with loculation


3.  Atelectasis left lower lobe with mucus plugging.


4.  Hyponatremia.


5.  Abnormal liver enzymes


6.  Anemia and chronic disease.











Plan :








1. O2  2 L.prn





2. PFT at Bedside





3. D/C   Antibiotics , Rocephin.





4. Cardiology  to  see





5. D/C  Solumedrol  





6. May need  CT Guided  needle  biopsy 





7. May need  surgical evaluation  for  decortication.











NATA Grey MD Nov 2, 2017 18:16 Female